# Patient Record
Sex: FEMALE | Race: WHITE | NOT HISPANIC OR LATINO | Employment: FULL TIME | URBAN - METROPOLITAN AREA
[De-identification: names, ages, dates, MRNs, and addresses within clinical notes are randomized per-mention and may not be internally consistent; named-entity substitution may affect disease eponyms.]

---

## 2017-01-09 RX ORDER — NORGESTIMATE AND ETHINYL ESTRADIOL 7DAYSX3 28
1 KIT ORAL DAILY
Qty: 28 TABLET | Refills: 6 | Status: SHIPPED | OUTPATIENT
Start: 2017-01-09 | End: 2017-01-11 | Stop reason: SDUPTHER

## 2017-01-11 RX ORDER — NORGESTIMATE AND ETHINYL ESTRADIOL 7DAYSX3 28
1 KIT ORAL DAILY
Qty: 28 TABLET | Refills: 6 | Status: SHIPPED | OUTPATIENT
Start: 2017-01-11 | End: 2017-01-13 | Stop reason: SDUPTHER

## 2017-01-13 RX ORDER — NORGESTIMATE AND ETHINYL ESTRADIOL 7DAYSX3 28
1 KIT ORAL DAILY
Qty: 28 TABLET | Refills: 6 | Status: SHIPPED | OUTPATIENT
Start: 2017-01-13 | End: 2017-04-25 | Stop reason: SDUPTHER

## 2017-01-23 DIAGNOSIS — IMO0002 DIABETES MELLITUS TYPE 2, UNCONTROLLED: ICD-10-CM

## 2017-01-23 RX ORDER — INSULIN DETEMIR 100 [IU]/ML
INJECTION, SOLUTION SUBCUTANEOUS
Refills: 0 | Status: CANCELLED | OUTPATIENT
Start: 2017-01-23

## 2017-01-23 NOTE — TELEPHONE ENCOUNTER
----- Message from Delma Corona sent at 1/23/2017 11:04 AM EST -----  Contact: patient  insulin detemir (LEVEMIR) 100 UNIT/ML injection         Inject 50 Units under the skin every night.    3 BOXES (15 PENS ) 90 DAY SUPPLY       [place] [phone]      SCRIPT SENT

## 2017-02-13 RX ORDER — LEVOTHYROXINE SODIUM 0.15 MG/1
TABLET ORAL
Qty: 30 TABLET | Refills: 1 | Status: SHIPPED | OUTPATIENT
Start: 2017-02-13 | End: 2017-04-25 | Stop reason: SDUPTHER

## 2017-03-27 RX ORDER — GEMFIBROZIL 600 MG/1
600 TABLET, FILM COATED ORAL 2 TIMES DAILY
Qty: 60 TABLET | Refills: 0 | Status: SHIPPED | OUTPATIENT
Start: 2017-03-27 | End: 2017-03-30 | Stop reason: SDUPTHER

## 2017-03-27 RX ORDER — DAPAGLIFLOZIN AND METFORMIN HYDROCHLORIDE 5; 1000 MG/1; MG/1
TABLET, FILM COATED, EXTENDED RELEASE ORAL
Qty: 60 TABLET | Refills: 0 | Status: SHIPPED | OUTPATIENT
Start: 2017-03-27 | End: 2017-06-13 | Stop reason: SDUPTHER

## 2017-03-30 RX ORDER — GEMFIBROZIL 600 MG/1
600 TABLET, FILM COATED ORAL 2 TIMES DAILY
Qty: 60 TABLET | Refills: 0 | Status: SHIPPED | OUTPATIENT
Start: 2017-03-30 | End: 2017-04-25 | Stop reason: SDUPTHER

## 2017-04-06 ENCOUNTER — TELEPHONE (OUTPATIENT)
Dept: FAMILY MEDICINE CLINIC | Facility: CLINIC | Age: 37
End: 2017-04-06

## 2017-04-25 ENCOUNTER — OFFICE VISIT (OUTPATIENT)
Dept: FAMILY MEDICINE CLINIC | Facility: CLINIC | Age: 37
End: 2017-04-25

## 2017-04-25 VITALS
HEART RATE: 116 BPM | OXYGEN SATURATION: 98 % | DIASTOLIC BLOOD PRESSURE: 80 MMHG | HEIGHT: 63 IN | SYSTOLIC BLOOD PRESSURE: 120 MMHG | WEIGHT: 199.9 LBS | BODY MASS INDEX: 35.42 KG/M2

## 2017-04-25 DIAGNOSIS — E78.1 HYPERTRIGLYCERIDEMIA: ICD-10-CM

## 2017-04-25 DIAGNOSIS — E11.9 DIABETES MELLITUS TYPE 2, INSULIN DEPENDENT (HCC): ICD-10-CM

## 2017-04-25 DIAGNOSIS — E03.9 ACQUIRED HYPOTHYROIDISM: ICD-10-CM

## 2017-04-25 DIAGNOSIS — Z00.00 ANNUAL PHYSICAL EXAM: Primary | ICD-10-CM

## 2017-04-25 DIAGNOSIS — K52.9 GASTROENTERITIS: ICD-10-CM

## 2017-04-25 DIAGNOSIS — Z72.0 TOBACCO ABUSE: ICD-10-CM

## 2017-04-25 DIAGNOSIS — E16.1 HYPERINSULINEMIA: ICD-10-CM

## 2017-04-25 DIAGNOSIS — Z79.4 DIABETES MELLITUS TYPE 2, INSULIN DEPENDENT (HCC): ICD-10-CM

## 2017-04-25 PROCEDURE — 99213 OFFICE O/P EST LOW 20 MIN: CPT | Performed by: NURSE PRACTITIONER

## 2017-04-25 PROCEDURE — 99395 PREV VISIT EST AGE 18-39: CPT | Performed by: NURSE PRACTITIONER

## 2017-04-25 RX ORDER — LEVOTHYROXINE SODIUM 0.15 MG/1
150 TABLET ORAL DAILY
Qty: 30 TABLET | Refills: 6 | Status: SHIPPED | OUTPATIENT
Start: 2017-04-25 | End: 2017-05-11 | Stop reason: SDUPTHER

## 2017-04-25 RX ORDER — NORGESTIMATE AND ETHINYL ESTRADIOL 7DAYSX3 28
1 KIT ORAL DAILY
Qty: 28 TABLET | Refills: 6 | Status: SHIPPED | OUTPATIENT
Start: 2017-04-25 | End: 2018-03-15 | Stop reason: CLARIF

## 2017-04-25 RX ORDER — GEMFIBROZIL 600 MG/1
600 TABLET, FILM COATED ORAL 2 TIMES DAILY
Qty: 60 TABLET | Refills: 5 | Status: SHIPPED | OUTPATIENT
Start: 2017-04-25 | End: 2018-11-02

## 2017-04-25 RX ORDER — ONDANSETRON 8 MG/1
8 TABLET, ORALLY DISINTEGRATING ORAL EVERY 8 HOURS PRN
Qty: 21 TABLET | Refills: 0 | Status: SHIPPED | OUTPATIENT
Start: 2017-04-25

## 2017-04-25 NOTE — PROGRESS NOTES
"Subjective   Cora Arshad is a 37 y.o. female.   Here for her annual physical and medication refill  Is a Type 2 diabetic and is followed by endocrine and her meds are refilled by them. This is a chronic problem.  Has hyperlipidemia and takes Gemfibrozil for her triglycerides which is a chronic problem.  Hypothyroidism is a chronic problem and she takes synthroid.    Chief Complaint   Patient presents with   • Med Refill   • Diarrhea     started this am around 4 am   • Vomiting     started this am around 4 am     Social History   Substance Use Topics   • Smoking status: Current Every Day Smoker     Packs/day: 0.50     Types: Cigarettes   • Smokeless tobacco: Never Used   • Alcohol use No       History of Present Illness         Review of Systems   Gastrointestinal: Positive for diarrhea, nausea and vomiting.   All other systems reviewed and are negative.      Objective   Vitals:    04/25/17 0939   BP: 120/80   Pulse: 116   SpO2: 98%   Weight: 199 lb 14.4 oz (90.7 kg)   Height: 63\" (160 cm)     Body mass index is 35.41 kg/(m^2).    Physical Exam   Constitutional: She appears well-developed and well-nourished. No distress.   HENT:   Head: Normocephalic and atraumatic.   Right Ear: External ear normal.   Left Ear: External ear normal.   Eyes: EOM are normal.   Neck: Neck supple. No thyromegaly present.   Cardiovascular: Normal rate, regular rhythm and normal heart sounds.    Pulmonary/Chest: Effort normal and breath sounds normal.   Musculoskeletal: Normal range of motion.   Neurological: She is alert.   Skin: Skin is warm.   Nursing note and vitals reviewed.      Assessment/Plan   Problem List Items Addressed This Visit     None      Visit Diagnoses     Annual physical exam    -  Primary    Hyperinsulinemia        Diabetes mellitus type 2, insulin dependent        Tobacco abuse        Hypertriglyceridemia        Relevant Medications    gemfibrozil (LOPID) 600 MG tablet    Acquired hypothyroidism        Relevant " Medications    levothyroxine (SYNTHROID, LEVOTHROID) 150 MCG tablet    Gastroenteritis        Relevant Medications    ondansetron ODT (ZOFRAN ODT) 8 MG disintegrating tablet         Did not draw any labs at today's visit because her endocrinologist is ordering them next week but we reviewed the ones that were drawn 6 months ago.

## 2017-04-28 DIAGNOSIS — IMO0002 UNCONTROLLED TYPE 2 DIABETES MELLITUS WITH COMPLICATION, WITH LONG-TERM CURRENT USE OF INSULIN: Primary | ICD-10-CM

## 2017-04-28 DIAGNOSIS — E03.9 ADULT HYPOTHYROIDISM: ICD-10-CM

## 2017-04-28 RX ORDER — LIRAGLUTIDE 6 MG/ML
INJECTION SUBCUTANEOUS
Qty: 9 PEN | Refills: 3 | Status: SHIPPED | OUTPATIENT
Start: 2017-04-28 | End: 2017-09-17 | Stop reason: SDUPTHER

## 2017-05-01 ENCOUNTER — RESULTS ENCOUNTER (OUTPATIENT)
Dept: ENDOCRINOLOGY | Age: 37
End: 2017-05-01

## 2017-05-01 DIAGNOSIS — E03.9 ADULT HYPOTHYROIDISM: ICD-10-CM

## 2017-05-01 DIAGNOSIS — IMO0002 UNCONTROLLED TYPE 2 DIABETES MELLITUS WITH COMPLICATION, WITH LONG-TERM CURRENT USE OF INSULIN: ICD-10-CM

## 2017-05-05 ENCOUNTER — OFFICE VISIT (OUTPATIENT)
Dept: ENDOCRINOLOGY | Age: 37
End: 2017-05-05

## 2017-05-05 VITALS
DIASTOLIC BLOOD PRESSURE: 78 MMHG | HEIGHT: 63 IN | HEART RATE: 98 BPM | WEIGHT: 202 LBS | BODY MASS INDEX: 35.79 KG/M2 | OXYGEN SATURATION: 97 % | SYSTOLIC BLOOD PRESSURE: 110 MMHG

## 2017-05-05 DIAGNOSIS — E03.9 ADULT HYPOTHYROIDISM: ICD-10-CM

## 2017-05-05 DIAGNOSIS — E78.49 OTHER HYPERLIPIDEMIA: ICD-10-CM

## 2017-05-05 DIAGNOSIS — Z79.4 ENCOUNTER FOR LONG-TERM (CURRENT) USE OF INSULIN (HCC): ICD-10-CM

## 2017-05-05 DIAGNOSIS — R63.5 ABNORMAL WEIGHT GAIN: ICD-10-CM

## 2017-05-05 DIAGNOSIS — IMO0002 UNCONTROLLED TYPE 2 DIABETES MELLITUS WITH COMPLICATION, WITH LONG-TERM CURRENT USE OF INSULIN: Primary | ICD-10-CM

## 2017-05-05 DIAGNOSIS — E16.1 HYPERINSULINISM: ICD-10-CM

## 2017-05-05 PROCEDURE — 99214 OFFICE O/P EST MOD 30 MIN: CPT | Performed by: INTERNAL MEDICINE

## 2017-05-06 LAB
ALBUMIN SERPL-MCNC: 4.5 G/DL (ref 3.5–5.2)
ALBUMIN/CREAT UR: 47.4 MG/G CREAT (ref 0–30)
ALBUMIN/GLOB SERPL: 1.4 G/DL
ALP SERPL-CCNC: 52 U/L (ref 39–117)
ALT SERPL-CCNC: 20 U/L (ref 1–33)
AST SERPL-CCNC: 14 U/L (ref 1–32)
BILIRUB SERPL-MCNC: 0.3 MG/DL (ref 0.1–1.2)
BUN SERPL-MCNC: 12 MG/DL (ref 6–20)
BUN/CREAT SERPL: 15.6 (ref 7–25)
CALCIUM SERPL-MCNC: 9.6 MG/DL (ref 8.6–10.5)
CHLORIDE SERPL-SCNC: 99 MMOL/L (ref 98–107)
CO2 SERPL-SCNC: 20.6 MMOL/L (ref 22–29)
CREAT SERPL-MCNC: 0.77 MG/DL (ref 0.57–1)
CREAT UR-MCNC: 167.9 MG/DL
GLOBULIN SER CALC-MCNC: 3.2 GM/DL
GLUCOSE SERPL-MCNC: 109 MG/DL (ref 65–99)
HBA1C MFR BLD: 6.81 % (ref 4.8–5.6)
MICROALBUMIN UR-MCNC: 79.6 UG/ML
POTASSIUM SERPL-SCNC: 4.4 MMOL/L (ref 3.5–5.2)
PROT SERPL-MCNC: 7.7 G/DL (ref 6–8.5)
SODIUM SERPL-SCNC: 138 MMOL/L (ref 136–145)
T4 FREE SERPL-MCNC: 1.11 NG/DL (ref 0.93–1.7)
TSH SERPL DL<=0.005 MIU/L-ACNC: 10.4 MIU/ML (ref 0.27–4.2)

## 2017-05-11 DIAGNOSIS — E03.9 ACQUIRED HYPOTHYROIDISM: ICD-10-CM

## 2017-05-11 RX ORDER — LEVOTHYROXINE SODIUM 175 UG/1
175 TABLET ORAL DAILY
Qty: 30 TABLET | Refills: 5 | Status: SHIPPED | OUTPATIENT
Start: 2017-05-11 | End: 2017-08-10

## 2017-06-13 RX ORDER — DAPAGLIFLOZIN AND METFORMIN HYDROCHLORIDE 5; 1000 MG/1; MG/1
TABLET, FILM COATED, EXTENDED RELEASE ORAL
Qty: 60 TABLET | Refills: 0 | Status: SHIPPED | OUTPATIENT
Start: 2017-06-13 | End: 2017-09-01 | Stop reason: SDUPTHER

## 2017-07-26 RX ORDER — INSULIN DETEMIR 100 [IU]/ML
INJECTION, SOLUTION SUBCUTANEOUS
Qty: 15 PEN | Refills: 1 | Status: SHIPPED | OUTPATIENT
Start: 2017-07-26 | End: 2017-09-08 | Stop reason: SDUPTHER

## 2017-08-10 ENCOUNTER — OFFICE VISIT (OUTPATIENT)
Dept: ENDOCRINOLOGY | Age: 37
End: 2017-08-10

## 2017-08-10 VITALS
OXYGEN SATURATION: 97 % | WEIGHT: 202.4 LBS | HEIGHT: 63 IN | HEART RATE: 105 BPM | DIASTOLIC BLOOD PRESSURE: 76 MMHG | BODY MASS INDEX: 35.86 KG/M2 | SYSTOLIC BLOOD PRESSURE: 120 MMHG

## 2017-08-10 DIAGNOSIS — E16.1 HYPERINSULINISM: ICD-10-CM

## 2017-08-10 DIAGNOSIS — E03.9 ACQUIRED HYPOTHYROIDISM: ICD-10-CM

## 2017-08-10 DIAGNOSIS — R63.5 ABNORMAL WEIGHT GAIN: ICD-10-CM

## 2017-08-10 DIAGNOSIS — IMO0002 UNCONTROLLED TYPE 2 DIABETES MELLITUS WITH COMPLICATION, WITH LONG-TERM CURRENT USE OF INSULIN: Primary | ICD-10-CM

## 2017-08-10 DIAGNOSIS — Z79.4 ENCOUNTER FOR LONG-TERM (CURRENT) USE OF INSULIN (HCC): ICD-10-CM

## 2017-08-10 PROCEDURE — 99214 OFFICE O/P EST MOD 30 MIN: CPT | Performed by: INTERNAL MEDICINE

## 2017-08-10 RX ORDER — LEVOTHYROXINE SODIUM 0.15 MG/1
TABLET ORAL
COMMUNITY
Start: 2017-07-17 | End: 2018-03-15 | Stop reason: SDUPTHER

## 2017-08-10 NOTE — PROGRESS NOTES
37 y.o.    Patient Care Team:  Veronica Shipman MD (Inactive) as PCP - General (Family Medicine)  Travis DURBIN MD as Consulting Physician (Endocrinology)  Liz Davidson MD as Consulting Physician (Hematology and Oncology)    Chief Complaint:      F/U TYPE 2 DIABETES, UNCONTROLLED.  HYPOTHYROIDISM.  NO RECENT LABS.  Subjective     HPI     Patient is a 57-year-old white female with a past medical history of uncontrolled type 2 diabetes mellitus and hypothyroidism came for followup.  Patient was diagnosed with diabetes in April 20/15 and has been on medication,  She's currently taking Victoza 1.8 mg daily, Levemir 40 units at night, XIGDUO one tablet daily.  Patient's blood sugars are ranging from 100. To 180.  She's taking Accu-Cheks very infrequently   Patient has not taken any NovoLog. In the recent. Several months.    Hypothyroidism.  Patient is currently taking levothyroxine 150 mcg daily.  She reports compliance with the medication.  She denies any side effects.  Patient reported that she is under a lot of stress preparing for wedding in September 2017 and is not able to focus on her diabetes management.  She realized that her blood sugars are slightly higher than before         Interval HistoryDecember 19, 2016         Summary  New-onset diabetes  Patient was apparently admitted to the hospital in April 2015 with diabetic ketoacidosis, pneumonia, pancreatitis and was diagnosed with severe hyperlipidemia, diabetes and was started on Levemir 20 units at bedtime and NovoLog 5 units before each meal and discharge home.. Patient only reports symptoms for a brief period prior to admission including nausea vomiting abdominal pain excessive thirst etc.  Since discharge patient is noted that her blood sugars are still elevated she increase the insulin NovoLog to 10 units before each meal and believes that currently her blood sugars are mostly less than 200  She had one episode of hypoglycemia at 89 in  the early afternoon when she was feeling slightly jittery and nervous  Hypothyroidism  Patient was diagnosed with hypothyroidism at the age of 12 and has been on medication ever since.  Hyperlipidemia  New diagnosis with a triglyceride level of greater than 1000 prior to admission to the hospital. She does recall being told that her triglycerides and cholesterol are always higher for the past 20 years but was never on treatment.  Abnormal weight gain  Patient reports that she andhas gained weight significantly over the past 5-10 years. Currently she weighs 196 pounds with a BMI of 34.7.  Patient appears to be comfortable with Accu-Cheks and insulin regimen but is seeking an alternative regimen if possible. She is unaware of her diabetes status whether she is type I or type II.  She has a strong family history of type 2 diabetes.  In the previous visit patient was advised to try Victoza. She apparently had good response but her insurance would not cover Victoza due to history pancreatitis  So she had to stop this medication and started gaining weight again.       Patient was not able to get SAXENDA approved due to previous history of pancreatitis.  She reported that she lost a few pounds since last visit  She also believes that the new medication ZIGDUO is helping somewhat and losing weight as well.  She continues Levemir 40 units at night  NovoLog 15-17 units usually at dinner  Continues levothyroxine 100 mcg since November 2015.      Patient has leukocytosis and is currently being evaluated by neurology as well as ENT  She has a history pancreatitis April 21, 2015 and has used Victoza 4-5months after that  She wants to restart that medication   and  The following portions of the patient's history were reviewed and updated as appropriate: allergies, current medications, past family history, past medical history, past social history, past surgical history and problem list.    Past Medical History:   Diagnosis Date    • Diabetes mellitus    • Fatty liver    • H/O Pancreatitis, acute    • Hyperlipidemia    • Hypothyroidism    • Left ovarian cyst      Family History   Problem Relation Age of Onset   • Diabetes Mother    • Hyperlipidemia Mother    • Hypertension Mother      Social History     Social History   • Marital status: Single     Spouse name: N/A   • Number of children: N/A   • Years of education: N/A     Occupational History   • Office of       Social History Main Topics   • Smoking status: Current Every Day Smoker     Packs/day: 0.50     Types: Cigarettes   • Smokeless tobacco: Never Used   • Alcohol use No   • Drug use: No   • Sexual activity: Defer     Other Topics Concern   • Not on file     Social History Narrative     Allergies   Allergen Reactions   • No Known Drug Allergy        Current Outpatient Prescriptions:   •  gemfibrozil (LOPID) 600 MG tablet, Take 1 tablet by mouth 2 (Two) Times a Day., Disp: 60 tablet, Rfl: 5  •  glucose blood test strip, 1 each by Other route 4 (four) times a day. Use as instructed, Disp: 400 each, Rfl: 1  •  insulin aspart (NOVOLOG FLEXPEN) 100 UNIT/ML solution pen-injector sc pen, Inject  under the skin., Disp: , Rfl:   •  Insulin Pen Needle 32G X 4 MM misc, 5 TIMES DAILY SC USE, Disp: 200 each, Rfl: 4  •  LEVEMIR FLEXTOUCH 100 UNIT/ML injection, INJECT 50 UNITS UNDER SKIN EVERY NIGHT, Disp: 15 pen, Rfl: 1  •  levothyroxine (SYNTHROID, LEVOTHROID) 175 MCG tablet, Take 1 tablet by mouth Daily., Disp: 30 tablet, Rfl: 5  •  norgestimate-ethinyl estradiol (ORTHO TRI-CYCLEN,TRINESSA) 0.18/0.215/0.25 MG-35 MCG per tablet, Take 1 tablet by mouth Daily., Disp: 28 tablet, Rfl: 6  •  ondansetron ODT (ZOFRAN ODT) 8 MG disintegrating tablet, Take 1 tablet by mouth Every 8 (Eight) Hours As Needed for Nausea or Vomiting., Disp: 21 tablet, Rfl: 0  •  ranitidine (ZANTAC) 150 MG tablet, , Disp: , Rfl:   •  VICTOZA 18 MG/3ML solution pen-injector, INJECT 1.8MG SUBCUTANEOUSLY DAILY,  "Disp: 9 pen, Rfl: 3  •  XIGDUO XR 5-1000 MG tablet sustained-release 24 hour, TAKE ONE TABLET BY MOUTH TWICE A DAY, Disp: 60 tablet, Rfl: 0        Review of Systems   Constitutional: Negative for chills, fatigue and fever.   Cardiovascular: Negative for chest pain and palpitations.   Gastrointestinal: Negative for abdominal pain, constipation, diarrhea, nausea and vomiting.   Endocrine: Negative for cold intolerance and heat intolerance.   All other systems reviewed and are negative.      Objective       Vitals:    08/10/17 1036   BP: 120/76   Pulse: 105   SpO2: 97%   Weight: 202 lb 6.4 oz (91.8 kg)   Height: 63\" (160 cm)     Body mass index is 35.85 kg/(m^2).      Physical Exam   Constitutional: She is oriented to person, place, and time. She appears well-developed and well-nourished.   HENT:   Head: Normocephalic and atraumatic.   Eyes: EOM are normal. Pupils are equal, round, and reactive to light.   Neck: Normal range of motion. Neck supple. No thyromegaly present.   Cardiovascular: Normal rate, regular rhythm, normal heart sounds and intact distal pulses.    Pulmonary/Chest: Effort normal and breath sounds normal.   Abdominal: Soft. Bowel sounds are normal. She exhibits distension. There is no tenderness.   Musculoskeletal: She exhibits no edema.   Neurological: She is alert and oriented to person, place, and time.   Skin: Skin is warm and dry.   Psychiatric: She has a normal mood and affect. Her behavior is normal.   Nursing note and vitals reviewed.    Results Review:     I reviewed the patient's new clinical results.    Medical records reviewed  Summary:      Office Visit on 05/05/2017   Component Date Value Ref Range Status   • Glucose 05/05/2017 109* 65 - 99 mg/dL Final   • BUN 05/05/2017 12  6 - 20 mg/dL Final   • Creatinine 05/05/2017 0.77  0.57 - 1.00 mg/dL Final   • eGFR Non African Am 05/05/2017 85  >60 mL/min/1.73 Final   • eGFR African Am 05/05/2017 103  >60 mL/min/1.73 Final   • BUN/Creatinine " Ratio 05/05/2017 15.6  7.0 - 25.0 Final   • Sodium 05/05/2017 138  136 - 145 mmol/L Final   • Potassium 05/05/2017 4.4  3.5 - 5.2 mmol/L Final   • Chloride 05/05/2017 99  98 - 107 mmol/L Final   • Total CO2 05/05/2017 20.6* 22.0 - 29.0 mmol/L Final   • Calcium 05/05/2017 9.6  8.6 - 10.5 mg/dL Final   • Total Protein 05/05/2017 7.7  6.0 - 8.5 g/dL Final   • Albumin 05/05/2017 4.50  3.50 - 5.20 g/dL Final   • Globulin 05/05/2017 3.2  gm/dL Final   • A/G Ratio 05/05/2017 1.4  g/dL Final   • Total Bilirubin 05/05/2017 0.3  0.1 - 1.2 mg/dL Final   • Alkaline Phosphatase 05/05/2017 52  39 - 117 U/L Final   • AST (SGOT) 05/05/2017 14  1 - 32 U/L Final   • ALT (SGPT) 05/05/2017 20  1 - 33 U/L Final   • Hemoglobin A1C 05/05/2017 6.81* 4.80 - 5.60 % Final    Comment: Hemoglobin A1C Ranges:  Increased Risk for Diabetes  5.7% to 6.4%  Diabetes                     >= 6.5%  Diabetic Goal                < 7.0%     • Free T4 05/05/2017 1.11  0.93 - 1.70 ng/dL Final   • TSH 05/05/2017 10.400* 0.270 - 4.200 mIU/mL Final   • Creatinine, Urine 05/05/2017 167.9  Not Estab. mg/dL Final   • Microalbumin, Urine 05/05/2017 79.6  Not Estab. ug/mL Final   • Microalbumin/Creatinine Ratio Urine 05/05/2017 47.4* 0.0 - 30.0 mg/g creat Final     Lab Results   Component Value Date    HGBA1C 6.81 (H) 05/05/2017    HGBA1C 6.88 (H) 12/09/2016    HGBA1C 6.80 (H) 08/12/2016     Lab Results   Component Value Date    MICROALBUR 79.6 05/05/2017    CREATININE 0.77 05/05/2017     Imaging Results (most recent)     None                Assessment and Plan:    Cora was seen today for diabetes and hypothyroidism.    Diagnoses and all orders for this visit:    Uncontrolled type 2 diabetes mellitus with complication, with long-term current use of insulin  -     Comprehensive Metabolic Panel  -     Hemoglobin A1c  -     T4, Free  -     TSH  -     Microalbumin / Creatinine Urine Ratio    Acquired hypothyroidism  -     Comprehensive Metabolic Panel  -     Hemoglobin  "A1c  -     T4, Free  -     TSH  -     Microalbumin / Creatinine Urine Ratio    Encounter for long-term (current) use of insulin    Abnormal weight gain    Hyperinsulinism      1 uncontrolled type 2 diabetes mellitus- last known hemoglobin A1c is 6.9% in Nov 2015  2 hypothyroidism  3 hyperlipidemia I advised the patient to start Crestor 20 mg daily  4 abnormal weight gain  5 insulin management      Glucometer download reviewed  Patient has only 2 values in the past 2 weeks 112 and 162  Patient has not been checking frequently  's TSH in the past was greater than 10% reports that she was noncompliant for nearly one month which could explain that  him  Patient is currently taking victoza 1.8 mg daily at night  She is also taking Levemir 40 units at night  She is taking XIGDUO only 1 tablet daily    I offered FreeStyle CGM for 2 weeks but patient declined  She is getting  in the next 3 weeks and cannot focus on anything else    Patient will get lab testing done today  She'll return to follow-up in 3-4 months     The total time spent for old record and lab review and face- to- face was more than 25 min of which greater than 15 min of time was spent on counseling the patient on recommended evaluation and treatment options, instructions for management/treatment and /or follow up  and importance of compliance with chosen management or treatment options   Travis Oropeza MD. FACE    08/10/17      EM[facility] Dragon / transcription disclaimer:     \"Dictated utilizing Dragon dictation\".       Him him andhim him him  "

## 2017-08-11 LAB
ALBUMIN SERPL-MCNC: 4.2 G/DL (ref 3.5–5.2)
ALBUMIN/CREAT UR: 56.1 MG/G CREAT (ref 0–30)
ALBUMIN/GLOB SERPL: 1.1 G/DL
ALP SERPL-CCNC: 55 U/L (ref 39–117)
ALT SERPL-CCNC: 19 U/L (ref 1–33)
AST SERPL-CCNC: 18 U/L (ref 1–32)
BILIRUB SERPL-MCNC: 0.3 MG/DL (ref 0.1–1.2)
BUN SERPL-MCNC: 12 MG/DL (ref 6–20)
BUN/CREAT SERPL: 17.4 (ref 7–25)
CALCIUM SERPL-MCNC: 9.7 MG/DL (ref 8.6–10.5)
CHLORIDE SERPL-SCNC: 99 MMOL/L (ref 98–107)
CO2 SERPL-SCNC: 21.5 MMOL/L (ref 22–29)
CREAT SERPL-MCNC: 0.69 MG/DL (ref 0.57–1)
CREAT UR-MCNC: 125.2 MG/DL
GLOBULIN SER CALC-MCNC: 3.8 GM/DL
GLUCOSE SERPL-MCNC: 84 MG/DL (ref 65–99)
HBA1C MFR BLD: 7.3 % (ref 4.8–5.6)
MICROALBUMIN UR-MCNC: 70.2 UG/ML
POTASSIUM SERPL-SCNC: 4.5 MMOL/L (ref 3.5–5.2)
PROT SERPL-MCNC: 8 G/DL (ref 6–8.5)
SODIUM SERPL-SCNC: 137 MMOL/L (ref 136–145)
T4 FREE SERPL-MCNC: 1.39 NG/DL (ref 0.93–1.7)
TSH SERPL DL<=0.005 MIU/L-ACNC: 2.87 MIU/ML (ref 0.27–4.2)

## 2017-09-01 RX ORDER — DAPAGLIFLOZIN AND METFORMIN HYDROCHLORIDE 5; 1000 MG/1; MG/1
TABLET, FILM COATED, EXTENDED RELEASE ORAL
Qty: 60 TABLET | Refills: 0 | Status: SHIPPED | OUTPATIENT
Start: 2017-09-01 | End: 2017-10-02 | Stop reason: SDUPTHER

## 2017-09-18 RX ORDER — LIRAGLUTIDE 6 MG/ML
INJECTION SUBCUTANEOUS
Qty: 9 PEN | Refills: 2 | Status: SHIPPED | OUTPATIENT
Start: 2017-09-18 | End: 2017-12-24 | Stop reason: SDUPTHER

## 2017-10-03 RX ORDER — DAPAGLIFLOZIN AND METFORMIN HYDROCHLORIDE 5; 1000 MG/1; MG/1
TABLET, FILM COATED, EXTENDED RELEASE ORAL
Qty: 60 TABLET | Refills: 0 | Status: SHIPPED | OUTPATIENT
Start: 2017-10-03 | End: 2018-01-17 | Stop reason: SDUPTHER

## 2017-12-24 DIAGNOSIS — E03.9 ACQUIRED HYPOTHYROIDISM: ICD-10-CM

## 2017-12-26 RX ORDER — LEVOTHYROXINE SODIUM 0.15 MG/1
TABLET ORAL
Qty: 30 TABLET | Refills: 3 | Status: SHIPPED | OUTPATIENT
Start: 2017-12-26 | End: 2018-05-10 | Stop reason: SDUPTHER

## 2017-12-26 RX ORDER — LIRAGLUTIDE 6 MG/ML
INJECTION SUBCUTANEOUS
Qty: 9 PEN | Refills: 1 | Status: SHIPPED | OUTPATIENT
Start: 2017-12-26 | End: 2018-03-01 | Stop reason: SDUPTHER

## 2018-01-09 ENCOUNTER — OFFICE VISIT (OUTPATIENT)
Dept: FAMILY MEDICINE CLINIC | Facility: CLINIC | Age: 38
End: 2018-01-09

## 2018-01-09 VITALS
TEMPERATURE: 97.9 F | DIASTOLIC BLOOD PRESSURE: 64 MMHG | SYSTOLIC BLOOD PRESSURE: 102 MMHG | BODY MASS INDEX: 36.68 KG/M2 | WEIGHT: 207 LBS | HEART RATE: 101 BPM | OXYGEN SATURATION: 98 % | HEIGHT: 63 IN

## 2018-01-09 DIAGNOSIS — J40 BRONCHITIS: Primary | ICD-10-CM

## 2018-01-09 DIAGNOSIS — J01.00 ACUTE NON-RECURRENT MAXILLARY SINUSITIS: ICD-10-CM

## 2018-01-09 PROCEDURE — 99213 OFFICE O/P EST LOW 20 MIN: CPT | Performed by: NURSE PRACTITIONER

## 2018-01-09 RX ORDER — AMOXICILLIN 500 MG/1
1000 CAPSULE ORAL 2 TIMES DAILY
Qty: 40 CAPSULE | Refills: 0 | Status: SHIPPED | OUTPATIENT
Start: 2018-01-09 | End: 2018-03-15

## 2018-01-09 NOTE — PROGRESS NOTES
"Cora Gutiérrez is a 37 y.o. female.Patient states that for 7 days she has had sinus congestion and a productive cough. She states that [facility] and was negative for the flu. She was prescribed an inhaler. She has been using Mucinex.     Assessment/Plan   Problem List Items Addressed This Visit     None      Visit Diagnoses     Bronchitis    -  Primary    Relevant Medications    amoxicillin (AMOXIL) 500 MG capsule    Acute non-recurrent maxillary sinusitis        Relevant Medications    amoxicillin (AMOXIL) 500 MG capsule             No Follow-up on file.  There are no Patient Instructions on file for this visit.    Chief Complaint   Patient presents with   • Cough     Social History   Substance Use Topics   • Smoking status: Current Every Day Smoker     Packs/day: 0.50     Types: Cigarettes   • Smokeless tobacco: Never Used   • Alcohol use No       History of Present Illness     The following portions of the patient's history were reviewed and updated as appropriate:PMHroutine: Social history , Allergies, Current Medications and Active Problem List    Review of Systems   HENT: Positive for sinus pain.    Respiratory: Positive for cough and shortness of breath.    Musculoskeletal: Negative for myalgias.       Objective   Vitals:    01/09/18 1014   BP: 102/64   Pulse: 101   Temp: 97.9 °F (36.6 °C)   SpO2: 98%   Weight: 93.9 kg (207 lb)   Height: 160 cm (63\")     Body mass index is 36.67 kg/(m^2).  Physical Exam   Constitutional: She appears well-developed and well-nourished. No distress.   HENT:   Head: Normocephalic.   Right Ear: External ear normal.   Left Ear: External ear normal.   Op red with drainage  + maxillary sinus pressure and pain   Eyes: EOM are normal.   Cardiovascular: Normal rate and regular rhythm.    Pulmonary/Chest: Effort normal.   Musculoskeletal: Normal range of motion.   Neurological: She is alert.   Nursing note and vitals reviewed.    Reviewed Data:  No visits " with results within 1 Month(s) from this visit.  Latest known visit with results is:    Office Visit on 08/10/2017   Component Date Value Ref Range Status   • Glucose 08/10/2017 84  65 - 99 mg/dL Final   • BUN 08/10/2017 12  6 - 20 mg/dL Final   • Creatinine 08/10/2017 0.69  0.57 - 1.00 mg/dL Final   • eGFR Non  Am 08/10/2017 96  >60 mL/min/1.73 Final   • eGFR African Am 08/10/2017 117  >60 mL/min/1.73 Final   • BUN/Creatinine Ratio 08/10/2017 17.4  7.0 - 25.0 Final   • Sodium 08/10/2017 137  136 - 145 mmol/L Final   • Potassium 08/10/2017 4.5  3.5 - 5.2 mmol/L Final   • Chloride 08/10/2017 99  98 - 107 mmol/L Final   • Total CO2 08/10/2017 21.5* 22.0 - 29.0 mmol/L Final   • Calcium 08/10/2017 9.7  8.6 - 10.5 mg/dL Final   • Total Protein 08/10/2017 8.0  6.0 - 8.5 g/dL Final   • Albumin 08/10/2017 4.20  3.50 - 5.20 g/dL Final   • Globulin 08/10/2017 3.8  gm/dL Final   • A/G Ratio 08/10/2017 1.1  g/dL Final   • Total Bilirubin 08/10/2017 0.3  0.1 - 1.2 mg/dL Final   • Alkaline Phosphatase 08/10/2017 55  39 - 117 U/L Final   • AST (SGOT) 08/10/2017 18  1 - 32 U/L Final   • ALT (SGPT) 08/10/2017 19  1 - 33 U/L Final   • Hemoglobin A1C 08/10/2017 7.30* 4.80 - 5.60 % Final    Comment: Hemoglobin A1C Ranges:  Increased Risk for Diabetes  5.7% to 6.4%  Diabetes                     >= 6.5%  Diabetic Goal                < 7.0%     • Free T4 08/10/2017 1.39  0.93 - 1.70 ng/dL Final   • TSH 08/10/2017 2.870  0.270 - 4.200 mIU/mL Final   • Creatinine, Urine 08/10/2017 125.2  Not Estab. mg/dL Final   • Microalbumin, Urine 08/10/2017 70.2  Not Estab. ug/mL Final   • Microalbumin/Creatinine Ratio Urine 08/10/2017 56.1* 0.0 - 30.0 mg/g creat Final

## 2018-01-15 RX ORDER — NORGESTIMATE AND ETHINYL ESTRADIOL 7DAYSX3 28
KIT ORAL
Qty: 28 TABLET | Refills: 5 | Status: SHIPPED | OUTPATIENT
Start: 2018-01-15 | End: 2018-07-25 | Stop reason: SDUPTHER

## 2018-01-17 RX ORDER — DAPAGLIFLOZIN AND METFORMIN HYDROCHLORIDE 5; 1000 MG/1; MG/1
TABLET, FILM COATED, EXTENDED RELEASE ORAL
Qty: 60 TABLET | Refills: 0 | Status: SHIPPED | OUTPATIENT
Start: 2018-01-17 | End: 2018-06-29 | Stop reason: SDUPTHER

## 2018-01-18 ENCOUNTER — RESULTS ENCOUNTER (OUTPATIENT)
Dept: ENDOCRINOLOGY | Age: 38
End: 2018-01-18

## 2018-01-18 DIAGNOSIS — IMO0002 UNCONTROLLED TYPE 2 DIABETES MELLITUS WITH COMPLICATION, WITH LONG-TERM CURRENT USE OF INSULIN: Primary | ICD-10-CM

## 2018-01-18 DIAGNOSIS — E03.9 ADULT HYPOTHYROIDISM: ICD-10-CM

## 2018-01-18 DIAGNOSIS — IMO0002 UNCONTROLLED TYPE 2 DIABETES MELLITUS WITH COMPLICATION, WITH LONG-TERM CURRENT USE OF INSULIN: ICD-10-CM

## 2018-03-01 RX ORDER — LIRAGLUTIDE 6 MG/ML
INJECTION SUBCUTANEOUS
Qty: 9 PEN | Refills: 0 | Status: SHIPPED | OUTPATIENT
Start: 2018-03-01 | End: 2018-04-07 | Stop reason: SDUPTHER

## 2018-03-10 LAB
ALBUMIN SERPL-MCNC: 4.2 G/DL (ref 3.5–5.2)
ALBUMIN/CREAT UR: 52.2 MG/G CREAT (ref 0–30)
ALBUMIN/GLOB SERPL: 1.3 G/DL
ALP SERPL-CCNC: 57 U/L (ref 39–117)
ALT SERPL-CCNC: 29 U/L (ref 1–33)
AST SERPL-CCNC: 14 U/L (ref 1–32)
BILIRUB SERPL-MCNC: 0.4 MG/DL (ref 0.1–1.2)
BUN SERPL-MCNC: 12 MG/DL (ref 6–20)
BUN/CREAT SERPL: 15.8 (ref 7–25)
CALCIUM SERPL-MCNC: 10.4 MG/DL (ref 8.6–10.5)
CHLORIDE SERPL-SCNC: 98 MMOL/L (ref 98–107)
CO2 SERPL-SCNC: 24.3 MMOL/L (ref 22–29)
CREAT SERPL-MCNC: 0.76 MG/DL (ref 0.57–1)
CREAT UR-MCNC: 91.3 MG/DL
GFR SERPLBLD CREATININE-BSD FMLA CKD-EPI: 104 ML/MIN/1.73
GFR SERPLBLD CREATININE-BSD FMLA CKD-EPI: 86 ML/MIN/1.73
GLOBULIN SER CALC-MCNC: 3.2 GM/DL
GLUCOSE SERPL-MCNC: 101 MG/DL (ref 65–99)
HBA1C MFR BLD: 6.4 % (ref 4.8–5.6)
MICROALBUMIN UR-MCNC: 47.7 UG/ML
POTASSIUM SERPL-SCNC: 4.5 MMOL/L (ref 3.5–5.2)
PROT SERPL-MCNC: 7.4 G/DL (ref 6–8.5)
SODIUM SERPL-SCNC: 139 MMOL/L (ref 136–145)
T4 FREE SERPL-MCNC: 1.11 NG/DL (ref 0.93–1.7)
TSH SERPL DL<=0.005 MIU/L-ACNC: 5.34 MIU/ML (ref 0.27–4.2)

## 2018-03-15 ENCOUNTER — OFFICE VISIT (OUTPATIENT)
Dept: ENDOCRINOLOGY | Age: 38
End: 2018-03-15

## 2018-03-15 VITALS
DIASTOLIC BLOOD PRESSURE: 64 MMHG | BODY MASS INDEX: 37.03 KG/M2 | HEART RATE: 107 BPM | SYSTOLIC BLOOD PRESSURE: 120 MMHG | HEIGHT: 63 IN | WEIGHT: 209 LBS

## 2018-03-15 DIAGNOSIS — R63.5 ABNORMAL WEIGHT GAIN: ICD-10-CM

## 2018-03-15 DIAGNOSIS — IMO0002 UNCONTROLLED TYPE 2 DIABETES MELLITUS WITH COMPLICATION, WITH LONG-TERM CURRENT USE OF INSULIN: Primary | ICD-10-CM

## 2018-03-15 DIAGNOSIS — E03.9 ADULT HYPOTHYROIDISM: ICD-10-CM

## 2018-03-15 DIAGNOSIS — Z79.4 ENCOUNTER FOR LONG-TERM (CURRENT) USE OF INSULIN (HCC): ICD-10-CM

## 2018-03-15 DIAGNOSIS — E16.1 HYPERINSULINISM: ICD-10-CM

## 2018-03-15 PROCEDURE — 99214 OFFICE O/P EST MOD 30 MIN: CPT | Performed by: INTERNAL MEDICINE

## 2018-03-15 RX ORDER — LIDOCAINE 50 MG/G
1 PATCH TOPICAL DAILY PRN
COMMUNITY
Start: 2018-01-09

## 2018-03-15 NOTE — PROGRESS NOTES
38 y.o.    Patient Care Team:  PORSHA Moon as PCP - General (Family Medicine)  Travis DURBIN MD as Consulting Physician (Endocrinology)  Liz Davidson MD as Consulting Physician (Hematology and Oncology)    Chief Complaint:      F/U TYPE 2 DIABETES, UNCONTROLLED.  HYPOTHYROIDISM.  HERE TO DISCUSS LAB RESULTS..  Subjective     HPI   Patient is a 38-year-old white female with a past history of uncontrolled type 2 diabetes mellitus and hypothyroidism came for follow-up  Patient was diagnosed with diabetes in April 2015 and has been on medication ever since  She is currently taking victoza 1.8 mg daily at night, Levemir 40 units daily at night,XIGDUO 1 tablet daily at night  She forgot to bring her glucometer today  She reports that most of the blood sugars are in the 100-120 range  She's not been checking frequently over the past few weeks  She has not been taking NovoLog other than when necessary and has Not taken any in the past one month    Hypothyroidism  Patient is currently taking levothyroxine 150 µg daily.  She reported that she ran out of medication February 28 and has not restarted it until March 2.  Abnormal weight gain  Patient is currently gaining weight  She weighs 209 pounds with a BMI of 37      Interval HistoryDecember 19, 2016         Summary  New-onset diabetes  Patient was apparently admitted to the hospital in April 2015 with diabetic ketoacidosis, pneumonia, pancreatitis and was diagnosed with severe hyperlipidemia, diabetes and was started on Levemir 20 units at bedtime and NovoLog 5 units before each meal and discharge home.. Patient only reports symptoms for a brief period prior to admission including nausea vomiting abdominal pain excessive thirst etc.  Since discharge patient is noted that her blood sugars are still elevated she increase the insulin NovoLog to 10 units before each meal and believes that currently her blood sugars are mostly less than 200  She had one  episode of hypoglycemia at 89 in the early afternoon when she was feeling slightly jittery and nervous  Hypothyroidism  Patient was diagnosed with hypothyroidism at the age of 12 and has been on medication ever since.  Hyperlipidemia  New diagnosis with a triglyceride level of greater than 1000 prior to admission to the hospital. She does recall being told that her triglycerides and cholesterol are always higher for the past 20 years but was never on treatment.  Abnormal weight gain  Patient reports that she andhas gained weight significantly over the past 5-10 years. Currently she weighs 196 pounds with a BMI of 34.7.  Patient appears to be comfortable with Accu-Cheks and insulin regimen but is seeking an alternative regimen if possible. She is unaware of her diabetes status whether she is type I or type II.  She has a strong family history of type 2 diabetes.  In the previous visit patient was advised to try Victoza. She apparently had good response but her insurance would not cover Victoza due to history pancreatitis  So she had to stop this medication and started gaining weight again.       Patient was not able to get SAXENDA approved due to previous history of pancreatitis.  She reported that she lost a few pounds since last visit  She also believes that the new medication ZIGDUO is helping somewhat and losing weight as well.  She continues Levemir 40 units at night  NovoLog 15-17 units usually at dinner  Continues levothyroxine 100 mcg since November 2015.      Patient has leukocytosis and is currently being evaluated by neurology as well as ENT  She has a history pancreatitis April 21, 2015 and has used Victoza 4-5months after that  She wants to restart that medication  The following portions of the patient's history were reviewed and updated as appropriate: allergies, current medications, past family history, past medical history, past social history, past surgical history and problem list.    Past Medical  History:   Diagnosis Date   • Diabetes mellitus    • Fatty liver    • H/O Pancreatitis, acute    • Hyperlipidemia    • Hypothyroidism    • Left ovarian cyst      Family History   Problem Relation Age of Onset   • Diabetes Mother    • Hyperlipidemia Mother    • Hypertension Mother      Social History     Social History   • Marital status:      Spouse name: N/A   • Number of children: N/A   • Years of education: N/A     Occupational History   • Office of       Social History Main Topics   • Smoking status: Current Every Day Smoker     Packs/day: 0.50     Types: Cigarettes   • Smokeless tobacco: Never Used   • Alcohol use No   • Drug use: No   • Sexual activity: Defer     Other Topics Concern   • Not on file     Social History Narrative   • No narrative on file     Allergies   Allergen Reactions   • No Known Drug Allergy        Current Outpatient Prescriptions:   •  amoxicillin (AMOXIL) 500 MG capsule, Take 2 capsules by mouth 2 (Two) Times a Day., Disp: 40 capsule, Rfl: 0  •  gemfibrozil (LOPID) 600 MG tablet, Take 1 tablet by mouth 2 (Two) Times a Day., Disp: 60 tablet, Rfl: 5  •  glucose blood test strip, 1 each by Other route 4 (four) times a day. Use as instructed, Disp: 400 each, Rfl: 1  •  insulin aspart (NOVOLOG FLEXPEN) 100 UNIT/ML solution pen-injector sc pen, Inject 15 Units under the skin., Disp: , Rfl:   •  insulin detemir (LEVEMIR FLEXTOUCH) 100 UNIT/ML injection, Inject 50 units under the skin at night, Disp: 15 pen, Rfl: 0  •  Insulin Pen Needle 32G X 4 MM misc, 5 TIMES DAILY SC USE, Disp: 200 each, Rfl: 4  •  levothyroxine (SYNTHROID, LEVOTHROID) 150 MCG tablet, , Disp: , Rfl:   •  levothyroxine (SYNTHROID, LEVOTHROID) 150 MCG tablet, TAKE ONE TABLET BY MOUTH DAILY, Disp: 30 tablet, Rfl: 3  •  norgestimate-ethinyl estradiol (ORTHO TRI-CYCLEN,TRINESSA) 0.18/0.215/0.25 MG-35 MCG per tablet, Take 1 tablet by mouth Daily., Disp: 28 tablet, Rfl: 6  •  ondansetron ODT (ZOFRAN ODT) 8  "MG disintegrating tablet, Take 1 tablet by mouth Every 8 (Eight) Hours As Needed for Nausea or Vomiting., Disp: 21 tablet, Rfl: 0  •  ranitidine (ZANTAC) 150 MG tablet, , Disp: , Rfl:   •  TRI-SPRINTEC 0.18/0.215/0.25 MG-35 MCG per tablet, TAKE ONE TABLET BY MOUTH DAILY, Disp: 28 tablet, Rfl: 5  •  VICTOZA 18 MG/3ML solution pen-injector injection, DIAL AND INJECT SUBCUTANEOUSLY 1.8MG DAILY, Disp: 9 pen, Rfl: 0  •  XIGDUO XR 5-1000 MG tablet sustained-release 24 hour, TAKE ONE TABLET BY MOUTH TWICE A DAY, Disp: 60 tablet, Rfl: 0        Review of Systems   Constitutional: Negative for chills, fatigue and fever.   Cardiovascular: Negative for chest pain and palpitations.   Gastrointestinal: Negative for abdominal pain, constipation, diarrhea, nausea and vomiting.   Endocrine: Negative for cold intolerance and heat intolerance.   All other systems reviewed and are negative.      Objective       Vitals:    03/15/18 1608   BP: 120/64   Pulse: 107   Weight: 94.8 kg (209 lb)   Height: 160 cm (63\")     Body mass index is 37.02 kg/m².      Physical Exam   Constitutional: She is oriented to person, place, and time. She appears well-developed and well-nourished.   HENT:   Head: Normocephalic and atraumatic.   Eyes: EOM are normal. Pupils are equal, round, and reactive to light.   Neck: Normal range of motion. Neck supple. No thyromegaly present.   Cardiovascular: Normal rate, regular rhythm, normal heart sounds and intact distal pulses.    Pulmonary/Chest: Effort normal and breath sounds normal.   Abdominal: Soft. Bowel sounds are normal. She exhibits distension. There is no tenderness.   Musculoskeletal: She exhibits no edema.   Neurological: She is alert and oriented to person, place, and time.   Skin: Skin is warm and dry.   Psychiatric: She has a normal mood and affect. Her behavior is normal.   Nursing note and vitals reviewed.    Results Review:     I reviewed the patient's new clinical results.    Medical records " reviewed  Summary:      Results Encounter on 01/18/2018   Component Date Value Ref Range Status   • Glucose 03/10/2018 101* 65 - 99 mg/dL Final   • BUN 03/10/2018 12  6 - 20 mg/dL Final   • Creatinine 03/10/2018 0.76  0.57 - 1.00 mg/dL Final   • eGFR Non African Am 03/10/2018 86  >60 mL/min/1.73 Final   • eGFR African Am 03/10/2018 104  >60 mL/min/1.73 Final   • BUN/Creatinine Ratio 03/10/2018 15.8  7.0 - 25.0 Final   • Sodium 03/10/2018 139  136 - 145 mmol/L Final   • Potassium 03/10/2018 4.5  3.5 - 5.2 mmol/L Final   • Chloride 03/10/2018 98  98 - 107 mmol/L Final   • Total CO2 03/10/2018 24.3  22.0 - 29.0 mmol/L Final   • Calcium 03/10/2018 10.4  8.6 - 10.5 mg/dL Final   • Total Protein 03/10/2018 7.4  6.0 - 8.5 g/dL Final   • Albumin 03/10/2018 4.20  3.50 - 5.20 g/dL Final   • Globulin 03/10/2018 3.2  gm/dL Final   • A/G Ratio 03/10/2018 1.3  g/dL Final   • Total Bilirubin 03/10/2018 0.4  0.1 - 1.2 mg/dL Final   • Alkaline Phosphatase 03/10/2018 57  39 - 117 U/L Final   • AST (SGOT) 03/10/2018 14  1 - 32 U/L Final   • ALT (SGPT) 03/10/2018 29  1 - 33 U/L Final   • Hemoglobin A1C 03/10/2018 6.40* 4.80 - 5.60 % Final    Comment: Hemoglobin A1C Ranges:  Increased Risk for Diabetes  5.7% to 6.4%  Diabetes                     >= 6.5%  Diabetic Goal                < 7.0%     • Free T4 03/10/2018 1.11  0.93 - 1.70 ng/dL Final   • TSH 03/10/2018 5.340* 0.270 - 4.200 mIU/mL Final   • Creatinine, Urine 03/10/2018 91.3  Not Estab. mg/dL Final   • Microalbumin, Urine 03/10/2018 47.7  Not Estab. ug/mL Final   • Microalbumin/Creatinine Ratio 03/10/2018 52.2* 0.0 - 30.0 mg/g creat Final     Lab Results   Component Value Date    HGBA1C 6.40 (H) 03/09/2018    HGBA1C 7.30 (H) 08/10/2017    HGBA1C 6.81 (H) 05/05/2017     Lab Results   Component Value Date    MICROALBUR 47.7 03/09/2018    CREATININE 0.76 03/09/2018     Imaging Results (most recent)     None                Assessment and Plan:    Cora was seen today for diabetes  "and hypothyroidism.    Diagnoses and all orders for this visit:    Uncontrolled type 2 diabetes mellitus with complication, with long-term current use of insulin  -     Ambulatory Referral for Diabetic Eye Exam-Ophthalmology    Hyperinsulinism  -     Ambulatory Referral for Diabetic Eye Exam-Ophthalmology    Encounter for long-term (current) use of insulin  -     Ambulatory Referral for Diabetic Eye Exam-Ophthalmology    Abnormal weight gain    Adult hypothyroidism           1 uncontrolled type 2 diabetes mellitus- last known hemoglobin A1c is 6.9% in Nov 2015  2 hypothyroidism  3 hyperlipidemia I advised the patient to start Crestor 20 mg daily  4 abnormal weight gain  5 insulin management     Glucometer download did not reveal any blood sugars  Patient reports that majority are in the 100-120 range  She has not been checking in the past few weeks  She has been reporting decreasing vision and needs to see her eye doctor    Patient is currently taking Levemir 40 units at night and victoza 1.8 mg at night and Xigduo 1 tablet daily at night  Patient reports that she is taking NovoLog only as needed  She ran out of levothyroxine on February 28 through March 2    The total time spent for old record and lab review and face- to- face was more than 25 min of which greater than 15 min of time ( greater than 50% of the total time )  was spent face to face with the patient counseling and coordination of care on recommended evaluation and treatment options, instructions for management/treatment and /or follow up  and importance of compliance with chosen management or treatment options       Travis Oropeza MD. FACE    03/15/18      EMR Dragon / transcription disclaimer:     \"Dictated utilizing Dragon dictation\".         "

## 2018-04-09 RX ORDER — LIRAGLUTIDE 6 MG/ML
INJECTION SUBCUTANEOUS
Qty: 9 PEN | Refills: 1 | Status: SHIPPED | OUTPATIENT
Start: 2018-04-09 | End: 2018-06-12 | Stop reason: SDUPTHER

## 2018-04-16 ENCOUNTER — OFFICE VISIT (OUTPATIENT)
Dept: FAMILY MEDICINE CLINIC | Facility: CLINIC | Age: 38
End: 2018-04-16

## 2018-04-16 VITALS
OXYGEN SATURATION: 98 % | DIASTOLIC BLOOD PRESSURE: 60 MMHG | HEIGHT: 63 IN | WEIGHT: 208 LBS | HEART RATE: 98 BPM | SYSTOLIC BLOOD PRESSURE: 122 MMHG | BODY MASS INDEX: 36.86 KG/M2

## 2018-04-16 DIAGNOSIS — R59.1 LYMPHADENOPATHY: ICD-10-CM

## 2018-04-16 DIAGNOSIS — J01.00 ACUTE NON-RECURRENT MAXILLARY SINUSITIS: Primary | ICD-10-CM

## 2018-04-16 PROCEDURE — 99213 OFFICE O/P EST LOW 20 MIN: CPT | Performed by: NURSE PRACTITIONER

## 2018-04-16 RX ORDER — AMOXICILLIN AND CLAVULANATE POTASSIUM 875; 125 MG/1; MG/1
1 TABLET, FILM COATED ORAL EVERY 12 HOURS SCHEDULED
Qty: 20 TABLET | Refills: 0 | Status: SHIPPED | OUTPATIENT
Start: 2018-04-16 | End: 2018-06-29

## 2018-04-16 NOTE — PROGRESS NOTES
"Cora Gutiérrez is a 38 y.o. female.Patient states that she has a lump on the left side her neck that presented 3 days ago. The area started out sore. The area has decreased in size. In the past she had an abscess that required removal on her back.    Also having some sinus issues, pain and pressure for over 2 weeks.Has not used any otc products.      Assessment/Plan   Problem List Items Addressed This Visit     None      Visit Diagnoses     Acute non-recurrent maxillary sinusitis    -  Primary    Relevant Medications    amoxicillin-clavulanate (AUGMENTIN) 875-125 MG per tablet    Lymphadenopathy        Relevant Orders    CBC & Differential             No Follow-up on file.  There are no Patient Instructions on file for this visit.    Chief Complaint   Patient presents with   • Mass     Social History   Substance Use Topics   • Smoking status: Current Every Day Smoker     Packs/day: 0.50     Types: Cigarettes   • Smokeless tobacco: Never Used   • Alcohol use No       History of Present Illness     The following portions of the patient's history were reviewed and updated as appropriate:PMHroutine: Social history , Allergies, Current Medications and Active Problem List    Review of Systems   Constitutional: Negative for fever.   HENT: Positive for rhinorrhea, sinus pain and sinus pressure. Negative for ear pain, postnasal drip and sore throat.    Hematological: Positive for adenopathy.       Objective   Vitals:    04/16/18 1342   BP: 122/60   Pulse: 98   SpO2: 98%   Weight: 94.3 kg (208 lb)   Height: 160 cm (63\")     Body mass index is 36.85 kg/m².  Physical Exam   Constitutional: She appears well-developed and well-nourished. No distress.   HENT:   Head: Normocephalic and atraumatic.   Right Ear: External ear normal.   Left Ear: External ear normal.   Op with drainage  + pain and pressure to maxillary and frontal sinuses with palpation   Eyes: EOM are normal.   Neck: Neck supple. No thyromegaly present. "   Cardiovascular: Normal rate, regular rhythm and normal heart sounds.    Pulmonary/Chest: Effort normal and breath sounds normal.   Musculoskeletal: Normal range of motion.   Neurological: She is alert.   Skin: Skin is warm.   Small palpable painful tender area to left madible   Nursing note and vitals reviewed.    Reviewed Data:  No visits with results within 1 Month(s) from this visit.   Latest known visit with results is:   Results Encounter on 01/18/2018   Component Date Value Ref Range Status   • Glucose 03/10/2018 101* 65 - 99 mg/dL Final   • BUN 03/10/2018 12  6 - 20 mg/dL Final   • Creatinine 03/10/2018 0.76  0.57 - 1.00 mg/dL Final   • eGFR Non African Am 03/10/2018 86  >60 mL/min/1.73 Final   • eGFR African Am 03/10/2018 104  >60 mL/min/1.73 Final   • BUN/Creatinine Ratio 03/10/2018 15.8  7.0 - 25.0 Final   • Sodium 03/10/2018 139  136 - 145 mmol/L Final   • Potassium 03/10/2018 4.5  3.5 - 5.2 mmol/L Final   • Chloride 03/10/2018 98  98 - 107 mmol/L Final   • Total CO2 03/10/2018 24.3  22.0 - 29.0 mmol/L Final   • Calcium 03/10/2018 10.4  8.6 - 10.5 mg/dL Final   • Total Protein 03/10/2018 7.4  6.0 - 8.5 g/dL Final   • Albumin 03/10/2018 4.20  3.50 - 5.20 g/dL Final   • Globulin 03/10/2018 3.2  gm/dL Final   • A/G Ratio 03/10/2018 1.3  g/dL Final   • Total Bilirubin 03/10/2018 0.4  0.1 - 1.2 mg/dL Final   • Alkaline Phosphatase 03/10/2018 57  39 - 117 U/L Final   • AST (SGOT) 03/10/2018 14  1 - 32 U/L Final   • ALT (SGPT) 03/10/2018 29  1 - 33 U/L Final   • Hemoglobin A1C 03/10/2018 6.40* 4.80 - 5.60 % Final    Comment: Hemoglobin A1C Ranges:  Increased Risk for Diabetes  5.7% to 6.4%  Diabetes                     >= 6.5%  Diabetic Goal                < 7.0%     • Free T4 03/10/2018 1.11  0.93 - 1.70 ng/dL Final   • TSH 03/10/2018 5.340* 0.270 - 4.200 mIU/mL Final   • Creatinine, Urine 03/10/2018 91.3  Not Estab. mg/dL Final   • Microalbumin, Urine 03/10/2018 47.7  Not Estab. ug/mL Final   •  Microalbumin/Creatinine Ratio 03/10/2018 52.2* 0.0 - 30.0 mg/g creat Final     ABX started and will call with CBC results

## 2018-04-17 LAB
BASOPHILS # BLD AUTO: 0 X10E3/UL (ref 0–0.2)
BASOPHILS NFR BLD AUTO: 0 %
EOSINOPHIL # BLD AUTO: 0.4 X10E3/UL (ref 0–0.4)
EOSINOPHIL NFR BLD AUTO: 4 %
ERYTHROCYTE [DISTWIDTH] IN BLOOD BY AUTOMATED COUNT: 14.3 % (ref 12.3–15.4)
HCT VFR BLD AUTO: 40.3 % (ref 34–46.6)
HGB BLD-MCNC: 13.6 G/DL (ref 11.1–15.9)
IMM GRANULOCYTES # BLD: 0 X10E3/UL (ref 0–0.1)
IMM GRANULOCYTES NFR BLD: 0 %
LYMPHOCYTES # BLD AUTO: 4.5 X10E3/UL (ref 0.7–3.1)
LYMPHOCYTES NFR BLD AUTO: 37 %
MCH RBC QN AUTO: 28.9 PG (ref 26.6–33)
MCHC RBC AUTO-ENTMCNC: 33.7 G/DL (ref 31.5–35.7)
MCV RBC AUTO: 86 FL (ref 79–97)
MONOCYTES # BLD AUTO: 0.6 X10E3/UL (ref 0.1–0.9)
MONOCYTES NFR BLD AUTO: 5 %
NEUTROPHILS # BLD AUTO: 6.7 X10E3/UL (ref 1.4–7)
NEUTROPHILS NFR BLD AUTO: 54 %
PLATELET # BLD AUTO: 302 X10E3/UL (ref 150–379)
RBC # BLD AUTO: 4.71 X10E6/UL (ref 3.77–5.28)
WBC # BLD AUTO: 12.3 X10E3/UL (ref 3.4–10.8)

## 2018-04-30 RX ORDER — DAPAGLIFLOZIN AND METFORMIN HYDROCHLORIDE 5; 1000 MG/1; MG/1
TABLET, FILM COATED, EXTENDED RELEASE ORAL
Qty: 60 TABLET | Refills: 0 | Status: SHIPPED | OUTPATIENT
Start: 2018-04-30 | End: 2018-06-01 | Stop reason: SDUPTHER

## 2018-05-10 DIAGNOSIS — E03.9 ACQUIRED HYPOTHYROIDISM: ICD-10-CM

## 2018-05-10 RX ORDER — LEVOTHYROXINE SODIUM 0.15 MG/1
TABLET ORAL
Qty: 30 TABLET | Refills: 2 | Status: SHIPPED | OUTPATIENT
Start: 2018-05-10 | End: 2018-06-29 | Stop reason: SDUPTHER

## 2018-05-14 RX ORDER — INSULIN DETEMIR 100 [IU]/ML
INJECTION, SOLUTION SUBCUTANEOUS
Qty: 15 PEN | Refills: 3 | Status: SHIPPED | OUTPATIENT
Start: 2018-05-14 | End: 2018-12-11 | Stop reason: SDUPTHER

## 2018-06-01 RX ORDER — DAPAGLIFLOZIN AND METFORMIN HYDROCHLORIDE 5; 1000 MG/1; MG/1
TABLET, FILM COATED, EXTENDED RELEASE ORAL
Qty: 60 TABLET | Refills: 0 | Status: SHIPPED | OUTPATIENT
Start: 2018-06-01 | End: 2018-06-29 | Stop reason: SDUPTHER

## 2018-06-12 RX ORDER — LIRAGLUTIDE 6 MG/ML
INJECTION SUBCUTANEOUS
Qty: 27 ML | Refills: 0 | Status: SHIPPED | OUTPATIENT
Start: 2018-06-12 | End: 2018-10-09 | Stop reason: SDUPTHER

## 2018-06-22 ENCOUNTER — LAB (OUTPATIENT)
Dept: ENDOCRINOLOGY | Age: 38
End: 2018-06-22

## 2018-06-22 DIAGNOSIS — IMO0002 UNCONTROLLED TYPE 2 DIABETES MELLITUS WITH COMPLICATION, WITH LONG-TERM CURRENT USE OF INSULIN: Primary | ICD-10-CM

## 2018-06-22 DIAGNOSIS — E03.9 ACQUIRED HYPOTHYROIDISM: ICD-10-CM

## 2018-06-22 DIAGNOSIS — IMO0002 UNCONTROLLED TYPE 2 DIABETES MELLITUS WITH COMPLICATION, WITH LONG-TERM CURRENT USE OF INSULIN: ICD-10-CM

## 2018-06-23 LAB
ALBUMIN SERPL-MCNC: 4.4 G/DL (ref 3.5–5.2)
ALBUMIN/CREAT UR: 45.5 MG/G CREAT (ref 0–30)
ALBUMIN/GLOB SERPL: 1.3 G/DL
ALP SERPL-CCNC: 50 U/L (ref 39–117)
ALT SERPL-CCNC: 23 U/L (ref 1–33)
AST SERPL-CCNC: 18 U/L (ref 1–32)
BILIRUB SERPL-MCNC: 0.2 MG/DL (ref 0.1–1.2)
BUN SERPL-MCNC: 13 MG/DL (ref 6–20)
BUN/CREAT SERPL: 16 (ref 7–25)
CALCIUM SERPL-MCNC: 9.4 MG/DL (ref 8.6–10.5)
CHLORIDE SERPL-SCNC: 97 MMOL/L (ref 98–107)
CO2 SERPL-SCNC: 19.4 MMOL/L (ref 22–29)
CREAT SERPL-MCNC: 0.81 MG/DL (ref 0.57–1)
CREAT UR-MCNC: 69.4 MG/DL
GFR SERPLBLD CREATININE-BSD FMLA CKD-EPI: 80 ML/MIN/1.73
GFR SERPLBLD CREATININE-BSD FMLA CKD-EPI: 96 ML/MIN/1.73
GLOBULIN SER CALC-MCNC: 3.4 GM/DL
GLUCOSE SERPL-MCNC: 100 MG/DL (ref 65–99)
HBA1C MFR BLD: 6.8 % (ref 4.8–5.6)
MICROALBUMIN UR-MCNC: 31.6 UG/ML
POTASSIUM SERPL-SCNC: 4.8 MMOL/L (ref 3.5–5.2)
PROT SERPL-MCNC: 7.8 G/DL (ref 6–8.5)
SODIUM SERPL-SCNC: 136 MMOL/L (ref 136–145)
T4 FREE SERPL-MCNC: 0.96 NG/DL (ref 0.93–1.7)
TSH SERPL DL<=0.005 MIU/L-ACNC: 11.67 MIU/ML (ref 0.27–4.2)

## 2018-06-29 ENCOUNTER — OFFICE VISIT (OUTPATIENT)
Dept: ENDOCRINOLOGY | Age: 38
End: 2018-06-29

## 2018-06-29 VITALS
BODY MASS INDEX: 37.28 KG/M2 | SYSTOLIC BLOOD PRESSURE: 108 MMHG | HEIGHT: 63 IN | DIASTOLIC BLOOD PRESSURE: 60 MMHG | HEART RATE: 105 BPM | WEIGHT: 210.4 LBS

## 2018-06-29 DIAGNOSIS — Z79.4 ENCOUNTER FOR LONG-TERM (CURRENT) USE OF INSULIN (HCC): ICD-10-CM

## 2018-06-29 DIAGNOSIS — E78.49 OTHER HYPERLIPIDEMIA: ICD-10-CM

## 2018-06-29 DIAGNOSIS — IMO0002 UNCONTROLLED TYPE 2 DIABETES MELLITUS WITH COMPLICATION, WITH LONG-TERM CURRENT USE OF INSULIN: Primary | ICD-10-CM

## 2018-06-29 DIAGNOSIS — IMO0002 UNCONTROLLED TYPE II DIABETES MELLITUS WITH NEPHROPATHY: ICD-10-CM

## 2018-06-29 DIAGNOSIS — E03.9 ACQUIRED HYPOTHYROIDISM: ICD-10-CM

## 2018-06-29 DIAGNOSIS — E16.1 HYPERINSULINISM: ICD-10-CM

## 2018-06-29 DIAGNOSIS — R63.5 ABNORMAL WEIGHT GAIN: ICD-10-CM

## 2018-06-29 DIAGNOSIS — E03.9 ADULT HYPOTHYROIDISM: ICD-10-CM

## 2018-06-29 PROCEDURE — 99214 OFFICE O/P EST MOD 30 MIN: CPT | Performed by: INTERNAL MEDICINE

## 2018-06-29 RX ORDER — LEVOTHYROXINE SODIUM 0.15 MG/1
150 TABLET ORAL DAILY
Qty: 30 TABLET | Refills: 5 | Status: SHIPPED | OUTPATIENT
Start: 2018-06-29 | End: 2018-12-17

## 2018-06-29 RX ORDER — ROSUVASTATIN CALCIUM 40 MG/1
40 TABLET, COATED ORAL DAILY
Qty: 30 TABLET | Refills: 11 | Status: SHIPPED | OUTPATIENT
Start: 2018-06-29 | End: 2019-07-20 | Stop reason: SDUPTHER

## 2018-07-01 PROBLEM — IMO0002 UNCONTROLLED TYPE II DIABETES MELLITUS WITH NEPHROPATHY: Status: ACTIVE | Noted: 2018-07-01

## 2018-07-24 ENCOUNTER — OFFICE VISIT (OUTPATIENT)
Dept: FAMILY MEDICINE CLINIC | Facility: CLINIC | Age: 38
End: 2018-07-24

## 2018-07-24 VITALS
SYSTOLIC BLOOD PRESSURE: 110 MMHG | DIASTOLIC BLOOD PRESSURE: 62 MMHG | HEART RATE: 89 BPM | OXYGEN SATURATION: 98 % | HEIGHT: 63 IN | BODY MASS INDEX: 36.86 KG/M2 | WEIGHT: 208 LBS

## 2018-07-24 DIAGNOSIS — M54.6 ACUTE BILATERAL THORACIC BACK PAIN: Primary | ICD-10-CM

## 2018-07-24 PROCEDURE — 99213 OFFICE O/P EST LOW 20 MIN: CPT | Performed by: NURSE PRACTITIONER

## 2018-07-24 RX ORDER — BACLOFEN 10 MG/1
10 TABLET ORAL 3 TIMES DAILY
Qty: 30 TABLET | Refills: 1 | Status: SHIPPED | OUTPATIENT
Start: 2018-07-24 | End: 2018-11-02

## 2018-07-24 RX ORDER — CYCLOBENZAPRINE HCL 5 MG
5 TABLET ORAL
COMMUNITY
Start: 2018-07-19 | End: 2018-07-29

## 2018-07-24 NOTE — PROGRESS NOTES
"Cora Gutiérrez is a 38 y.o. female.Patient presents with mid back pain, NKI, present for about 8 days. Pain is described as muscle spasms and is constant. She was seen at an Immediate care 5 days ago and was given muscle relaxer's, which helps some.    Is doing better but cant take the muscle relaxer during the day because it makes her sleepy.      Assessment/Plan   Problem List Items Addressed This Visit     None             No Follow-up on file.  There are no Patient Instructions on file for this visit.    Chief Complaint   Patient presents with   • Back Pain     Social History   Substance Use Topics   • Smoking status: Current Every Day Smoker     Packs/day: 0.50     Types: Cigarettes   • Smokeless tobacco: Never Used   • Alcohol use No       History of Present Illness     The following portions of the patient's history were reviewed and updated as appropriate:PMHroutine: Social history , Allergies, Current Medications and Active Problem List    Review of Systems   Musculoskeletal: Positive for back pain and myalgias. Negative for neck pain.       Objective   Vitals:    07/24/18 0859   BP: 110/62   Pulse: 89   SpO2: 98%   Weight: 94.3 kg (208 lb)   Height: 160 cm (63\")     Body mass index is 36.85 kg/m².  Physical Exam   Constitutional: She is oriented to person, place, and time. She appears well-developed and well-nourished. She appears distressed.   Musculoskeletal: Normal range of motion. She exhibits tenderness.   Tenderness to para spinous muscles   Neurological: She is alert and oriented to person, place, and time. She displays normal reflexes. No cranial nerve deficit or sensory deficit. She exhibits normal muscle tone. Coordination normal.   Nursing note and vitals reviewed.    Reviewed Data:  No visits with results within 1 Month(s) from this visit.   Latest known visit with results is:   Lab on 06/22/2018   Component Date Value Ref Range Status   • Glucose 06/22/2018 100* 65 - 99 mg/dL Final   • BUN " 06/22/2018 13  6 - 20 mg/dL Final   • Creatinine 06/22/2018 0.81  0.57 - 1.00 mg/dL Final   • eGFR Non  Am 06/22/2018 80  >60 mL/min/1.73 Final   • eGFR African Am 06/22/2018 96  >60 mL/min/1.73 Final   • BUN/Creatinine Ratio 06/22/2018 16.0  7.0 - 25.0 Final   • Sodium 06/22/2018 136  136 - 145 mmol/L Final   • Potassium 06/22/2018 4.8  3.5 - 5.2 mmol/L Final   • Chloride 06/22/2018 97* 98 - 107 mmol/L Final   • Total CO2 06/22/2018 19.4* 22.0 - 29.0 mmol/L Final   • Calcium 06/22/2018 9.4  8.6 - 10.5 mg/dL Final   • Total Protein 06/22/2018 7.8  6.0 - 8.5 g/dL Final   • Albumin 06/22/2018 4.40  3.50 - 5.20 g/dL Final   • Globulin 06/22/2018 3.4  gm/dL Final   • A/G Ratio 06/22/2018 1.3  g/dL Final   • Total Bilirubin 06/22/2018 0.2  0.1 - 1.2 mg/dL Final   • Alkaline Phosphatase 06/22/2018 50  39 - 117 U/L Final   • AST (SGOT) 06/22/2018 18  1 - 32 U/L Final   • ALT (SGPT) 06/22/2018 23  1 - 33 U/L Final   • Hemoglobin A1C 06/22/2018 6.80* 4.80 - 5.60 % Final    Comment: Hemoglobin A1C Ranges:  Increased Risk for Diabetes  5.7% to 6.4%  Diabetes                     >= 6.5%  Diabetic Goal                < 7.0%     • Free T4 06/22/2018 0.96  0.93 - 1.70 ng/dL Final   • TSH 06/22/2018 11.670* 0.270 - 4.200 mIU/mL Final   • Creatinine, Urine 06/22/2018 69.4  Not Estab. mg/dL Final   • Microalbumin, Urine 06/22/2018 31.6  Not Estab. ug/mL Final   • Microalbumin/Creatinine Ratio 06/22/2018 45.5* 0.0 - 30.0 mg/g creat Final

## 2018-07-25 RX ORDER — NORGESTIMATE AND ETHINYL ESTRADIOL 7DAYSX3 28
KIT ORAL
Qty: 84 TABLET | Refills: 1 | Status: SHIPPED | OUTPATIENT
Start: 2018-07-25 | End: 2019-01-21 | Stop reason: SDUPTHER

## 2018-09-28 ENCOUNTER — RESULTS ENCOUNTER (OUTPATIENT)
Dept: ENDOCRINOLOGY | Age: 38
End: 2018-09-28

## 2018-09-28 DIAGNOSIS — E03.9 ADULT HYPOTHYROIDISM: ICD-10-CM

## 2018-09-28 DIAGNOSIS — IMO0002 UNCONTROLLED TYPE 2 DIABETES MELLITUS WITH COMPLICATION, WITH LONG-TERM CURRENT USE OF INSULIN: Primary | ICD-10-CM

## 2018-09-28 DIAGNOSIS — IMO0002 UNCONTROLLED TYPE 2 DIABETES MELLITUS WITH COMPLICATION, WITH LONG-TERM CURRENT USE OF INSULIN: ICD-10-CM

## 2018-10-09 RX ORDER — LIRAGLUTIDE 6 MG/ML
INJECTION SUBCUTANEOUS
Qty: 9 PEN | Refills: 1 | Status: SHIPPED | OUTPATIENT
Start: 2018-10-09 | End: 2018-12-11 | Stop reason: SDUPTHER

## 2018-10-24 LAB
ALBUMIN SERPL-MCNC: 4.5 G/DL (ref 3.5–5.2)
ALBUMIN/CREAT UR: 179.5 MG/G CREAT (ref 0–30)
ALBUMIN/GLOB SERPL: 1.5 G/DL
ALP SERPL-CCNC: 57 U/L (ref 39–117)
ALT SERPL-CCNC: 18 U/L (ref 1–33)
AST SERPL-CCNC: 17 U/L (ref 1–32)
BILIRUB SERPL-MCNC: 0.2 MG/DL (ref 0.1–1.2)
BUN SERPL-MCNC: 12 MG/DL (ref 6–20)
BUN/CREAT SERPL: 13.2 (ref 7–25)
CALCIUM SERPL-MCNC: 9.9 MG/DL (ref 8.6–10.5)
CHLORIDE SERPL-SCNC: 101 MMOL/L (ref 98–107)
CO2 SERPL-SCNC: 20 MMOL/L (ref 22–29)
CREAT SERPL-MCNC: 0.91 MG/DL (ref 0.57–1)
CREAT UR-MCNC: 71.3 MG/DL
GLOBULIN SER CALC-MCNC: 3 GM/DL
GLUCOSE SERPL-MCNC: 144 MG/DL (ref 65–99)
HBA1C MFR BLD: 6.85 % (ref 4.8–5.6)
MICROALBUMIN UR-MCNC: 128 UG/ML
POTASSIUM SERPL-SCNC: 4.4 MMOL/L (ref 3.5–5.2)
PROT SERPL-MCNC: 7.5 G/DL (ref 6–8.5)
SODIUM SERPL-SCNC: 140 MMOL/L (ref 136–145)
T4 FREE SERPL-MCNC: 0.85 NG/DL (ref 0.93–1.7)
TSH SERPL DL<=0.005 MIU/L-ACNC: 23.75 MIU/ML (ref 0.27–4.2)

## 2018-11-02 ENCOUNTER — OFFICE VISIT (OUTPATIENT)
Dept: ENDOCRINOLOGY | Age: 38
End: 2018-11-02

## 2018-11-02 VITALS
HEART RATE: 110 BPM | BODY MASS INDEX: 36.75 KG/M2 | WEIGHT: 207.4 LBS | DIASTOLIC BLOOD PRESSURE: 76 MMHG | HEIGHT: 63 IN | SYSTOLIC BLOOD PRESSURE: 188 MMHG

## 2018-11-02 DIAGNOSIS — E78.5 HYPERLIPIDEMIA ASSOCIATED WITH TYPE 2 DIABETES MELLITUS (HCC): ICD-10-CM

## 2018-11-02 DIAGNOSIS — IMO0002 UNCONTROLLED TYPE II DIABETES MELLITUS WITH NEPHROPATHY: ICD-10-CM

## 2018-11-02 DIAGNOSIS — Z79.4 ENCOUNTER FOR LONG-TERM (CURRENT) USE OF INSULIN (HCC): ICD-10-CM

## 2018-11-02 DIAGNOSIS — E11.69 HYPERLIPIDEMIA ASSOCIATED WITH TYPE 2 DIABETES MELLITUS (HCC): ICD-10-CM

## 2018-11-02 DIAGNOSIS — R63.5 ABNORMAL WEIGHT GAIN: ICD-10-CM

## 2018-11-02 DIAGNOSIS — IMO0002 UNCONTROLLED TYPE 2 DIABETES MELLITUS WITH COMPLICATION, WITH LONG-TERM CURRENT USE OF INSULIN: Primary | ICD-10-CM

## 2018-11-02 DIAGNOSIS — E03.9 ACQUIRED HYPOTHYROIDISM: ICD-10-CM

## 2018-11-02 DIAGNOSIS — E16.1 HYPERINSULINISM: ICD-10-CM

## 2018-11-02 PROCEDURE — 99214 OFFICE O/P EST MOD 30 MIN: CPT | Performed by: INTERNAL MEDICINE

## 2018-11-02 NOTE — PROGRESS NOTES
38 y.o.    Patient Care Team:  Christina Mcrae APRN as PCP - General (Family Medicine)  Travis Oropeza MD as Consulting Physician (Endocrinology)  Liz Davidson MD as Consulting Physician (Hematology and Oncology)    Chief Complaint:      F/U TYPE 2 DIABETES, UNCONTROLLED.  HYPOTHYROIDISM.  HERE TO DISCUSS LAB RESULTS  Subjective     HPI   Patient is a 38-year-old white female with a past history of uncontrolled type 2 diabetes mellitus and hypothyroidism came for follow-up    Uncontrolled type 2 diabetes mellitus  Patient is currently taking victoza 1.8 mg daily along with Levemir 40 units at night and Xigduo 2 tablets daily at night and NovoLog 15 units before each meal  Patient is not extremely compliant with NovoLog  Patient's glucometer has very few readings in that in the  range  Hypoglycemia  Patient denied any recent hypoglycemia  Hypothyroidism  Patient is currently taking levothyroxine 150 µg daily.  She reports compliance with the medication  Denies any side effects  Patient denied any symptoms of diabetic peripheral neuropathy or nephropathy or retinopathy  Abnormal weight gain  Patient currently weighs 207 pounds with a BMI of 36.7  She appears to have lost nearly 3 pounds in the past 6 months  She has not been able to lose weight despite exercise and diet  She also reports extreme stress at work  Hyperlipidemia associated with diabetes  Patient is currently on Lopid  She reports compliance with the medication  Denies any side effects.        Interval History    Summary  New-onset diabetes  Patient was apparently admitted to the hospital in April 2015 with diabetic ketoacidosis, pneumonia, pancreatitis and was diagnosed with severe hyperlipidemia, diabetes and was started on Levemir 20 units at bedtime and NovoLog 5 units before each meal and discharge home.. Patient only reports symptoms for a brief period prior to admission including nausea vomiting abdominal pain excessive thirst  etc.  Since discharge patient is noted that her blood sugars are still elevated she increase the insulin NovoLog to 10 units before each meal and believes that currently her blood sugars are mostly less than 200  She had one episode of hypoglycemia at 89 in the early afternoon when she was feeling slightly jittery and nervous  Hypothyroidism  Patient was diagnosed with hypothyroidism at the age of 12 and has been on medication ever since.  Hyperlipidemia  New diagnosis with a triglyceride level of greater than 1000 prior to admission to the hospital. She does recall being told that her triglycerides and cholesterol are always higher for the past 20 years but was never on treatment.  Abnormal weight gain  Patient reports that she andhas gained weight significantly over the past 5-10 years. Currently she weighs 196 pounds with a BMI of 34.7.  Patient appears to be comfortable with Accu-Cheks and insulin regimen but is seeking an alternative regimen if possible. She is unaware of her diabetes status whether she is type I or type II.  She has a strong family history of type 2 diabetes.  In the previous visit patient was advised to try Victoza. She apparently had good response but her insurance would not cover Victoza due to history pancreatitis  So she had to stop this medication and started gaining weight again.       Patient was not able to get SAXENDA approved due to previous history of pancreatitis.  She reported that she lost a few pounds since last visit  She also believes that the new medication ZIGDUO is helping somewhat and losing weight as well.  She continues Levemir 40 units at night  NovoLog 15-17 units usually at dinner  Continues levothyroxine 100 mcg since November 2015.      Patient has leukocytosis and is currently being evaluated by neurology as well as ENT  She has a history pancreatitis April 21, 2015 and has used Victoza 4-5months after that  She wants to restart that medication        The  following portions of the patient's history were reviewed and updated as appropriate: allergies, current medications, past family history, past medical history, past social history, past surgical history and problem list.    Past Medical History:   Diagnosis Date   • Diabetes mellitus (CMS/HCC)    • Fatty liver    • H/O Pancreatitis, acute    • Hyperlipidemia    • Hypothyroidism    • Left ovarian cyst      Family History   Problem Relation Age of Onset   • Diabetes Mother    • Hyperlipidemia Mother    • Hypertension Mother      Social History     Social History   • Marital status:      Spouse name: N/A   • Number of children: N/A   • Years of education: N/A     Occupational History   • Office of       Social History Main Topics   • Smoking status: Current Every Day Smoker     Packs/day: 0.50     Types: Cigarettes   • Smokeless tobacco: Never Used   • Alcohol use No   • Drug use: No   • Sexual activity: Defer     Other Topics Concern   • Not on file     Social History Narrative   • No narrative on file     Allergies   Allergen Reactions   • No Known Drug Allergy        Current Outpatient Prescriptions:   •  baclofen (LIORESAL) 10 MG tablet, Take 1 tablet by mouth 3 (Three) Times a Day., Disp: 30 tablet, Rfl: 1  •  dapagliflozin-metformin HCl ER (XIGDUO XR) 5-1000 MG tablet, Take 1 tablet by mouth 2 (Two) Times a Day., Disp: 60 tablet, Rfl: 5  •  gemfibrozil (LOPID) 600 MG tablet, Take 1 tablet by mouth 2 (Two) Times a Day., Disp: 60 tablet, Rfl: 5  •  glucose blood test strip, 1 each by Other route 4 (four) times a day. Use as instructed, Disp: 400 each, Rfl: 1  •  insulin aspart (novoLOG FLEXPEN) 100 UNIT/ML solution pen-injector sc pen, Inject 15 Units under the skin Daily., Disp: 5 pen, Rfl: 1  •  Insulin Pen Needle 32G X 4 MM misc, 5 TIMES DAILY SC USE, Disp: 200 each, Rfl: 4  •  LEVEMIR FLEXTOUCH 100 UNIT/ML injection, INJECT 50 UNITS UNDER THE SKIN AT NIGHT, Disp: 15 pen, Rfl: 3  •   "levothyroxine (SYNTHROID, LEVOTHROID) 150 MCG tablet, Take 1 tablet by mouth Daily., Disp: 30 tablet, Rfl: 5  •  lidocaine (LIDODERM) 5 %, , Disp: , Rfl:   •  ondansetron ODT (ZOFRAN ODT) 8 MG disintegrating tablet, Take 1 tablet by mouth Every 8 (Eight) Hours As Needed for Nausea or Vomiting., Disp: 21 tablet, Rfl: 0  •  ranitidine (ZANTAC) 150 MG tablet, , Disp: , Rfl:   •  rosuvastatin (CRESTOR) 40 MG tablet, Take 1 tablet by mouth Daily., Disp: 30 tablet, Rfl: 11  •  TRI-SPRINTEC 0.18/0.215/0.25 MG-35 MCG per tablet, TAKE ONE TABLET BY MOUTH DAILY, Disp: 84 tablet, Rfl: 1  •  VICTOZA 18 MG/3ML solution pen-injector injection, DIAL AND INJECT SUBCUTANEOUSLY 1.8MG DAILY, Disp: 9 pen, Rfl: 1        Review of Systems   Constitutional: Negative for chills, fatigue and fever.   Cardiovascular: Negative for chest pain and palpitations.   Gastrointestinal: Negative for abdominal pain, constipation, diarrhea, nausea and vomiting.   Endocrine: Negative for cold intolerance and heat intolerance.   All other systems reviewed and are negative.      Objective       Vitals:    11/02/18 1321   BP: (!) 188/76   Pulse: 110   Weight: 94.1 kg (207 lb 6.4 oz)   Height: 160 cm (63\")     Body mass index is 36.74 kg/m².      Physical Exam   Constitutional: She is oriented to person, place, and time. She appears well-developed and well-nourished.   HENT:   Head: Normocephalic and atraumatic.   Eyes: EOM are normal. Pupils are equal, round, and reactive to light.   Neck: Normal range of motion. Neck supple. No thyromegaly present.   Cardiovascular: Normal rate, regular rhythm, normal heart sounds and intact distal pulses.   Pulmonary/Chest: Effort normal and breath sounds normal.   Abdominal: Soft. Bowel sounds are normal. She exhibits distension. There is no tenderness.   Musculoskeletal: She exhibits no edema.   Neurological: She is alert and oriented to person, place, and time.   Skin: Skin is warm and dry.   Psychiatric: She has a " normal mood and affect. Her behavior is normal.   Nursing note and vitals reviewed.    Results Review:     I reviewed the patient's new clinical results.    Medical records reviewed  Summary:      Results Encounter on 09/28/2018   Component Date Value Ref Range Status   • Glucose 10/23/2018 144* 65 - 99 mg/dL Final   • BUN 10/23/2018 12  6 - 20 mg/dL Final   • Creatinine 10/23/2018 0.91  0.57 - 1.00 mg/dL Final   • eGFR Non  Am 10/23/2018 70  >60 mL/min/1.73 Final   • eGFR African Am 10/23/2018 84  >60 mL/min/1.73 Final   • BUN/Creatinine Ratio 10/23/2018 13.2  7.0 - 25.0 Final   • Sodium 10/23/2018 140  136 - 145 mmol/L Final   • Potassium 10/23/2018 4.4  3.5 - 5.2 mmol/L Final   • Chloride 10/23/2018 101  98 - 107 mmol/L Final   • Total CO2 10/23/2018 20.0* 22.0 - 29.0 mmol/L Final   • Calcium 10/23/2018 9.9  8.6 - 10.5 mg/dL Final   • Total Protein 10/23/2018 7.5  6.0 - 8.5 g/dL Final   • Albumin 10/23/2018 4.50  3.50 - 5.20 g/dL Final   • Globulin 10/23/2018 3.0  gm/dL Final   • A/G Ratio 10/23/2018 1.5  g/dL Final   • Total Bilirubin 10/23/2018 0.2  0.1 - 1.2 mg/dL Final   • Alkaline Phosphatase 10/23/2018 57  39 - 117 U/L Final   • AST (SGOT) 10/23/2018 17  1 - 32 U/L Final   • ALT (SGPT) 10/23/2018 18  1 - 33 U/L Final   • Hemoglobin A1C 10/23/2018 6.85* 4.80 - 5.60 % Final    Comment: Hemoglobin A1C Ranges:  Increased Risk for Diabetes  5.7% to 6.4%  Diabetes                     >= 6.5%  Diabetic Goal                < 7.0%     • Free T4 10/23/2018 0.85* 0.93 - 1.70 ng/dL Final   • TSH 10/23/2018 23.750* 0.270 - 4.200 mIU/mL Final   • Creatinine, Urine 10/23/2018 71.3  Not Estab. mg/dL Final   • Microalbumin, Urine 10/23/2018 128.0  Not Estab. ug/mL Final   • Microalbumin/Creatinine Ratio 10/23/2018 179.5* 0.0 - 30.0 mg/g creat Final    Comment:                      Normal:                0.0 -  30.0                       Albuminuria:          31.0 - 300.0                       Clinical albuminuria:     "   >300.0       Lab Results   Component Value Date    HGBA1C 6.85 (H) 10/23/2018    HGBA1C 6.80 (H) 06/22/2018    HGBA1C 6.40 (H) 03/09/2018     Lab Results   Component Value Date    MICROALBUR 128.0 10/23/2018    CREATININE 0.91 10/23/2018     Imaging Results (most recent)     None                Assessment and Plan:    Cora was seen today for diabetes and hypothyroidism.    Diagnoses and all orders for this visit:    Uncontrolled type 2 diabetes mellitus with complication, with long-term current use of insulin (CMS/[facility])    Acquired hypothyroidism    Uncontrolled type II diabetes mellitus with nephropathy (CMS/[facility])    Abnormal weight gain    Encounter for long-term (current) use of insulin (CMS/[facility])    Hyperinsulinism    Hyperlipidemia associated with type 2 diabetes mellitus (CMS/[facility])        Patient's blood sugars are ranging from   She does very few Accu-Cheks  Her last hemoglobin A1c was 6.8%  Patient still has microalbuminuria    I advised the patient to get the hemoglobin A1c below 6.5%  I also advised her to start losing weight  Prescriptions refilled  Patient's TSH is 23.7  Extremely out of control  I advised her to start brand-name Synthroid  Patient is advised to take the Synthroid 150 µg daily in the morning on an empty stomach    Patient will return to follow-up in 3 months    The total time spent  was more than 25 min of which greater than 15 min of time (greater than 50% of the total time)  was spent face to face with the patient counseling and coordination of care on recommended evaluation and treatment options, instructions for management/treatment and /or follow up and importance of compliance with chosen management or treatment options  Travis Oropeza MD. FACE    11/02/18      EMR Dragon / transcription disclaimer:     \"Dictated utilizing Dragon dictation\".         "

## 2018-11-05 ENCOUNTER — TELEPHONE (OUTPATIENT)
Dept: ENDOCRINOLOGY | Age: 38
End: 2018-11-05

## 2018-11-05 DIAGNOSIS — E03.9 ACQUIRED HYPOTHYROIDISM: ICD-10-CM

## 2018-11-05 RX ORDER — LEVOTHYROXINE SODIUM 150 MCG
150 TABLET ORAL DAILY
Qty: 30 TABLET | Refills: 5 | Status: SHIPPED | OUTPATIENT
Start: 2018-11-05 | End: 2019-05-02 | Stop reason: SDUPTHER

## 2018-11-05 NOTE — TELEPHONE ENCOUNTER
----- Message from Manisha Ramirez MA sent at 11/5/2018 10:23 AM EST -----  Pt was told to call back once she signed up for synthroid direct. She wanted to let you know that she went with the 30 day supply    SCRIPT SENT

## 2018-11-18 PROBLEM — E78.5 HYPERLIPIDEMIA ASSOCIATED WITH TYPE 2 DIABETES MELLITUS (HCC): Status: ACTIVE | Noted: 2018-11-18

## 2018-11-18 PROBLEM — E11.69 HYPERLIPIDEMIA ASSOCIATED WITH TYPE 2 DIABETES MELLITUS: Status: ACTIVE | Noted: 2018-11-18

## 2018-12-11 RX ORDER — INSULIN DETEMIR 100 [IU]/ML
INJECTION, SOLUTION SUBCUTANEOUS
Qty: 15 ML | Refills: 2 | Status: SHIPPED | OUTPATIENT
Start: 2018-12-11 | End: 2020-01-06

## 2018-12-11 RX ORDER — LIRAGLUTIDE 6 MG/ML
INJECTION SUBCUTANEOUS
Qty: 9 PEN | Refills: 1 | Status: SHIPPED | OUTPATIENT
Start: 2018-12-11 | End: 2019-06-17 | Stop reason: SDUPTHER

## 2018-12-14 ENCOUNTER — LAB (OUTPATIENT)
Dept: ENDOCRINOLOGY | Age: 38
End: 2018-12-14

## 2018-12-14 DIAGNOSIS — E03.9 ACQUIRED HYPOTHYROIDISM: ICD-10-CM

## 2018-12-14 DIAGNOSIS — E78.49 OTHER HYPERLIPIDEMIA: ICD-10-CM

## 2018-12-14 DIAGNOSIS — IMO0002 UNCONTROLLED TYPE II DIABETES MELLITUS WITH NEPHROPATHY: ICD-10-CM

## 2018-12-14 DIAGNOSIS — E78.49 OTHER HYPERLIPIDEMIA: Primary | ICD-10-CM

## 2018-12-15 LAB
ALBUMIN SERPL-MCNC: 4.2 G/DL (ref 3.5–5.2)
ALBUMIN/GLOB SERPL: 1.4 G/DL
ALP SERPL-CCNC: 55 U/L (ref 39–117)
ALT SERPL-CCNC: 15 U/L (ref 1–33)
AST SERPL-CCNC: 13 U/L (ref 1–32)
BILIRUB SERPL-MCNC: 0.3 MG/DL (ref 0.1–1.2)
BUN SERPL-MCNC: 20 MG/DL (ref 6–20)
BUN/CREAT SERPL: 15.2 (ref 7–25)
CALCIUM SERPL-MCNC: 9.7 MG/DL (ref 8.6–10.5)
CHLORIDE SERPL-SCNC: 100 MMOL/L (ref 98–107)
CO2 SERPL-SCNC: 22.4 MMOL/L (ref 22–29)
CREAT SERPL-MCNC: 1.32 MG/DL (ref 0.57–1)
GLOBULIN SER CALC-MCNC: 3 GM/DL
GLUCOSE SERPL-MCNC: 135 MG/DL (ref 65–99)
HBA1C MFR BLD: 6.7 % (ref 4.8–5.6)
POTASSIUM SERPL-SCNC: 4 MMOL/L (ref 3.5–5.2)
PROT SERPL-MCNC: 7.2 G/DL (ref 6–8.5)
SODIUM SERPL-SCNC: 139 MMOL/L (ref 136–145)
T4 FREE SERPL-MCNC: 1.63 NG/DL (ref 0.93–1.7)
TSH SERPL DL<=0.005 MIU/L-ACNC: 2.19 MIU/ML (ref 0.27–4.2)

## 2018-12-17 ENCOUNTER — OFFICE VISIT (OUTPATIENT)
Dept: ENDOCRINOLOGY | Age: 38
End: 2018-12-17

## 2018-12-17 VITALS
HEIGHT: 63 IN | DIASTOLIC BLOOD PRESSURE: 78 MMHG | SYSTOLIC BLOOD PRESSURE: 118 MMHG | WEIGHT: 205.8 LBS | BODY MASS INDEX: 36.46 KG/M2 | HEART RATE: 91 BPM

## 2018-12-17 DIAGNOSIS — E11.69 HYPERLIPIDEMIA ASSOCIATED WITH TYPE 2 DIABETES MELLITUS (HCC): ICD-10-CM

## 2018-12-17 DIAGNOSIS — Z79.4 ENCOUNTER FOR LONG-TERM (CURRENT) USE OF INSULIN (HCC): ICD-10-CM

## 2018-12-17 DIAGNOSIS — IMO0002 UNCONTROLLED TYPE II DIABETES MELLITUS WITH NEPHROPATHY: ICD-10-CM

## 2018-12-17 DIAGNOSIS — R63.5 ABNORMAL WEIGHT GAIN: ICD-10-CM

## 2018-12-17 DIAGNOSIS — IMO0002 UNCONTROLLED TYPE 2 DIABETES MELLITUS WITH COMPLICATION, WITH LONG-TERM CURRENT USE OF INSULIN: Primary | ICD-10-CM

## 2018-12-17 DIAGNOSIS — E78.5 HYPERLIPIDEMIA ASSOCIATED WITH TYPE 2 DIABETES MELLITUS (HCC): ICD-10-CM

## 2018-12-17 DIAGNOSIS — E03.9 ACQUIRED HYPOTHYROIDISM: ICD-10-CM

## 2018-12-17 DIAGNOSIS — E16.1 HYPERINSULINISM: ICD-10-CM

## 2018-12-17 PROCEDURE — 99214 OFFICE O/P EST MOD 30 MIN: CPT | Performed by: INTERNAL MEDICINE

## 2018-12-17 RX ORDER — HEPATITIS A VACCINE 1440 [IU]/ML
INJECTION, SUSPENSION INTRAMUSCULAR
COMMUNITY
Start: 2018-11-06 | End: 2019-05-02

## 2018-12-17 NOTE — PROGRESS NOTES
38 y.o.    Patient Care Team:  Christina Mcrae APRN as PCP - General (Family Medicine)  Travis Oropeza MD as Consulting Physician (Endocrinology)  Liz Davidson MD as Consulting Physician (Hematology and Oncology)    Chief Complaint:      F/U TYPE 2 DIABETES, UNCONTROLLED.  HYPOTHYROIDISM.  HERE TO DISCUSS LAB RESULTS     Subjective     HPI   Patient is a 38-year-old white female with a history of uncontrolled type 2 diabetes mellitus and hypothyroidism came for follow-up    Uncontrolled type 2 diabetes mellitus  Patient is currently taking victoza 1.8 mg daily along with Levemir 40 units at night and NovoLog 10-15 units before each meal and Xigduo 2 tablets daily at night  Patient reports compliance with her medication regimen  She reports that her blood sugars are mostly in the  range  Hypoglycemia  Patient has occasional episodes of hypoglycemia  Hypothyroidism  Patient is currently taking levothyroxine 150 µg daily.  Since the dose increase last visit she feels much better and her energy levels have improved significantly  She reports compliance with the medication  Denies any side effects  Patient denied any symptoms or knowledge of diabetic peripheral neuropathy or retinopathy or nephropathy  Abnormal weight gain  Patient currently weighs 205 pounds with a BMI of 36.5  She is trying to lose weight  She is trying to control her diet and exercise almost daily  She also reports stress at home  Hyperlipidemia associated with diabetes  In patient is currently on Lopid  She reports compliance with the medication  Denies any side effects      Interval History    Summary  New-onset diabetes  Patient was apparently admitted to the hospital in April 2015 with diabetic ketoacidosis, pneumonia, pancreatitis and was diagnosed with severe hyperlipidemia, diabetes and was started on Levemir 20 units at bedtime and NovoLog 5 units before each meal and discharge home.. Patient only reports symptoms for a  brief period prior to admission including nausea vomiting abdominal pain excessive thirst etc.  Since discharge patient is noted that her blood sugars are still elevated she increase the insulin NovoLog to 10 units before each meal and believes that currently her blood sugars are mostly less than 200  She had one episode of hypoglycemia at 89 in the early afternoon when she was feeling slightly jittery and nervous  Hypothyroidism  Patient was diagnosed with hypothyroidism at the age of 12 and has been on medication ever since.  Hyperlipidemia  New diagnosis with a triglyceride level of greater than 1000 prior to admission to the hospital. She does recall being told that her triglycerides and cholesterol are always higher for the past 20 years but was never on treatment.  Abnormal weight gain  Patient reports that she andhas gained weight significantly over the past 5-10 years. Currently she weighs 196 pounds with a BMI of 34.7.  Patient appears to be comfortable with Accu-Cheks and insulin regimen but is seeking an alternative regimen if possible. She is unaware of her diabetes status whether she is type I or type II.  She has a strong family history of type 2 diabetes.  In the previous visit patient was advised to try Victoza. She apparently had good response but her insurance would not cover Victoza due to history pancreatitis  So she had to stop this medication and started gaining weight again.       Patient was not able to get SAXENDA approved due to previous history of pancreatitis.  She reported that she lost a few pounds since last visit  She also believes that the new medication ZIGDUO is helping somewhat and losing weight as well.  She continues Levemir 40 units at night  NovoLog 15-17 units usually at dinner  Continues levothyroxine 100 mcg since November 2015.      Patient has leukocytosis and is currently being evaluated by neurology as well as ENT  She has a history pancreatitis April 21, 2015 and has  used Victoza 4-5months after that  She wants to restart that medication           The following portions of the patient's history were reviewed and updated as appropriate: allergies, current medications, past family history, past medical history, past social history, past surgical history and problem list.    Past Medical History:   Diagnosis Date   • Diabetes mellitus (CMS/HCC)    • Fatty liver    • H/O Pancreatitis, acute    • Hyperlipidemia    • Hypothyroidism    • Left ovarian cyst      Family History   Problem Relation Age of Onset   • Diabetes Mother    • Hyperlipidemia Mother    • Hypertension Mother      Social History     Socioeconomic History   • Marital status:      Spouse name: Not on file   • Number of children: Not on file   • Years of education: Not on file   • Highest education level: Not on file   Social Needs   • Financial resource strain: Not on file   • Food insecurity - worry: Not on file   • Food insecurity - inability: Not on file   • Transportation needs - medical: Not on file   • Transportation needs - non-medical: Not on file   Occupational History   • Occupation: Office of    Tobacco Use   • Smoking status: Current Every Day Smoker     Packs/day: 0.50     Types: Cigarettes   • Smokeless tobacco: Never Used   Substance and Sexual Activity   • Alcohol use: No   • Drug use: No   • Sexual activity: Defer   Other Topics Concern   • Not on file   Social History Narrative   • Not on file     Allergies   Allergen Reactions   • No Known Drug Allergy        Current Outpatient Medications:   •  dapagliflozin-metformin HCl ER (XIGDUO XR) 5-1000 MG tablet, Take 1 tablet by mouth 2 (Two) Times a Day., Disp: 60 tablet, Rfl: 5  •  glucose blood test strip, 1 each by Other route 4 (four) times a day. Use as instructed, Disp: 400 each, Rfl: 1  •  insulin aspart (novoLOG FLEXPEN) 100 UNIT/ML solution pen-injector sc pen, Inject 15 Units under the skin Daily., Disp: 5 pen, Rfl: 1  •   "Insulin Pen Needle (BD PEN NEEDLE YAZMIN U/F) 32G X 4 MM misc, USE FIVE TIMES DAILY WITH INSULIN, Disp: 200 each, Rfl: 3  •  LEVEMIR FLEXTOUCH 100 UNIT/ML injection, INJECT 50 UNITS UNDER THE SKIN AT NIGHT, Disp: 15 mL, Rfl: 2  •  levothyroxine (SYNTHROID, LEVOTHROID) 150 MCG tablet, Take 1 tablet by mouth Daily., Disp: 30 tablet, Rfl: 5  •  lidocaine (LIDODERM) 5 %, , Disp: , Rfl:   •  ondansetron ODT (ZOFRAN ODT) 8 MG disintegrating tablet, Take 1 tablet by mouth Every 8 (Eight) Hours As Needed for Nausea or Vomiting., Disp: 21 tablet, Rfl: 0  •  ranitidine (ZANTAC) 150 MG tablet, , Disp: , Rfl:   •  rosuvastatin (CRESTOR) 40 MG tablet, Take 1 tablet by mouth Daily., Disp: 30 tablet, Rfl: 11  •  SYNTHROID 150 MCG tablet, Take 1 tablet by mouth Daily., Disp: 30 tablet, Rfl: 5  •  TRI-SPRINTEC 0.18/0.215/0.25 MG-35 MCG per tablet, TAKE ONE TABLET BY MOUTH DAILY, Disp: 84 tablet, Rfl: 1  •  VICTOZA 18 MG/3ML solution pen-injector injection, DIAL AND INJECT SUBCUTANEOUSLY 1.8MG DAILY, Disp: 9 pen, Rfl: 1        Review of Systems   Constitutional: Negative for chills, fatigue and fever.   Cardiovascular: Negative for chest pain and palpitations.   Gastrointestinal: Negative for abdominal pain, constipation, diarrhea, nausea and vomiting.   Endocrine: Negative for cold intolerance and heat intolerance.   All other systems reviewed and are negative.      Objective       Vitals:    12/17/18 0935   BP: 118/78   Pulse: 91   Weight: 93.4 kg (205 lb 12.8 oz)   Height: 160 cm (63\")     Body mass index is 36.46 kg/m².      Physical Exam   Constitutional: She is oriented to person, place, and time. She appears well-developed and well-nourished.   HENT:   Head: Normocephalic and atraumatic.   Eyes: EOM are normal. Pupils are equal, round, and reactive to light.   Neck: Normal range of motion. Neck supple. No thyromegaly present.   Cardiovascular: Normal rate, regular rhythm, normal heart sounds and intact distal pulses. "   Pulmonary/Chest: Effort normal and breath sounds normal.   Abdominal: Soft. Bowel sounds are normal. She exhibits distension. There is no tenderness.   Musculoskeletal: She exhibits no edema.   Neurological: She is alert and oriented to person, place, and time.   Skin: Skin is warm and dry.   Psychiatric: She has a normal mood and affect. Her behavior is normal.   Nursing note and vitals reviewed.    Results Review:     I reviewed the patient's new clinical results.    Medical records reviewed  Summary:      Lab on 12/14/2018   Component Date Value Ref Range Status   • Hemoglobin A1C 12/14/2018 6.70* 4.80 - 5.60 % Final    Comment: Hemoglobin A1C Ranges:  Increased Risk for Diabetes  5.7% to 6.4%  Diabetes                     >= 6.5%  Diabetic Goal                < 7.0%     • Free T4 12/14/2018 1.63  0.93 - 1.70 ng/dL Final   • TSH 12/14/2018 2.190  0.270 - 4.200 mIU/mL Final   • Glucose 12/14/2018 135* 65 - 99 mg/dL Final   • BUN 12/14/2018 20  6 - 20 mg/dL Final   • Creatinine 12/14/2018 1.32* 0.57 - 1.00 mg/dL Final   • eGFR Non African Am 12/14/2018 45* >60 mL/min/1.73 Final   • eGFR African Am 12/14/2018 55* >60 mL/min/1.73 Final   • BUN/Creatinine Ratio 12/14/2018 15.2  7.0 - 25.0 Final   • Sodium 12/14/2018 139  136 - 145 mmol/L Final   • Potassium 12/14/2018 4.0  3.5 - 5.2 mmol/L Final   • Chloride 12/14/2018 100  98 - 107 mmol/L Final   • Total CO2 12/14/2018 22.4  22.0 - 29.0 mmol/L Final   • Calcium 12/14/2018 9.7  8.6 - 10.5 mg/dL Final   • Total Protein 12/14/2018 7.2  6.0 - 8.5 g/dL Final   • Albumin 12/14/2018 4.20  3.50 - 5.20 g/dL Final   • Globulin 12/14/2018 3.0  gm/dL Final   • A/G Ratio 12/14/2018 1.4  g/dL Final   • Total Bilirubin 12/14/2018 0.3  0.1 - 1.2 mg/dL Final   • Alkaline Phosphatase 12/14/2018 55  39 - 117 U/L Final   • AST (SGOT) 12/14/2018 13  1 - 32 U/L Final   • ALT (SGPT) 12/14/2018 15  1 - 33 U/L Final     Lab Results   Component Value Date    HGBA1C 6.70 (H) 12/14/2018     "HGBA1C 6.85 (H) 10/23/2018    HGBA1C 6.80 (H) 06/22/2018     Lab Results   Component Value Date    MICROALBUR 128.0 10/23/2018    CREATININE 1.32 (H) 12/14/2018     Imaging Results (most recent)     None          Assessment and Plan:    Cora was seen today for diabetes and hypothyroidism.    Diagnoses and all orders for this visit:    Uncontrolled type 2 diabetes mellitus with complication, with long-term current use of insulin (CMS/[facility])    Uncontrolled type II diabetes mellitus with nephropathy (CMS/[facility])    Acquired hypothyroidism    Encounter for long-term (current) use of insulin (CMS/[facility])    Abnormal weight gain    Hyperinsulinism    Hyperlipidemia associated with type 2 diabetes mellitus (CMS/[facility])    Other orders  -     glucose blood (ACCU-CHEK SMARTVIEW) test strip; Check 4 times daily Use as instructed        Patient's blood sugars have improved significantly  Majority are in the  range  Her recent hemoglobin A1c 6.7% steadily improving  Patient still has microalbuminuria    TSH is improved significantly now it is 2.3 and a free T4 is 1.6  Patient is currently on Synthroid 150 µg brand-name from [facility] pharmacy     I advised the patient to focus on weight loss at least 2 pounds a month for the next 6 months  Prescriptions refilled  Patient will return to follow-up in 3 months.    The total time spent  was more than 25 min of which greater than 15 min of time (greater than 50% of the total time)  was spent face to face with the patient counseling and coordination of care on recommended evaluation and treatment options, instructions for management/treatment and /or follow up and importance of compliance with chosen management or treatment options    Travis Oropeza MD. FACE    12/17/18      EMR Dragon / transcription disclaimer:     \"Dictated utilizing Dragon dictation\".         "

## 2019-01-21 RX ORDER — NORGESTIMATE AND ETHINYL ESTRADIOL 7DAYSX3 28
KIT ORAL
Qty: 84 TABLET | Refills: 1 | Status: SHIPPED | OUTPATIENT
Start: 2019-01-21 | End: 2019-07-14 | Stop reason: SDUPTHER

## 2019-02-25 RX ORDER — DAPAGLIFLOZIN AND METFORMIN HYDROCHLORIDE 5; 1000 MG/1; MG/1
TABLET, FILM COATED, EXTENDED RELEASE ORAL
Qty: 60 TABLET | Refills: 0 | Status: SHIPPED | OUTPATIENT
Start: 2019-02-25 | End: 2019-03-27 | Stop reason: SDUPTHER

## 2019-03-15 ENCOUNTER — RESULTS ENCOUNTER (OUTPATIENT)
Dept: ENDOCRINOLOGY | Age: 39
End: 2019-03-15

## 2019-03-15 DIAGNOSIS — IMO0002 UNCONTROLLED TYPE 2 DIABETES MELLITUS WITH COMPLICATION, WITH LONG-TERM CURRENT USE OF INSULIN: ICD-10-CM

## 2019-03-15 DIAGNOSIS — IMO0002 UNCONTROLLED TYPE 2 DIABETES MELLITUS WITH COMPLICATION, WITH LONG-TERM CURRENT USE OF INSULIN: Primary | ICD-10-CM

## 2019-03-15 DIAGNOSIS — E03.9 ACQUIRED HYPOTHYROIDISM: ICD-10-CM

## 2019-03-27 RX ORDER — DAPAGLIFLOZIN AND METFORMIN HYDROCHLORIDE 5; 1000 MG/1; MG/1
TABLET, FILM COATED, EXTENDED RELEASE ORAL
Qty: 60 TABLET | Refills: 0 | Status: SHIPPED | OUTPATIENT
Start: 2019-03-27 | End: 2019-04-24 | Stop reason: SDUPTHER

## 2019-04-24 RX ORDER — DAPAGLIFLOZIN AND METFORMIN HYDROCHLORIDE 5; 1000 MG/1; MG/1
TABLET, FILM COATED, EXTENDED RELEASE ORAL
Qty: 60 TABLET | Refills: 0 | Status: SHIPPED | OUTPATIENT
Start: 2019-04-24 | End: 2019-05-21 | Stop reason: SDUPTHER

## 2019-04-25 LAB
ALBUMIN SERPL-MCNC: 4.5 G/DL (ref 3.5–5.2)
ALBUMIN/GLOB SERPL: 1.3 G/DL
ALP SERPL-CCNC: 50 U/L (ref 39–117)
ALT SERPL-CCNC: 66 U/L (ref 1–33)
AST SERPL-CCNC: 50 U/L (ref 1–32)
BILIRUB SERPL-MCNC: 0.3 MG/DL (ref 0.2–1.2)
BUN SERPL-MCNC: 13 MG/DL (ref 6–20)
BUN/CREAT SERPL: 19.1 (ref 7–25)
CALCIUM SERPL-MCNC: 10 MG/DL (ref 8.6–10.5)
CHLORIDE SERPL-SCNC: 97 MMOL/L (ref 98–107)
CO2 SERPL-SCNC: 21.2 MMOL/L (ref 22–29)
CREAT SERPL-MCNC: 0.68 MG/DL (ref 0.57–1)
GLOBULIN SER CALC-MCNC: 3.4 GM/DL
GLUCOSE SERPL-MCNC: 101 MG/DL (ref 65–99)
HBA1C MFR BLD: 7.1 % (ref 4.8–5.6)
POTASSIUM SERPL-SCNC: 4.3 MMOL/L (ref 3.5–5.2)
PROT SERPL-MCNC: 7.9 G/DL (ref 6–8.5)
SODIUM SERPL-SCNC: 136 MMOL/L (ref 136–145)
T4 FREE SERPL-MCNC: 1.07 NG/DL (ref 0.93–1.7)
TSH SERPL DL<=0.005 MIU/L-ACNC: 15.6 MIU/ML (ref 0.27–4.2)
UNABLE TO VOID: NORMAL

## 2019-05-02 ENCOUNTER — OFFICE VISIT (OUTPATIENT)
Dept: ENDOCRINOLOGY | Age: 39
End: 2019-05-02

## 2019-05-02 VITALS
BODY MASS INDEX: 37.74 KG/M2 | DIASTOLIC BLOOD PRESSURE: 62 MMHG | HEART RATE: 100 BPM | HEIGHT: 63 IN | WEIGHT: 213 LBS | SYSTOLIC BLOOD PRESSURE: 108 MMHG

## 2019-05-02 DIAGNOSIS — R63.5 ABNORMAL WEIGHT GAIN: ICD-10-CM

## 2019-05-02 DIAGNOSIS — E16.1 HYPERINSULINISM: ICD-10-CM

## 2019-05-02 DIAGNOSIS — IMO0002 UNCONTROLLED TYPE II DIABETES MELLITUS WITH NEPHROPATHY: ICD-10-CM

## 2019-05-02 DIAGNOSIS — E78.5 HYPERLIPIDEMIA ASSOCIATED WITH TYPE 2 DIABETES MELLITUS (HCC): ICD-10-CM

## 2019-05-02 DIAGNOSIS — Z79.4 ENCOUNTER FOR LONG-TERM (CURRENT) USE OF INSULIN (HCC): ICD-10-CM

## 2019-05-02 DIAGNOSIS — IMO0002 UNCONTROLLED TYPE 2 DIABETES MELLITUS WITH COMPLICATION, WITH LONG-TERM CURRENT USE OF INSULIN: Primary | ICD-10-CM

## 2019-05-02 DIAGNOSIS — E11.69 HYPERLIPIDEMIA ASSOCIATED WITH TYPE 2 DIABETES MELLITUS (HCC): ICD-10-CM

## 2019-05-02 DIAGNOSIS — E03.9 ACQUIRED HYPOTHYROIDISM: ICD-10-CM

## 2019-05-02 PROCEDURE — 99214 OFFICE O/P EST MOD 30 MIN: CPT | Performed by: INTERNAL MEDICINE

## 2019-05-02 RX ORDER — LEVOTHYROXINE SODIUM 150 MCG
150 TABLET ORAL DAILY
Qty: 30 TABLET | Refills: 5 | Status: SHIPPED | OUTPATIENT
Start: 2019-05-02 | End: 2019-10-07 | Stop reason: SDUPTHER

## 2019-05-02 RX ORDER — RANITIDINE 150 MG/1
150 TABLET ORAL NIGHTLY
Qty: 30 TABLET | Refills: 5 | Status: SHIPPED | OUTPATIENT
Start: 2019-05-02 | End: 2019-11-04 | Stop reason: SDUPTHER

## 2019-05-02 NOTE — PROGRESS NOTES
39 y.o.    Patient Care Team:  Christina Mcrae APRN as PCP - General (Family Medicine)  Travis Oropeza MD as Consulting Physician (Endocrinology)  Liz Davidson MD as Consulting Physician (Hematology and Oncology)    Chief Complaint:      F/U TYPE 2 DIABETES, UNCONTROLLED.  HYPOTHYROIDISM.  HERE TO DISCUSS LAB RESULTS  Subjective     HPI   Patient is a 39-year-old white female with a past history of uncontrolled type 2 diabetes mellitus and hypothyroidism came for follow-up    Test  Patient is currently taking Levemir 40 units at night along with Victoza 1.2 mg daily and NovoLog as needed  She also takes zigduo 2 tablets daily at night  She has not been checking her Accu-Cheks frequently  Hypoglycemia  Patient denies any recent episodes of hypoglycemia  Hypothyroidism  Patient is currently taking levothyroxine 150 mcg daily.  She reports compliance with medication and feels better with the dose  She denies any side effects  She denies any symptoms suggestive of hyper or hypothyroidism  Patient denies any knowledge of diabetic peripheral neuropathy or retinopathy or nephropathy  Abnormal weight gain  Patient currently weighs 213 pounds with a BMI of 37  Since last visit  Reports that she controls her diet and is exercising almost daily  Reports significant stress at work and home  Hyperlipidemia associated with diabetes  Patient is currently taking Lopid regularly  She denies any side effects        Interval History    Summary  New-onset diabetes  Patient was apparently admitted to the hospital in April 2015 with diabetic ketoacidosis, pneumonia, pancreatitis and was diagnosed with severe hyperlipidemia, diabetes and was started on Levemir 20 units at bedtime and NovoLog 5 units before each meal and discharge home.. Patient only reports symptoms for a brief period prior to admission including nausea vomiting abdominal pain excessive thirst etc.  Since discharge patient is noted that her blood sugars  are still elevated she increase the insulin NovoLog to 10 units before each meal and believes that currently her blood sugars are mostly less than 200  She had one episode of hypoglycemia at 89 in the early afternoon when she was feeling slightly jittery and nervous  Hypothyroidism  Patient was diagnosed with hypothyroidism at the age of 12 and has been on medication ever since.  Hyperlipidemia  New diagnosis with a triglyceride level of greater than 1000 prior to admission to the hospital. She does recall being told that her triglycerides and cholesterol are always higher for the past 20 years but was never on treatment.  Abnormal weight gain  Patient reports that she andhas gained weight significantly over the past 5-10 years. Currently she weighs 196 pounds with a BMI of 34.7.  Patient appears to be comfortable with Accu-Cheks and insulin regimen but is seeking an alternative regimen if possible. She is unaware of her diabetes status whether she is type I or type II.  She has a strong family history of type 2 diabetes.  In the previous visit patient was advised to try Victoza. She apparently had good response but her insurance would not cover Victoza due to history pancreatitis  So she had to stop this medication and started gaining weight again.       Patient was not able to get SAXENDA approved due to previous history of pancreatitis.  She reported that she lost a few pounds since last visit  She also believes that the new medication ZIGDUO is helping somewhat and losing weight as well.  She continues Levemir 40 units at night  NovoLog 15-17 units usually at dinner  Continues levothyroxine 100 mcg since November 2015.      Patient has leukocytosis and is currently being evaluated by neurology as well as ENT  She has a history pancreatitis April 21, 2015 and has used Victoza 4-5months after that  She wants to restart that medication           The following portions of the patient's history were reviewed and  updated as appropriate: allergies, current medications, past family history, past medical history, past social history, past surgical history and problem list.    Past Medical History:   Diagnosis Date   • Diabetes mellitus (CMS/HCC)    • Fatty liver    • H/O Pancreatitis, acute    • Hyperlipidemia    • Hypothyroidism    • Left ovarian cyst      Family History   Problem Relation Age of Onset   • Diabetes Mother    • Hyperlipidemia Mother    • Hypertension Mother      Social History     Socioeconomic History   • Marital status:      Spouse name: Not on file   • Number of children: Not on file   • Years of education: Not on file   • Highest education level: Not on file   Occupational History   • Occupation: Office of    Tobacco Use   • Smoking status: Current Every Day Smoker     Packs/day: 0.50     Types: Cigarettes   • Smokeless tobacco: Never Used   Substance and Sexual Activity   • Alcohol use: No   • Drug use: No   • Sexual activity: Defer     Allergies   Allergen Reactions   • No Known Drug Allergy        Current Outpatient Medications:   •  dapagliflozin-metformin HCl ER (XIGDUO XR) 5-1000 MG tablet, Take 1 tablet by mouth 2 (Two) Times a Day., Disp: 60 tablet, Rfl: 5  •  glucose blood (ACCU-CHEK SMARTVIEW) test strip, Check 4 times daily Use as instructed, Disp: 100 each, Rfl: 12  •  HAVRIX 1440 EL U/ML vaccine, , Disp: , Rfl:   •  insulin aspart (novoLOG FLEXPEN) 100 UNIT/ML solution pen-injector sc pen, Inject 15 Units under the skin Daily., Disp: 5 pen, Rfl: 1  •  Insulin Pen Needle (BD PEN NEEDLE YAZMIN U/F) 32G X 4 MM misc, USE FIVE TIMES DAILY WITH INSULIN, Disp: 200 each, Rfl: 3  •  LEVEMIR FLEXTOUCH 100 UNIT/ML injection, INJECT 50 UNITS UNDER THE SKIN AT NIGHT, Disp: 15 mL, Rfl: 2  •  lidocaine (LIDODERM) 5 %, , Disp: , Rfl:   •  ondansetron ODT (ZOFRAN ODT) 8 MG disintegrating tablet, Take 1 tablet by mouth Every 8 (Eight) Hours As Needed for Nausea or Vomiting., Disp: 21  "tablet, Rfl: 0  •  ranitidine (ZANTAC) 150 MG tablet, , Disp: , Rfl:   •  rosuvastatin (CRESTOR) 40 MG tablet, Take 1 tablet by mouth Daily., Disp: 30 tablet, Rfl: 11  •  SYNTHROID 150 MCG tablet, Take 1 tablet by mouth Daily., Disp: 30 tablet, Rfl: 5  •  TRI-SPRINTEC 0.18/0.215/0.25 MG-35 MCG per tablet, TAKE ONE TABLET BY MOUTH DAILY, Disp: 84 tablet, Rfl: 1  •  VICTOZA 18 MG/3ML solution pen-injector injection, DIAL AND INJECT SUBCUTANEOUSLY 1.8MG DAILY, Disp: 9 pen, Rfl: 1  •  XIGDUO XR 5-1000 MG tablet, TAKE ONE TABLET BY MOUTH TWICE A DAY, Disp: 60 tablet, Rfl: 0        Review of Systems   Constitutional: Negative for chills, fatigue and fever.   Cardiovascular: Negative for chest pain and palpitations.   Gastrointestinal: Negative for abdominal pain, constipation, diarrhea, nausea and vomiting.   Endocrine: Negative for cold intolerance and heat intolerance.   All other systems reviewed and are negative.      Objective       Vitals:    05/02/19 1301   BP: 108/62   Pulse: 100   Weight: 96.6 kg (213 lb)   Height: 160 cm (63\")     Body mass index is 37.73 kg/m².      Physical Exam   Constitutional: She is oriented to person, place, and time. She appears well-developed and well-nourished.   HENT:   Head: Normocephalic and atraumatic.   Eyes: EOM are normal. Pupils are equal, round, and reactive to light.   Neck: Normal range of motion. Neck supple. No thyromegaly present.   Cardiovascular: Normal rate, regular rhythm, normal heart sounds and intact distal pulses.   Pulmonary/Chest: Effort normal and breath sounds normal.   Abdominal: Soft. Bowel sounds are normal. She exhibits distension. There is no tenderness.   Musculoskeletal: She exhibits no edema.   Neurological: She is alert and oriented to person, place, and time.   Skin: Skin is warm and dry.   Psychiatric: She has a normal mood and affect. Her behavior is normal.   Nursing note and vitals reviewed.    Results Review:     I reviewed the patient's new " clinical results.    Medical records reviewed  Summary:      Results Encounter on 03/15/2019   Component Date Value Ref Range Status   • Glucose 04/25/2019 101* 65 - 99 mg/dL Final   • BUN 04/25/2019 13  6 - 20 mg/dL Final   • Creatinine 04/25/2019 0.68  0.57 - 1.00 mg/dL Final   • eGFR Non African Am 04/25/2019 97  >60 mL/min/1.73 Final   • eGFR African Am 04/25/2019 117  >60 mL/min/1.73 Final   • BUN/Creatinine Ratio 04/25/2019 19.1  7.0 - 25.0 Final   • Sodium 04/25/2019 136  136 - 145 mmol/L Final   • Potassium 04/25/2019 4.3  3.5 - 5.2 mmol/L Final   • Chloride 04/25/2019 97* 98 - 107 mmol/L Final   • Total CO2 04/25/2019 21.2* 22.0 - 29.0 mmol/L Final   • Calcium 04/25/2019 10.0  8.6 - 10.5 mg/dL Final   • Total Protein 04/25/2019 7.9  6.0 - 8.5 g/dL Final   • Albumin 04/25/2019 4.50  3.50 - 5.20 g/dL Final   • Globulin 04/25/2019 3.4  gm/dL Final   • A/G Ratio 04/25/2019 1.3  g/dL Final   • Total Bilirubin 04/25/2019 0.3  0.2 - 1.2 mg/dL Final   • Alkaline Phosphatase 04/25/2019 50  39 - 117 U/L Final   • AST (SGOT) 04/25/2019 50* 1 - 32 U/L Final   • ALT (SGPT) 04/25/2019 66* 1 - 33 U/L Final   • Hemoglobin A1C 04/25/2019 7.10* 4.80 - 5.60 % Final    Comment: Hemoglobin A1C Ranges:  Increased Risk for Diabetes  5.7% to 6.4%  Diabetes                     >= 6.5%  Diabetic Goal                < 7.0%     • Free T4 04/25/2019 1.07  0.93 - 1.70 ng/dL Final   • TSH 04/25/2019 15.600* 0.270 - 4.200 mIU/mL Final   • Unable to Void 04/25/2019 Comment   Final    Comment: The patient was not able to render a urine sample and has been  instructed to return for a urine collection at their earliest  convenience.  The urine testing that you have requested has  been deleted from this report.  When the patient returns and  provides a urine specimen, the urine testing will be performed  and separately reported.       Lab Results   Component Value Date    HGBA1C 7.10 (H) 04/25/2019    HGBA1C 6.70 (H) 12/14/2018    HGBA1C 6.85  (H) 10/23/2018     Lab Results   Component Value Date    MICROALBUR 128.0 10/23/2018    CREATININE 0.68 04/25/2019     Imaging Results (most recent)     None                Assessment and Plan:    Cora was seen today for diabetes and hypothyroidism.    Diagnoses and all orders for this visit:    Uncontrolled type 2 diabetes mellitus with complication, with long-term current use of insulin (CMS/[facility])    Uncontrolled type II diabetes mellitus with nephropathy (CMS/[facility])    Acquired hypothyroidism    Encounter for long-term (current) use of insulin (CMS/[facility])    Abnormal weight gain    Hyperinsulinism    Hyperlipidemia associated with type 2 diabetes mellitus (CMS/[facility])    Other orders  -     SYNTHROID 150 MCG tablet; Take 1 tablet by mouth Daily.  -     insulin aspart (novoLOG FLEXPEN) 100 UNIT/ML solution pen-injector sc pen; Inject 15 Units under the skin into the appropriate area as directed Daily.  -     raNITIdine (ZANTAC) 150 MG tablet; Take 1 tablet by mouth Every Night.        Patient reports that she is currently taking Levemir 40 units at night and Victoza 1.2 mg daily  She is taking NovoLog as needed only if she is eating a larger meal which may be once or twice a week  She is also taking Xigduo twice daily    Glucose log reviewed  Patient is only checking blood sugars in the morning  Majority are in the  range  Her hemoglobin A1c is going up currently 7.1%    TSH is also elevated at 14  Patient reports occasional noncompliance only  She is getting brand-name Synthroid 150 mcg    Patient obviously is not doing well  I advised her strict compliance with the Synthroid  She cannot miss even a single day administration in the morning  I also recommended freestyle CGM professional placement today  Patient return to follow-up in 3 weeks for review    The total time spent  was more than 25 min of which greater than 15 min of time (greater than 50% of the total time)  was spent face to face with the patient  "counseling and coordination of care on recommended evaluation and treatment options, instructions for management/treatment and /or follow up and importance of compliance with chosen management or treatment options  Travis Oropeza MD. FACE    05/02/19      EMR Dragon / transcription disclaimer:     \"Dictated utilizing Dragon dictation\".         "

## 2019-05-21 RX ORDER — DAPAGLIFLOZIN AND METFORMIN HYDROCHLORIDE 5; 1000 MG/1; MG/1
TABLET, FILM COATED, EXTENDED RELEASE ORAL
Qty: 60 TABLET | Refills: 0 | Status: SHIPPED | OUTPATIENT
Start: 2019-05-21 | End: 2019-06-29 | Stop reason: SDUPTHER

## 2019-06-17 RX ORDER — LIRAGLUTIDE 6 MG/ML
INJECTION SUBCUTANEOUS
Qty: 9 ML | Refills: 1 | Status: SHIPPED | OUTPATIENT
Start: 2019-06-17 | End: 2019-09-05 | Stop reason: SDUPTHER

## 2019-07-01 RX ORDER — DAPAGLIFLOZIN AND METFORMIN HYDROCHLORIDE 5; 1000 MG/1; MG/1
TABLET, FILM COATED, EXTENDED RELEASE ORAL
Qty: 60 TABLET | Refills: 0 | Status: SHIPPED | OUTPATIENT
Start: 2019-07-01 | End: 2019-07-27 | Stop reason: SDUPTHER

## 2019-07-05 RX ORDER — INSULIN DETEMIR 100 [IU]/ML
INJECTION, SOLUTION SUBCUTANEOUS
Qty: 45 ML | Refills: 2 | Status: SHIPPED | OUTPATIENT
Start: 2019-07-05 | End: 2020-06-29

## 2019-07-15 RX ORDER — NORGESTIMATE AND ETHINYL ESTRADIOL 7DAYSX3 28
KIT ORAL
Qty: 28 TABLET | Refills: 0 | Status: SHIPPED | OUTPATIENT
Start: 2019-07-15 | End: 2019-08-14 | Stop reason: SDUPTHER

## 2019-07-22 RX ORDER — ROSUVASTATIN CALCIUM 40 MG/1
TABLET, COATED ORAL
Qty: 90 TABLET | Refills: 10 | Status: SHIPPED | OUTPATIENT
Start: 2019-07-22 | End: 2019-08-14 | Stop reason: SDUPTHER

## 2019-07-26 ENCOUNTER — OFFICE VISIT (OUTPATIENT)
Dept: ENDOCRINOLOGY | Age: 39
End: 2019-07-26

## 2019-07-26 VITALS
SYSTOLIC BLOOD PRESSURE: 106 MMHG | WEIGHT: 209 LBS | HEIGHT: 62 IN | BODY MASS INDEX: 38.46 KG/M2 | DIASTOLIC BLOOD PRESSURE: 64 MMHG | HEART RATE: 91 BPM | OXYGEN SATURATION: 98 %

## 2019-07-26 DIAGNOSIS — F32.A DEPRESSION, UNSPECIFIED DEPRESSION TYPE: ICD-10-CM

## 2019-07-26 DIAGNOSIS — R63.5 ABNORMAL WEIGHT GAIN: ICD-10-CM

## 2019-07-26 DIAGNOSIS — IMO0002 UNCONTROLLED TYPE II DIABETES MELLITUS WITH NEPHROPATHY: ICD-10-CM

## 2019-07-26 DIAGNOSIS — E78.5 HYPERLIPIDEMIA ASSOCIATED WITH TYPE 2 DIABETES MELLITUS (HCC): ICD-10-CM

## 2019-07-26 DIAGNOSIS — IMO0002 UNCONTROLLED TYPE 2 DIABETES MELLITUS WITH COMPLICATION, WITH LONG-TERM CURRENT USE OF INSULIN: Primary | ICD-10-CM

## 2019-07-26 DIAGNOSIS — Z79.4 ENCOUNTER FOR LONG-TERM (CURRENT) USE OF INSULIN (HCC): ICD-10-CM

## 2019-07-26 DIAGNOSIS — E16.1 HYPERINSULINISM: ICD-10-CM

## 2019-07-26 DIAGNOSIS — E11.69 HYPERLIPIDEMIA ASSOCIATED WITH TYPE 2 DIABETES MELLITUS (HCC): ICD-10-CM

## 2019-07-26 DIAGNOSIS — E03.9 ACQUIRED HYPOTHYROIDISM: ICD-10-CM

## 2019-07-26 PROCEDURE — 99214 OFFICE O/P EST MOD 30 MIN: CPT | Performed by: INTERNAL MEDICINE

## 2019-07-26 RX ORDER — ESCITALOPRAM OXALATE 10 MG/1
10 TABLET ORAL DAILY
Qty: 30 TABLET | Refills: 1 | Status: SHIPPED | OUTPATIENT
Start: 2019-07-26 | End: 2019-08-14 | Stop reason: SDUPTHER

## 2019-07-27 LAB
ALBUMIN SERPL-MCNC: 4.3 G/DL (ref 3.5–5.2)
ALBUMIN/CREAT UR: 164.9 MG/G CREAT (ref 0–30)
ALBUMIN/GLOB SERPL: 1.3 G/DL
ALP SERPL-CCNC: 56 U/L (ref 39–117)
ALT SERPL-CCNC: 57 U/L (ref 1–33)
AST SERPL-CCNC: 58 U/L (ref 1–32)
BILIRUB SERPL-MCNC: 0.4 MG/DL (ref 0.2–1.2)
BUN SERPL-MCNC: 11 MG/DL (ref 6–20)
BUN/CREAT SERPL: 17.2 (ref 7–25)
CALCIUM SERPL-MCNC: 9.3 MG/DL (ref 8.6–10.5)
CHLORIDE SERPL-SCNC: 104 MMOL/L (ref 98–107)
CO2 SERPL-SCNC: 23 MMOL/L (ref 22–29)
CREAT SERPL-MCNC: 0.64 MG/DL (ref 0.57–1)
CREAT UR-MCNC: 61.5 MG/DL
GLOBULIN SER CALC-MCNC: 3.4 GM/DL
GLUCOSE SERPL-MCNC: 116 MG/DL (ref 65–99)
HBA1C MFR BLD: 7.2 % (ref 4.8–5.6)
MICROALBUMIN UR-MCNC: 101.4 UG/ML
POTASSIUM SERPL-SCNC: 5.1 MMOL/L (ref 3.5–5.2)
PROT SERPL-MCNC: 7.7 G/DL (ref 6–8.5)
SODIUM SERPL-SCNC: 140 MMOL/L (ref 136–145)
T4 FREE SERPL-MCNC: 1.55 NG/DL (ref 0.93–1.7)
TSH SERPL DL<=0.005 MIU/L-ACNC: 5.01 MIU/ML (ref 0.27–4.2)

## 2019-07-29 RX ORDER — DAPAGLIFLOZIN AND METFORMIN HYDROCHLORIDE 5; 1000 MG/1; MG/1
TABLET, FILM COATED, EXTENDED RELEASE ORAL
Qty: 60 TABLET | Refills: 0 | Status: SHIPPED | OUTPATIENT
Start: 2019-07-29 | End: 2019-08-28 | Stop reason: SDUPTHER

## 2019-08-11 PROBLEM — F32.A DEPRESSION: Status: ACTIVE | Noted: 2019-08-11

## 2019-08-14 ENCOUNTER — OFFICE VISIT (OUTPATIENT)
Dept: FAMILY MEDICINE CLINIC | Facility: CLINIC | Age: 39
End: 2019-08-14

## 2019-08-14 VITALS
RESPIRATION RATE: 16 BRPM | BODY MASS INDEX: 39.38 KG/M2 | SYSTOLIC BLOOD PRESSURE: 110 MMHG | HEIGHT: 62 IN | DIASTOLIC BLOOD PRESSURE: 66 MMHG | WEIGHT: 214 LBS | OXYGEN SATURATION: 98 % | HEART RATE: 82 BPM

## 2019-08-14 DIAGNOSIS — R30.0 DYSURIA: ICD-10-CM

## 2019-08-14 DIAGNOSIS — Z00.00 PHYSICAL EXAM: Primary | ICD-10-CM

## 2019-08-14 DIAGNOSIS — F32.A DEPRESSION, UNSPECIFIED DEPRESSION TYPE: ICD-10-CM

## 2019-08-14 DIAGNOSIS — E78.41 ELEVATED LIPOPROTEIN(A): ICD-10-CM

## 2019-08-14 PROCEDURE — 99395 PREV VISIT EST AGE 18-39: CPT | Performed by: NURSE PRACTITIONER

## 2019-08-14 RX ORDER — ROSUVASTATIN CALCIUM 40 MG/1
40 TABLET, COATED ORAL DAILY
Qty: 90 TABLET | Refills: 3 | Status: SHIPPED | OUTPATIENT
Start: 2019-08-14 | End: 2020-09-09

## 2019-08-14 RX ORDER — ESCITALOPRAM OXALATE 10 MG/1
10 TABLET ORAL DAILY
Qty: 90 TABLET | Refills: 2 | Status: SHIPPED | OUTPATIENT
Start: 2019-08-14 | End: 2020-07-13

## 2019-08-14 RX ORDER — NORGESTIMATE AND ETHINYL ESTRADIOL 7DAYSX3 28
1 KIT ORAL DAILY
Qty: 28 TABLET | Refills: 11 | Status: SHIPPED | OUTPATIENT
Start: 2019-08-14 | End: 2020-07-20

## 2019-08-14 NOTE — PROGRESS NOTES
"Preventive Exam    History of Present Illness: Cora is here for check up and review of routine health maintenance. She states she is doing well and has no concerns  PMHX:Type 2 diabetes, depression, these are chronic issues she is followed by endocrine and is stable on her meds  PSHX D&C  PFHX: mother has HTN hyperlipidemia and diabetes  Good sleep hygiene  Regular exercise  Well balanced diet  Works at [employer]    REVIEW OF SYSTEMS  Constitutional: Negative.    HENT: Negative.    Eyes: Negative.    Respiratory: Negative.    Cardiovascular: Negative.    Gastrointestinal: Negative.    Endocrine: Negative.    Genitourinary: Negative.    Musculoskeletal: Negative.  Skin: Negative.    Allergic/Immunologic: Negative.    Neurological: Negative.    Hematological: Negative.    Psychiatric/Behavioral: Negative.    All other systems reviewed and are negative.          PHYSICAL EXAM    Vitals:    08/14/19 1502   BP: 110/66   Pulse: 82   Resp: 16   SpO2: 98%   Weight: 97.1 kg (214 lb)   Height: 157.5 cm (62\")     GENERAL: alert and oriented, afebrile and vital signs stable  HEENT: oral mucosa moist, PEERLA, EOM, conjunctiva normal  No cervical adenopathy  LUNGS: clear to ascultation bilaterally, no rales, ronchi or wheezing  HEART: RRR S1 S2 without murmers, thrills, rubs or gallops  CHEST WALL: within normal limits, no tenderness  ABDOMEN: WNL. Normal BS.  EXTREMITIES: No clubbing, cyanosis or edema noted. Normal Pulses.  SKIN: warm, dry, no rashes noted  NEURO: CN II- XII grossly intact    ASSESSMENT AND PLAN  Problem List Items Addressed This Visit     None        Routine health maintenance reviewed and discussed with Cora.    .No orders of the defined types were placed in this encounter.    No Follow-up on file.   Pt regularly sees endocrine so only needs to follow with us yearly  "

## 2019-08-16 LAB
BACTERIA UR CULT: ABNORMAL
BACTERIA UR CULT: ABNORMAL
OTHER ANTIBIOTIC SUSC ISLT: ABNORMAL

## 2019-08-19 RX ORDER — CEFDINIR 300 MG/1
300 CAPSULE ORAL 2 TIMES DAILY
Qty: 14 CAPSULE | Refills: 0 | Status: SHIPPED | OUTPATIENT
Start: 2019-08-19 | End: 2020-01-06

## 2019-08-28 RX ORDER — DAPAGLIFLOZIN AND METFORMIN HYDROCHLORIDE 5; 1000 MG/1; MG/1
TABLET, FILM COATED, EXTENDED RELEASE ORAL
Qty: 60 TABLET | Refills: 0 | Status: SHIPPED | OUTPATIENT
Start: 2019-08-28 | End: 2019-10-01 | Stop reason: SDUPTHER

## 2019-10-01 RX ORDER — DAPAGLIFLOZIN AND METFORMIN HYDROCHLORIDE 5; 1000 MG/1; MG/1
TABLET, FILM COATED, EXTENDED RELEASE ORAL
Qty: 60 TABLET | Refills: 0 | Status: SHIPPED | OUTPATIENT
Start: 2019-10-01 | End: 2019-11-04 | Stop reason: SDUPTHER

## 2019-10-07 ENCOUNTER — TELEPHONE (OUTPATIENT)
Dept: ENDOCRINOLOGY | Age: 39
End: 2019-10-07

## 2019-10-07 RX ORDER — LEVOTHYROXINE SODIUM 150 MCG
150 TABLET ORAL DAILY
Qty: 30 TABLET | Refills: 5 | Status: SHIPPED | OUTPATIENT
Start: 2019-10-07 | End: 2020-02-28

## 2019-11-04 RX ORDER — DAPAGLIFLOZIN AND METFORMIN HYDROCHLORIDE 5; 1000 MG/1; MG/1
TABLET, FILM COATED, EXTENDED RELEASE ORAL
Qty: 60 TABLET | Refills: 0 | Status: SHIPPED | OUTPATIENT
Start: 2019-11-04 | End: 2019-12-07 | Stop reason: SDUPTHER

## 2019-11-04 RX ORDER — RANITIDINE 150 MG/1
TABLET ORAL
Qty: 30 TABLET | Refills: 4 | Status: SHIPPED | OUTPATIENT
Start: 2019-11-04 | End: 2020-11-06

## 2019-12-02 ENCOUNTER — RESULTS ENCOUNTER (OUTPATIENT)
Dept: ENDOCRINOLOGY | Age: 39
End: 2019-12-02

## 2019-12-02 DIAGNOSIS — E03.9 ACQUIRED HYPOTHYROIDISM: ICD-10-CM

## 2019-12-02 DIAGNOSIS — IMO0002 UNCONTROLLED TYPE II DIABETES MELLITUS WITH NEPHROPATHY: ICD-10-CM

## 2019-12-02 DIAGNOSIS — E03.9 ACQUIRED HYPOTHYROIDISM: Primary | ICD-10-CM

## 2019-12-09 RX ORDER — DAPAGLIFLOZIN AND METFORMIN HYDROCHLORIDE 5; 1000 MG/1; MG/1
TABLET, FILM COATED, EXTENDED RELEASE ORAL
Qty: 60 TABLET | Refills: 0 | Status: SHIPPED | OUTPATIENT
Start: 2019-12-09 | End: 2020-01-21

## 2020-01-06 ENCOUNTER — OFFICE VISIT (OUTPATIENT)
Dept: FAMILY MEDICINE CLINIC | Facility: CLINIC | Age: 40
End: 2020-01-06

## 2020-01-06 VITALS
TEMPERATURE: 97.8 F | OXYGEN SATURATION: 98 % | SYSTOLIC BLOOD PRESSURE: 124 MMHG | BODY MASS INDEX: 39.93 KG/M2 | DIASTOLIC BLOOD PRESSURE: 80 MMHG | HEART RATE: 77 BPM | WEIGHT: 217 LBS | HEIGHT: 62 IN

## 2020-01-06 DIAGNOSIS — J01.00 ACUTE NON-RECURRENT MAXILLARY SINUSITIS: Primary | ICD-10-CM

## 2020-01-06 PROCEDURE — 99213 OFFICE O/P EST LOW 20 MIN: CPT | Performed by: NURSE PRACTITIONER

## 2020-01-06 RX ORDER — AMOXICILLIN 875 MG/1
875 TABLET, COATED ORAL 2 TIMES DAILY
Qty: 14 TABLET | Refills: 0 | Status: SHIPPED | OUTPATIENT
Start: 2020-01-06 | End: 2020-01-13

## 2020-01-06 NOTE — PATIENT INSTRUCTIONS
Sinusitis, Adult  Sinusitis is inflammation of your sinuses. Sinuses are hollow spaces in the bones around your face. Your sinuses are located:  · Around your eyes.  · In the middle of your forehead.  · Behind your nose.  · In your cheekbones.  Mucus normally drains out of your sinuses. When your nasal tissues become inflamed or swollen, mucus can become trapped or blocked. This allows bacteria, viruses, and fungi to grow, which leads to infection. Most infections of the sinuses are caused by a virus.  Sinusitis can develop quickly. It can last for up to 4 weeks (acute) or for more than 12 weeks (chronic). Sinusitis often develops after a cold.  What are the causes?  This condition is caused by anything that creates swelling in the sinuses or stops mucus from draining. This includes:  · Allergies.  · Asthma.  · Infection from bacteria or viruses.  · Deformities or blockages in your nose or sinuses.  · Abnormal growths in the nose (nasal polyps).  · Pollutants, such as chemicals or irritants in the air.  · Infection from fungi (rare).  What increases the risk?  You are more likely to develop this condition if you:  · Have a weak body defense system (immune system).  · Do a lot of swimming or diving.  · Overuse nasal sprays.  · Smoke.  What are the signs or symptoms?  The main symptoms of this condition are pain and a feeling of pressure around the affected sinuses. Other symptoms include:  · Stuffy nose or congestion.  · Thick drainage from your nose.  · Swelling and warmth over the affected sinuses.  · Headache.  · Upper toothache.  · A cough that may get worse at night.  · Extra mucus that collects in the throat or the back of the nose (postnasal drip).  · Decreased sense of smell and taste.  · Fatigue.  · A fever.  · Sore throat.  · Bad breath.  How is this diagnosed?  This condition is diagnosed based on:  · Your symptoms.  · Your medical history.  · A physical exam.  · Tests to find out if your condition is  acute or chronic. This may include:  ? Checking your nose for nasal polyps.  ? Viewing your sinuses using a device that has a light (endoscope).  ? Testing for allergies or bacteria.  ? Imaging tests, such as an MRI or CT scan.  In rare cases, a bone biopsy may be done to rule out more serious types of fungal sinus disease.  How is this treated?  Treatment for sinusitis depends on the cause and whether your condition is chronic or acute.  · If caused by a virus, your symptoms should go away on their own within 10 days. You may be given medicines to relieve symptoms. They include:  ? Medicines that shrink swollen nasal passages (topical intranasal decongestants).  ? Medicines that treat allergies (antihistamines).  ? A spray that eases inflammation of the nostrils (topical intranasal corticosteroids).  ? Rinses that help get rid of thick mucus in your nose (nasal saline washes).  · If caused by bacteria, your health care provider may recommend waiting to see if your symptoms improve. Most bacterial infections will get better without antibiotic medicine. You may be given antibiotics if you have:  ? A severe infection.  ? A weak immune system.  · If caused by narrow nasal passages or nasal polyps, you may need to have surgery.  Follow these instructions at home:  Medicines  · Take, use, or apply over-the-counter and prescription medicines only as told by your health care provider. These may include nasal sprays.  · If you were prescribed an antibiotic medicine, take it as told by your health care provider. Do not stop taking the antibiotic even if you start to feel better.  Hydrate and humidify    · Drink enough fluid to keep your urine pale yellow. Staying hydrated will help to thin your mucus.  · Use a cool mist humidifier to keep the humidity level in your home above 50%.  · Inhale steam for 10-15 minutes, 3-4 times a day, or as told by your health care provider. You can do this in the bathroom while a hot shower is  running.  · Limit your exposure to cool or dry air.  Rest  · Rest as much as possible.  · Sleep with your head raised (elevated).  · Make sure you get enough sleep each night.  General instructions    · Apply a warm, moist washcloth to your face 3-4 times a day or as told by your health care provider. This will help with discomfort.  · Wash your hands often with soap and water to reduce your exposure to germs. If soap and water are not available, use hand .  · Do not smoke. Avoid being around people who are smoking (secondhand smoke).  · Keep all follow-up visits as told by your health care provider. This is important.  Contact a health care provider if:  · You have a fever.  · Your symptoms get worse.  · Your symptoms do not improve within 10 days.  Get help right away if:  · You have a severe headache.  · You have persistent vomiting.  · You have severe pain or swelling around your face or eyes.  · You have vision problems.  · You develop confusion.  · Your neck is stiff.  · You have trouble breathing.  Summary  · Sinusitis is soreness and inflammation of your sinuses. Sinuses are hollow spaces in the bones around your face.  · This condition is caused by nasal tissues that become inflamed or swollen. The swelling traps or blocks the flow of mucus. This allows bacteria, viruses, and fungi to grow, which leads to infection.  · If you were prescribed an antibiotic medicine, take it as told by your health care provider. Do not stop taking the antibiotic even if you start to feel better.  · Keep all follow-up visits as told by your health care provider. This is important.  This information is not intended to replace advice given to you by your health care provider. Make sure you discuss any questions you have with your health care provider.  Document Released: 12/18/2006 Document Revised: 05/20/2019 Document Reviewed: 05/20/2019  [employer] Interactive Patient Education © 2019 [employer]

## 2020-01-06 NOTE — PROGRESS NOTES
Subjective sinus pain, drainage and productive cough  Cora Gutiérrez is a 39 y.o. female.     History of Present Illness   Has had a productive cough and sputum  Is yellow and thick. Went to the [facility] clinic a couple of days ago and her flu and strep tests were negative.  Has been using otc cough and cold with no improvement.    The following portions of the patient's history were reviewed and updated as appropriate: allergies, current medications, past family history, past medical history, past social history, past surgical history and problem list.    Review of Systems   Constitutional: Positive for fatigue.   HENT: Positive for congestion, sinus pressure and sore throat.    Respiratory: Positive for cough.        Objective   Physical Exam   Constitutional: She appears well-developed. She appears distressed.   Ill appearing but non toxic   HENT:   Head: Normocephalic and atraumatic.   Right Ear: External ear normal.   Lt tm with fluid  Op red with copious drainage  + maxillary sinus pressure and pain   Eyes: Pupils are equal, round, and reactive to light. EOM are normal.   Neck: Neck supple.   Cardiovascular: Normal rate and regular rhythm.   Pulmonary/Chest: Effort normal and breath sounds normal.   Musculoskeletal: Normal range of motion.   Neurological: She is alert.   Skin: Skin is warm.   Nursing note and vitals reviewed.        Assessment/Plan   Cora was seen today for cough.    Diagnoses and all orders for this visit:    Acute non-recurrent maxillary sinusitis    Other orders  -     amoxicillin (AMOXIL) 875 MG tablet; Take 1 tablet by mouth 2 (Two) Times a Day for 7 days.

## 2020-01-08 ENCOUNTER — TELEPHONE (OUTPATIENT)
Dept: FAMILY MEDICINE CLINIC | Facility: CLINIC | Age: 40
End: 2020-01-08

## 2020-01-08 NOTE — TELEPHONE ENCOUNTER
Let her know if she wants to stay off for the rest of the week she can come get a note. I will not change her prescription tell her to give it some more time

## 2020-01-08 NOTE — TELEPHONE ENCOUNTER
Patient called stated that the amoxicillin (AMOXIL) 875 MG tablet is not working for her she is not feeling any better.    Patient would like something stronger called into her [facility] on [address]    Patient would like to know if she can get a doctors note putting her off the rest of the week    Patient call back [phone]

## 2020-01-21 RX ORDER — DAPAGLIFLOZIN AND METFORMIN HYDROCHLORIDE 5; 1000 MG/1; MG/1
TABLET, FILM COATED, EXTENDED RELEASE ORAL
Qty: 60 TABLET | Refills: 0 | Status: SHIPPED | OUTPATIENT
Start: 2020-01-21 | End: 2020-02-25

## 2020-02-25 RX ORDER — DAPAGLIFLOZIN AND METFORMIN HYDROCHLORIDE 5; 1000 MG/1; MG/1
TABLET, FILM COATED, EXTENDED RELEASE ORAL
Qty: 60 TABLET | Refills: 0 | Status: SHIPPED | OUTPATIENT
Start: 2020-02-25 | End: 2020-03-27

## 2020-02-28 RX ORDER — LEVOTHYROXINE SODIUM 150 MCG
TABLET ORAL
Qty: 30 TABLET | Refills: 5 | Status: SHIPPED | OUTPATIENT
Start: 2020-02-28 | End: 2020-08-21

## 2020-03-23 ENCOUNTER — TELEPHONE (OUTPATIENT)
Dept: ENDOCRINOLOGY | Age: 40
End: 2020-03-23

## 2020-03-27 RX ORDER — DAPAGLIFLOZIN AND METFORMIN HYDROCHLORIDE 5; 1000 MG/1; MG/1
TABLET, FILM COATED, EXTENDED RELEASE ORAL
Qty: 60 TABLET | Refills: 0 | Status: SHIPPED | OUTPATIENT
Start: 2020-03-27 | End: 2020-04-24

## 2020-04-11 RX ORDER — RANITIDINE 150 MG/1
TABLET ORAL
Qty: 30 TABLET | Refills: 3 | Status: CANCELLED | OUTPATIENT
Start: 2020-04-11

## 2020-04-13 RX ORDER — FAMOTIDINE 10 MG
TABLET ORAL
OUTPATIENT
Start: 2020-04-13

## 2020-04-24 RX ORDER — DAPAGLIFLOZIN AND METFORMIN HYDROCHLORIDE 5; 1000 MG/1; MG/1
TABLET, FILM COATED, EXTENDED RELEASE ORAL
Qty: 60 TABLET | Refills: 0 | Status: SHIPPED | OUTPATIENT
Start: 2020-04-24 | End: 2020-05-26

## 2020-05-26 RX ORDER — DAPAGLIFLOZIN AND METFORMIN HYDROCHLORIDE 5; 1000 MG/1; MG/1
TABLET, FILM COATED, EXTENDED RELEASE ORAL
Qty: 60 TABLET | Refills: 0 | Status: SHIPPED | OUTPATIENT
Start: 2020-05-26 | End: 2020-06-22

## 2020-06-09 RX ORDER — LIRAGLUTIDE 6 MG/ML
INJECTION SUBCUTANEOUS
Qty: 27 PEN | Refills: 0 | Status: SHIPPED | OUTPATIENT
Start: 2020-06-09 | End: 2020-09-09

## 2020-06-22 RX ORDER — DAPAGLIFLOZIN AND METFORMIN HYDROCHLORIDE 5; 1000 MG/1; MG/1
TABLET, FILM COATED, EXTENDED RELEASE ORAL
Qty: 60 TABLET | Refills: 0 | Status: SHIPPED | OUTPATIENT
Start: 2020-06-22 | End: 2020-07-20

## 2020-06-29 RX ORDER — INSULIN DETEMIR 100 [IU]/ML
INJECTION, SOLUTION SUBCUTANEOUS
Qty: 45 PEN | Refills: 1 | Status: SHIPPED | OUTPATIENT
Start: 2020-06-29 | End: 2021-05-25 | Stop reason: SDUPTHER

## 2020-07-13 RX ORDER — ESCITALOPRAM OXALATE 10 MG/1
TABLET ORAL
Qty: 30 TABLET | Refills: 0 | Status: SHIPPED | OUTPATIENT
Start: 2020-07-13 | End: 2020-08-18

## 2020-07-20 RX ORDER — NORGESTIMATE AND ETHINYL ESTRADIOL 7DAYSX3 28
KIT ORAL
Qty: 84 TABLET | Refills: 2 | Status: SHIPPED | OUTPATIENT
Start: 2020-07-20 | End: 2021-04-27

## 2020-07-20 RX ORDER — DAPAGLIFLOZIN AND METFORMIN HYDROCHLORIDE 5; 1000 MG/1; MG/1
TABLET, FILM COATED, EXTENDED RELEASE ORAL
Qty: 60 TABLET | Refills: 0 | Status: SHIPPED | OUTPATIENT
Start: 2020-07-20 | End: 2020-08-18

## 2020-08-11 ENCOUNTER — TELEPHONE (OUTPATIENT)
Dept: FAMILY MEDICINE CLINIC | Facility: CLINIC | Age: 40
End: 2020-08-11

## 2020-08-11 NOTE — TELEPHONE ENCOUNTER
PATIENT CALLED IN AND STATED SHE WAS AROUND A FAMILY MEMBER WHO TESTED POSITIVE FOR COVID-19 . PATIENT WOULD LIKE TO GET TESTED . PLEASE CALL HER AT [phone]

## 2020-08-18 RX ORDER — DAPAGLIFLOZIN AND METFORMIN HYDROCHLORIDE 5; 1000 MG/1; MG/1
TABLET, FILM COATED, EXTENDED RELEASE ORAL
Qty: 60 TABLET | Refills: 0 | Status: SHIPPED | OUTPATIENT
Start: 2020-08-18 | End: 2020-09-16

## 2020-08-18 RX ORDER — ESCITALOPRAM OXALATE 10 MG/1
TABLET ORAL
Qty: 30 TABLET | Refills: 0 | Status: SHIPPED | OUTPATIENT
Start: 2020-08-18 | End: 2020-09-16

## 2020-08-21 RX ORDER — LEVOTHYROXINE SODIUM 150 MCG
TABLET ORAL
Qty: 30 TABLET | Refills: 5 | Status: SHIPPED | OUTPATIENT
Start: 2020-08-21 | End: 2021-02-23 | Stop reason: SDUPTHER

## 2020-09-09 RX ORDER — LIRAGLUTIDE 6 MG/ML
INJECTION SUBCUTANEOUS
Qty: 5 PEN | Refills: 0 | Status: SHIPPED | OUTPATIENT
Start: 2020-09-09 | End: 2020-12-14

## 2020-09-09 RX ORDER — ROSUVASTATIN CALCIUM 40 MG/1
TABLET, COATED ORAL
Qty: 90 TABLET | Refills: 9 | Status: SHIPPED | OUTPATIENT
Start: 2020-09-09 | End: 2021-09-10

## 2020-09-16 RX ORDER — ESCITALOPRAM OXALATE 10 MG/1
TABLET ORAL
Qty: 30 TABLET | Refills: 0 | Status: SHIPPED | OUTPATIENT
Start: 2020-09-16 | End: 2020-11-06 | Stop reason: SDUPTHER

## 2020-09-16 RX ORDER — DAPAGLIFLOZIN AND METFORMIN HYDROCHLORIDE 5; 1000 MG/1; MG/1
TABLET, FILM COATED, EXTENDED RELEASE ORAL
Qty: 60 TABLET | Refills: 0 | Status: SHIPPED | OUTPATIENT
Start: 2020-09-16 | End: 2020-11-05 | Stop reason: SDUPTHER

## 2020-11-05 RX ORDER — DAPAGLIFLOZIN AND METFORMIN HYDROCHLORIDE 5; 1000 MG/1; MG/1
1 TABLET, FILM COATED, EXTENDED RELEASE ORAL 2 TIMES DAILY
Qty: 60 TABLET | Refills: 0 | Status: SHIPPED | OUTPATIENT
Start: 2020-11-05 | End: 2020-11-06 | Stop reason: SDUPTHER

## 2020-11-05 NOTE — TELEPHONE ENCOUNTER
Okay to do the refill of the Xigduo extended release.  The Lexapro should come from the primary care.

## 2020-11-05 NOTE — TELEPHONE ENCOUNTER
Patient is completely out of meds and have been for a week. Patient needs this refilled today please.    escitalopram (LEXAPRO) 10 MG tablet [[id number]]    Xigduo XR 5-1000 MG tablet [[id number]]        Please send this to   [facility] [address] [place] [address] - [address] [place] [address] AT [facility] - [phone]  - [phone] FX   Phone:  [phone]   Fax:  [phone]   Address:  [address] [place] [address]

## 2020-11-06 DIAGNOSIS — F32.A DEPRESSION, UNSPECIFIED DEPRESSION TYPE: Primary | ICD-10-CM

## 2020-11-06 RX ORDER — DAPAGLIFLOZIN AND METFORMIN HYDROCHLORIDE 5; 1000 MG/1; MG/1
1 TABLET, FILM COATED, EXTENDED RELEASE ORAL 2 TIMES DAILY
Qty: 60 TABLET | Refills: 0 | Status: SHIPPED | OUTPATIENT
Start: 2020-11-06 | End: 2020-11-16 | Stop reason: SDUPTHER

## 2020-11-06 RX ORDER — ESCITALOPRAM OXALATE 10 MG/1
10 TABLET ORAL DAILY
Qty: 30 TABLET | Refills: 0 | OUTPATIENT
Start: 2020-11-06

## 2020-11-06 RX ORDER — ESCITALOPRAM OXALATE 10 MG/1
10 TABLET ORAL DAILY
Qty: 30 TABLET | Refills: 2 | Status: SHIPPED | OUTPATIENT
Start: 2020-11-06 | End: 2020-12-08 | Stop reason: SDUPTHER

## 2020-11-06 RX ORDER — DAPAGLIFLOZIN AND METFORMIN HYDROCHLORIDE 5; 1000 MG/1; MG/1
1 TABLET, FILM COATED, EXTENDED RELEASE ORAL 2 TIMES DAILY
Qty: 60 TABLET | Refills: 0 | OUTPATIENT
Start: 2020-11-06

## 2020-11-16 RX ORDER — DAPAGLIFLOZIN AND METFORMIN HYDROCHLORIDE 5; 1000 MG/1; MG/1
1 TABLET, FILM COATED, EXTENDED RELEASE ORAL 2 TIMES DAILY
Qty: 60 TABLET | Refills: 0 | Status: SHIPPED | OUTPATIENT
Start: 2020-11-16 | End: 2020-11-16 | Stop reason: SDUPTHER

## 2020-11-16 RX ORDER — DAPAGLIFLOZIN AND METFORMIN HYDROCHLORIDE 5; 1000 MG/1; MG/1
1 TABLET, FILM COATED, EXTENDED RELEASE ORAL 2 TIMES DAILY
Qty: 60 TABLET | Refills: 2 | Status: SHIPPED | OUTPATIENT
Start: 2020-11-16 | End: 2021-06-14

## 2020-11-20 ENCOUNTER — TELEMEDICINE (OUTPATIENT)
Dept: ENDOCRINOLOGY | Age: 40
End: 2020-11-20

## 2020-11-20 DIAGNOSIS — E78.2 HYPERLIPEMIA, MIXED: ICD-10-CM

## 2020-11-20 DIAGNOSIS — E03.9 ACQUIRED HYPOTHYROIDISM: ICD-10-CM

## 2020-11-20 DIAGNOSIS — IMO0002 DM (DIABETES MELLITUS), TYPE 2, UNCONTROLLED W/NEUROLOGIC COMPLICATION: Primary | ICD-10-CM

## 2020-11-20 DIAGNOSIS — E66.9 OBESITY (BMI 35.0-39.9 WITHOUT COMORBIDITY): ICD-10-CM

## 2020-11-20 PROCEDURE — 99214 OFFICE O/P EST MOD 30 MIN: CPT | Performed by: INTERNAL MEDICINE

## 2020-11-20 NOTE — PROGRESS NOTES
Chief Complaint   Patient presents with   • Diabetes   • Hypothyroidism       Cora Gutiérrez 40 y.o. presents with Type 2 dm as a F/u patient.     Type 2 dm - Diagnosed about 5 - 10 years ago.   Today in clinic pt reports being on Levemir 40 units daily, Victoza 1.2 mg subcutaneous daily, she could not tolerate the higher dosage of the medication, xigduo 1 tab twice daily.   FBG -has not been frequently checking  Checks BG -rarely checks   Sensor -no  Dm retinopathy -no history,Last eye exam -due for the eye exam  Dm nephropathy -no  Dm neuropathy -occasional,Dm neuropathy meds -not on any meds  CAD -no  CVA -no  Episodes of hypoglycemia -none in the last few months  Pt is physically active. weight has been stable.   Pt tries to follow DM diet for most part.   On Ace inb.    Hypothyroidism  On levothyroxine 150 mcg oral daily, patient is concerned that she is not able to lose any weight.    Reviewed primary care physician's/consulting physician documentation and lab results     You have chosen to receive care through a telehealth visit.  Do you consent to use a video/audio connection for your medical care today? Yes    I have reviewed the patient's allergies, medicines, past medical hx, family hx and social hx in detail.    Past Medical History:   Diagnosis Date   • Diabetes mellitus (CMS/HCC)    • Fatty liver    • H/O Pancreatitis, acute    • Hyperlipidemia    • Hypothyroidism    • Left ovarian cyst    • Type 2 diabetes mellitus (CMS/HCC)        Family History   Problem Relation Age of Onset   • Diabetes Mother    • Hyperlipidemia Mother    • Hypertension Mother        Social History     Socioeconomic History   • Marital status:      Spouse name: Not on file   • Number of children: Not on file   • Years of education: Not on file   • Highest education level: Not on file   Occupational History   • Occupation: Office of    Tobacco Use   • Smoking status: Smoker, Current Status Unknown      Packs/day: 0.50     Types: Electronic Cigarette   • Smokeless tobacco: Never Used   • Tobacco comment: quit cigarrettes 4/01/19   Substance and Sexual Activity   • Alcohol use: Yes     Comment: 1 every 3 months    • Drug use: No   • Sexual activity: Yes     Partners: Male     Birth control/protection: OCP       No Known Allergies      Current Outpatient Medications:   •  dapagliflozin-metformin HCl ER (Xigduo XR) 5-1000 MG tablet, Take 1 tablet by mouth 2 (Two) Times a Day., Disp: 60 tablet, Rfl: 2  •  escitalopram (LEXAPRO) 10 MG tablet, Take 1 tablet by mouth Daily., Disp: 30 tablet, Rfl: 2  •  glucose blood (ACCU-CHEK SMARTVIEW) test strip, Check 4 times daily Use as instructed, Disp: 100 each, Rfl: 12  •  insulin aspart (novoLOG FLEXPEN) 100 UNIT/ML solution pen-injector sc pen, Inject 15 Units under the skin into the appropriate area as directed Daily., Disp: 5 pen, Rfl: 1  •  Insulin Pen Needle (BD PEN NEEDLE YAZMIN U/F) 32G X 4 MM misc, USE FIVE TIMES DAILY WITH INSULIN, Disp: 200 each, Rfl: 3  •  LEVEMIR FLEXTOUCH 100 UNIT/ML injection, INJECT 50 UNITS UNDER THE SKIN EACH NIGHT, Disp: 45 pen, Rfl: 1  •  lidocaine (LIDODERM) 5 %, , Disp: , Rfl:   •  ondansetron ODT (ZOFRAN ODT) 8 MG disintegrating tablet, Take 1 tablet by mouth Every 8 (Eight) Hours As Needed for Nausea or Vomiting., Disp: 21 tablet, Rfl: 0  •  rosuvastatin (CRESTOR) 40 MG tablet, TAKE ONE TABLET BY MOUTH DAILY, Disp: 90 tablet, Rfl: 9  •  SYNTHROID 150 MCG tablet, TAKE 1 TABLET DAILY, Disp: 30 tablet, Rfl: 5  •  TRI-SPRINTEC 0.18/0.215/0.25 MG-35 MCG per tablet, TAKE ONE TABLET BY MOUTH DAILY, Disp: 84 tablet, Rfl: 2  •  VICTOZA 18 MG/3ML solution pen-injector injection, DIAL AND INJECT 1.8 MG UNDER THE SKIN INTO THE APPROPRIATE AREA AS DIRECTED DAILY, Disp: 5 pen, Rfl: 0    Review of Systems   Constitutional: Positive for fatigue. Negative for appetite change and fever.   Eyes: Negative for visual disturbance.   Respiratory: Negative for  shortness of breath.    Cardiovascular: Negative for palpitations and leg swelling.   Gastrointestinal: Negative for abdominal pain and vomiting.   Endocrine: Negative for polydipsia and polyuria.   Musculoskeletal: Negative for joint swelling and neck pain.   Skin: Negative for rash.   Neurological: Negative for weakness and numbness.   Psychiatric/Behavioral: Negative for behavioral problems.       I have reviewed and confirmed the accuracy of the ROS as documented by the MA/LPN/RN Fazal Jaramillo MD        Objective:     There were no vitals taken for this visit.    Physical Exam  General appearance - no distress  Eyes-PERRLA, anicteric sclera  Ear nose and throat-external ears and nose normal.  Noted midline trachea.  Respiratory-normal chest on inspection.  No respiratory distress noted.  Musculoskeletal-no edema.  Hammer toes noted.  Skin-no rashes.  Neuro-alert and oriented x3                 Results Review:    I reviewed the patient's new clinical results.    Office Visit on 08/14/2019   Component Date Value Ref Range Status   • Urine Culture 08/14/2019 Final report*  Final   • Result 1 08/14/2019 Comment*  Final    Comment: Coagulase negative Staphylococcus species, not Staphylococcus  saprophyticus.  25,000-50,000 colony forming units per mL  Based on susceptibility to oxacillin this isolate would be  susceptible to:  *Penicillinase-stable penicillins, such as:    Cloxacillin, Dicloxacillin, Nafcillin  *Beta-lactam combination agents, such as:    Amoxicillin-clavulanic acid, Ampicillin-sulbactam,    Piperacillin-tazobactam  *Oral cephems, such as:    Cefaclor, Cefdinir, Cefpodoxime, Cefprozil, Cefuroxime,    Cephalexin, Loracarbef  *Parenteral cephems, such as:    Cefazolin, Cefepime, Cefotaxime, Cefotetan, Ceftaroline,    Ceftizoxime, Ceftriaxone, Cefuroxime  *Carbapenems, such as:    Doripenem, Ertapenem, Imipenem, Meropenem  Most isolates of Staphylococcus sp. produce a beta-lactamase enzyme  rendering  them resistant to penicillin. Please contact the laboratory  if penicillin is being considered for therapy.     • Susceptibility Testing 08/14/2019 Comment   Final    Comment:       ** S = Susceptible; I = Intermediate; R = Resistant **                     P = Positive; N = Negative              MICS are expressed in micrograms per mL     Antibiotic                 RSLT#1    RSLT#2    RSLT#3    RSLT#4  Ciprofloxacin                  S  Gentamicin                     S  Levofloxacin                   S  Nitrofurantoin                 S  Oxacillin                      S  Rifampin                       S  Tetracycline                   S  Trimethoprim/Sulfa             S  Vancomycin                     S         Diagnoses and all orders for this visit:    1. DM (diabetes mellitus), type 2, uncontrolled w/neurologic complication (CMS/HCC) (Primary)  -     TSH; Future  -     T4, Free; Future  -     Basic Metabolic Panel; Future  -     Hemoglobin A1c; Future  -     Vitamin B12 & Folate; Future  -     Vitamin D 25 Hydroxy; Future  -     Lipid Panel; Future  -     Hemoglobin A1c; Future  -     Creatinine, Serum; Future  -     eGFR-Glomerular Filtration; Future  -     Lipid Panel; Future  -     Microalbumin / Creatinine Urine Ratio - Urine, Clean Catch; Future  -     TSH; Future  -     Vitamin B12 & Folate; Future  -     Vitamin D 25 Hydroxy; Future  -     T4, Free; Future    2. Obesity (BMI 35.0-39.9 without comorbidity)  -     TSH; Future  -     T4, Free; Future  -     Basic Metabolic Panel; Future  -     Hemoglobin A1c; Future  -     Vitamin B12 & Folate; Future  -     Vitamin D 25 Hydroxy; Future  -     Lipid Panel; Future  -     Hemoglobin A1c; Future  -     Creatinine, Serum; Future  -     eGFR-Glomerular Filtration; Future  -     Lipid Panel; Future  -     Microalbumin / Creatinine Urine Ratio - Urine, Clean Catch; Future  -     TSH; Future  -     Vitamin B12 & Folate; Future  -     Vitamin D 25 Hydroxy; Future  -      T4, Free; Future    3. Hyperlipemia, mixed  -     TSH; Future  -     T4, Free; Future  -     Basic Metabolic Panel; Future  -     Hemoglobin A1c; Future  -     Vitamin B12 & Folate; Future  -     Vitamin D 25 Hydroxy; Future  -     Lipid Panel; Future  -     Hemoglobin A1c; Future  -     Creatinine, Serum; Future  -     eGFR-Glomerular Filtration; Future  -     Lipid Panel; Future  -     Microalbumin / Creatinine Urine Ratio - Urine, Clean Catch; Future  -     TSH; Future  -     Vitamin B12 & Folate; Future  -     Vitamin D 25 Hydroxy; Future  -     T4, Free; Future    4. Acquired hypothyroidism  -     TSH; Future  -     T4, Free; Future  -     Basic Metabolic Panel; Future  -     Hemoglobin A1c; Future  -     Vitamin B12 & Folate; Future  -     Vitamin D 25 Hydroxy; Future  -     Lipid Panel; Future  -     Hemoglobin A1c; Future  -     Creatinine, Serum; Future  -     eGFR-Glomerular Filtration; Future  -     Lipid Panel; Future  -     Microalbumin / Creatinine Urine Ratio - Urine, Clean Catch; Future  -     TSH; Future  -     Vitamin B12 & Folate; Future  -     Vitamin D 25 Hydroxy; Future  -     T4, Free; Future      Type 2 diabetes mellitus-uncontrolled  Check HbA1c  Adjust the medications based on the blood work-up  Might consider discontinuing Victoza and adding the Trulicity or Ozempic.  Continue the oral hypoglycemic agent.    Emphasized to the patient to check her blood sugars at least once or twice a day.    Hyperlipidemia  Check lipid panel next para hypothyroidism  Continue levothyroxine, check thyroid panel.    Obesity-worse  Explained to the patient that the GLP-1 analogs could help her to lose weight and also discussed about the behavioral modifications.  Thank you for asking me to see your patient, Cora Gutiérrez in consultation.    All the above problems are new for me      13   ( greater than 50% of the time) out of  25 minutes face-to-face spent counseling the patient on medication changes,  "treatment plan and the pathophysiology of GLP 1 analogues    Fazal Jaramillo MD  11/20/20    EMR Dragon / transcription disclaimer:     \"Dictated utilizing Dragon dictation\".   "

## 2020-11-30 ENCOUNTER — RESULTS ENCOUNTER (OUTPATIENT)
Dept: ENDOCRINOLOGY | Age: 40
End: 2020-11-30

## 2020-11-30 DIAGNOSIS — IMO0002 DM (DIABETES MELLITUS), TYPE 2, UNCONTROLLED W/NEUROLOGIC COMPLICATION: ICD-10-CM

## 2020-11-30 DIAGNOSIS — E03.9 ACQUIRED HYPOTHYROIDISM: ICD-10-CM

## 2020-11-30 DIAGNOSIS — E66.9 OBESITY (BMI 35.0-39.9 WITHOUT COMORBIDITY): ICD-10-CM

## 2020-11-30 DIAGNOSIS — E78.2 HYPERLIPEMIA, MIXED: ICD-10-CM

## 2020-12-08 ENCOUNTER — TELEMEDICINE (OUTPATIENT)
Dept: FAMILY MEDICINE CLINIC | Facility: CLINIC | Age: 40
End: 2020-12-08

## 2020-12-08 DIAGNOSIS — F32.A DEPRESSION, UNSPECIFIED DEPRESSION TYPE: ICD-10-CM

## 2020-12-08 PROCEDURE — 99213 OFFICE O/P EST LOW 20 MIN: CPT | Performed by: NURSE PRACTITIONER

## 2020-12-08 RX ORDER — ESCITALOPRAM OXALATE 10 MG/1
10 TABLET ORAL DAILY
Qty: 90 TABLET | Refills: 1 | Status: SHIPPED | OUTPATIENT
Start: 2020-12-08 | End: 2021-08-05

## 2020-12-08 NOTE — PROGRESS NOTES
Subjective medication refill  Cora TITI Gutiérrez is a 40 y.o. female.     History of Present Illness   Video visit  Continues to have some depression but she was placed on Lexapro and reports she is doing much better.  She would like for us to refill her medicine she has no HI or SI.    The following portions of the patient's history were reviewed and updated as appropriate: allergies, current medications, past family history, past medical history, past social history, past surgical history and problem list.    Review of Systems   Constitutional: Negative for activity change, appetite change, chills and fever.   HENT: Negative for congestion.    Eyes: Negative for pain.   Respiratory: Negative for cough and shortness of breath.    Cardiovascular: Negative for chest pain and leg swelling.   Gastrointestinal: Negative for nausea and vomiting.   Genitourinary: Negative for difficulty urinating.   Skin: Negative for rash.   Neurological: Negative for dizziness, facial asymmetry and headache.   Psychiatric/Behavioral: Positive for depressed mood. Negative for self-injury, sleep disturbance and suicidal ideas. The patient is not nervous/anxious.        Objective   Physical Exam  Vitals signs and nursing note reviewed.   Constitutional:       Appearance: She is well-developed.   HENT:      Head: Normocephalic and atraumatic.   Eyes:      Pupils: Pupils are equal, round, and reactive to light.   Pulmonary:      Effort: Pulmonary effort is normal.   Musculoskeletal: Normal range of motion.   Skin:     General: Skin is warm and dry.   Neurological:      Mental Status: She is alert and oriented to person, place, and time.   Psychiatric:         Thought Content: Thought content does not include homicidal or suicidal ideation. Thought content does not include homicidal or suicidal plan.           Assessment/Plan   Diagnoses and all orders for this visit:    1. Depression, unspecified depression type  -     escitalopram (LEXAPRO)  10 MG tablet; Take 1 tablet by mouth Daily.  Dispense: 90 tablet; Refill: 1    Continue your Lexapro follow-up in 3 months.  The use of a video visit has been reviewed with the patient and verbal informed consent has been obtained.  Spent approximately 15 minutes with the patient discussing her current complaint and she reports that the medication is helping and she would like to stay on it.  We will follow up in 3 months

## 2020-12-14 RX ORDER — LIRAGLUTIDE 6 MG/ML
INJECTION SUBCUTANEOUS
Qty: 15 PEN | Refills: 1 | Status: SHIPPED | OUTPATIENT
Start: 2020-12-14 | End: 2021-04-12

## 2021-02-04 RX ORDER — PEN NEEDLE, DIABETIC 32GX 5/32"
NEEDLE, DISPOSABLE MISCELLANEOUS
Qty: 500 EACH | Refills: 0 | Status: SHIPPED | OUTPATIENT
Start: 2021-02-04

## 2021-02-23 RX ORDER — LEVOTHYROXINE SODIUM 150 MCG
150 TABLET ORAL DAILY
Qty: 30 TABLET | Refills: 5 | Status: SHIPPED | OUTPATIENT
Start: 2021-02-23 | End: 2021-08-09

## 2021-02-23 NOTE — TELEPHONE ENCOUNTER
Fax request refill request [facility] synthroid 150mcg tablet take one tablet by mouth daily QTY 30

## 2021-04-02 ENCOUNTER — BULK ORDERING (OUTPATIENT)
Dept: CASE MANAGEMENT | Facility: OTHER | Age: 41
End: 2021-04-02

## 2021-04-02 DIAGNOSIS — Z23 IMMUNIZATION DUE: ICD-10-CM

## 2021-04-12 RX ORDER — LIRAGLUTIDE 6 MG/ML
INJECTION SUBCUTANEOUS
Qty: 3 ML | Refills: 6 | Status: SHIPPED | OUTPATIENT
Start: 2021-04-12 | End: 2021-07-29

## 2021-04-27 RX ORDER — NORGESTIMATE AND ETHINYL ESTRADIOL 7DAYSX3 28
KIT ORAL
Qty: 84 TABLET | Refills: 1 | Status: SHIPPED | OUTPATIENT
Start: 2021-04-27 | End: 2021-10-22

## 2021-05-25 RX ORDER — INSULIN DETEMIR 100 [IU]/ML
INJECTION, SOLUTION SUBCUTANEOUS
Qty: 45 ML | Refills: 0 | Status: SHIPPED | OUTPATIENT
Start: 2021-05-25 | End: 2021-09-03

## 2021-06-14 RX ORDER — DAPAGLIFLOZIN AND METFORMIN HYDROCHLORIDE 5; 1000 MG/1; MG/1
TABLET, FILM COATED, EXTENDED RELEASE ORAL
Qty: 60 TABLET | Refills: 0 | Status: SHIPPED | OUTPATIENT
Start: 2021-06-14 | End: 2021-07-12

## 2021-07-12 RX ORDER — DAPAGLIFLOZIN AND METFORMIN HYDROCHLORIDE 5; 1000 MG/1; MG/1
TABLET, FILM COATED, EXTENDED RELEASE ORAL
Qty: 60 TABLET | Refills: 3 | Status: SHIPPED | OUTPATIENT
Start: 2021-07-12 | End: 2021-11-30

## 2021-07-29 ENCOUNTER — TELEPHONE (OUTPATIENT)
Dept: PHARMACY | Facility: HOSPITAL | Age: 41
End: 2021-07-29

## 2021-07-29 ENCOUNTER — TELEMEDICINE (OUTPATIENT)
Dept: ENDOCRINOLOGY | Age: 41
End: 2021-07-29

## 2021-07-29 DIAGNOSIS — Z79.4 LONG-TERM INSULIN USE (HCC): Primary | ICD-10-CM

## 2021-07-29 DIAGNOSIS — Z79.4 TYPE 2 DIABETES MELLITUS WITH HYPERGLYCEMIA, WITH LONG-TERM CURRENT USE OF INSULIN (HCC): ICD-10-CM

## 2021-07-29 DIAGNOSIS — E03.9 ACQUIRED HYPOTHYROIDISM: ICD-10-CM

## 2021-07-29 DIAGNOSIS — E66.9 OBESITY (BMI 35.0-39.9 WITHOUT COMORBIDITY): ICD-10-CM

## 2021-07-29 DIAGNOSIS — E11.65 TYPE 2 DIABETES MELLITUS WITH HYPERGLYCEMIA, WITH LONG-TERM CURRENT USE OF INSULIN (HCC): ICD-10-CM

## 2021-07-29 PROCEDURE — 99214 OFFICE O/P EST MOD 30 MIN: CPT | Performed by: INTERNAL MEDICINE

## 2021-07-29 RX ORDER — PROCHLORPERAZINE 25 MG/1
SUPPOSITORY RECTAL
Qty: 3 EACH | Refills: 3 | Status: SHIPPED | OUTPATIENT
Start: 2021-07-29 | End: 2022-01-06

## 2021-07-29 RX ORDER — DULAGLUTIDE 1.5 MG/.5ML
1.5 INJECTION, SOLUTION SUBCUTANEOUS WEEKLY
Qty: 2 ML | Refills: 11 | Status: SHIPPED | OUTPATIENT
Start: 2021-07-29 | End: 2021-11-15

## 2021-07-29 RX ORDER — DULAGLUTIDE 1.5 MG/.5ML
1.5 INJECTION, SOLUTION SUBCUTANEOUS WEEKLY
Qty: 2 ML | Refills: 11 | Status: SHIPPED | OUTPATIENT
Start: 2021-07-29 | End: 2021-07-29 | Stop reason: SDUPTHER

## 2021-07-29 RX ORDER — PROCHLORPERAZINE 25 MG/1
1 SUPPOSITORY RECTAL
Qty: 1 EACH | Refills: 4 | Status: SHIPPED | OUTPATIENT
Start: 2021-07-29 | End: 2022-08-15

## 2021-07-29 NOTE — PROGRESS NOTES
Chief Complaint  Type 2 dm     Subjective          History of Present Illness    Cora Gutiérrez 41 y.o. presents with Type 2 dm as a F/u patient.    Type 2 dm - Diagnosed about  5 - 10years ago.   Today in clinic pt reports being on levemir 40 units bed time, xigduo 2 tabs per day, victoza 1.2 mg subq daily. ( cannot tolerate 1.8 mg sub daily), not doing the novolog recently.   Avg bg - running high.   Checks BG - doesn't check   Sensor - x  Dm retinopathy -x ,Last eye exam -   Dm nephropathy - x  Dm neuropathy - no hx,Dm neuropathy meds - n.a  CAD -x  CVA -x  Episodes of hypoglycemia - x  Pt is physically active. weight has been stable.   She does admit that she has not been following diabetic diet for the last few months      Hypothyroidism - on levothyroxine 150 mcg oral daily.     Reviewed primary care physician's/consulting physician documentation and lab results     You have chosen to receive care through a telehealth visit.  Do you consent to use a video/audio connection for your medical care today? Yes              I have reviewed the patient's allergies, medicines, past medical hx, family hx and social hx in detail.    Objective   Vital Signs:   There were no vitals taken for this visit. - tele health  Physical Exam   General appearance - no distress  Eyes- anicteric sclera  Ear nose and throat-external ears and nose normal.    Respiratory-normal chest on inspection.  No respiratory distress noted.  Skin-no rashes.  Neuro-alert and oriented x3            Result Review :   The following data was reviewed by: Fazal Jaramillo MD on 07/29/2021:  Results Encounter on 02/20/2021   Component Date Value Ref Range Status   • Hemoglobin A1C 07/22/2021 9.2* 4.8 - 5.6 % Final    Comment:          Prediabetes: 5.7 - 6.4           Diabetes: >6.4           Glycemic control for adults with diabetes: <7.0     • Creatinine 07/22/2021 0.81  0.57 - 1.00 mg/dL Final   • eGFR Non  Am 07/22/2021 91  >59 mL/min/1.73 Final    • eGFR  Am 07/22/2021 105  >59 mL/min/1.73 Final    Comment: **[facility] currently reports eGFR in compliance with the current**    recommendations of the National Kidney Foundation. [facility] will    update reporting as new guidelines are published from the NKF-ASN    Task force.     • Total Cholesterol 07/22/2021 164  100 - 199 mg/dL Final   • Triglycerides 07/22/2021 371* 0 - 149 mg/dL Final   • HDL Cholesterol 07/22/2021 34* >39 mg/dL Final   • VLDL Cholesterol Dionte 07/22/2021 59* 5 - 40 mg/dL Final   • LDL Chol Calc (NIH) 07/22/2021 71  0 - 99 mg/dL Final   • Creatinine, Urine 07/22/2021 107.3  Not Estab. mg/dL Final   • Microalbumin, Urine 07/22/2021 280.3  Not Estab. ug/mL Final   • Microalbumin/Creatinine Ratio 07/22/2021 261* 0 - 29 mg/g creat Final    Comment:                        Normal:                0 -  29                         Moderately increased: 30 - 300                         Severely increased:       >300     • TSH 07/22/2021 4.290  0.450 - 4.500 uIU/mL Final   • Vitamin B-12 07/22/2021 354  232 - 1,245 pg/mL Final   • Folate 07/22/2021 15.1  >3.0 ng/mL Final    Comment: A serum folate concentration of less than 3.1 ng/mL is  considered to represent clinical deficiency.     • 25 Hydroxy, Vitamin D 07/22/2021 16.1* 30.0 - 100.0 ng/mL Final    Comment: Vitamin D deficiency has been defined by the [place] of  Medicine and an Endocrine Society practice guideline as a  level of serum 25-OH vitamin D less than 20 ng/mL (1,2).  The Endocrine Society went on to further define vitamin D  insufficiency as a level between 21 and 29 ng/mL (2).  1. IOM ([place] of [employer]). 2010. Dietary reference     intakes for calcium and D. [place]: The     National Academies Press.  2. Shey MF, Reynaldo QUINTANILLA, Ciera ORTA, et al.     Evaluation, treatment, and prevention of vitamin D     deficiency: an Endocrine Society clinical practice     guideline. JCEM. 2011 Jul; 96(7):1911-30.     • Free  T4 07/22/2021 1.29  0.82 - 1.77 ng/dL Final   • Interpretation 07/22/2021 Note   Final    Supplemental report is available.     Data reviewed: pcp documentation       Results Review:    I reviewed the patient's new clinical results.     Assessment and Plan    Problem List Items Addressed This Visit        Other    Acquired hypothyroidism    Relevant Orders    Basic Metabolic Panel    Hemoglobin A1c    Lipid Panel    Microalbumin / Creatinine Urine Ratio - Urine, Clean Catch    TSH    T4, Free      Other Visit Diagnoses     Long-term insulin use (CMS/[facility])    -  Primary    Relevant Orders    Basic Metabolic Panel    Hemoglobin A1c    Lipid Panel    Microalbumin / Creatinine Urine Ratio - Urine, Clean Catch    TSH    T4, Free    Type 2 diabetes mellitus with hyperglycemia, with long-term current use of insulin (CMS/[facility])        Relevant Medications    Dulaglutide (Trulicity) 1.5 MG/0.5ML solution pen-injector    Other Relevant Orders    Basic Metabolic Panel    Hemoglobin A1c    Lipid Panel    Microalbumin / Creatinine Urine Ratio - Urine, Clean Catch    TSH    T4, Free    Obesity (BMI 35.0-39.9 without comorbidity)        Relevant Orders    Basic Metabolic Panel    Hemoglobin A1c    Lipid Panel    Microalbumin / Creatinine Urine Ratio - Urine, Clean Catch    TSH    T4, Free        Type 2 diabetes mellitus-severely uncontrolled, complicated with hyperglycemia  Increase the dosage of Levemir to 45 units at bedtime  Stop Victoza  Start Trulicity 1.5 mg subcutaneous once a week.  Continues xigduo - 2 tabs a day.    Patient could benefit with the CGM interpretation and monitoring of the blood sugars, sent in the prescription for the patient.    Hyperlipidemia  Continue Crestor 40 mg oral daily    Hypothyroidism  Continue Synthroid 150 mcg oral daily.    I spent 32 minutes caring for Cora on this date of service. This time includes time spent by me in the following activities:reviewing tests, counseling and educating the  "patient/family/caregiver, ordering medications, tests, or procedures, documenting information in the medical record, independently interpreting results and communicating that information with the patient/family/caregiver and care coordination        Interpreted the blood work-up/imaging results performed by the primary care/consulting physician -    Refills sent to pharmacy    Follow Up     Patient was given instructions and counseling regarding her condition or for health maintenance advice. Please see specific information pulled into the AVS if appropriate.       Thank you for asking me to see your patient, Cora Gutiérrez in consultation.         Fazal Jaramillo MD  07/29/21      EMR Dragon / transcription disclaimer:     \"Dictated utilizing Dragon dictation\".         "

## 2021-07-29 NOTE — TELEPHONE ENCOUNTER
Contacted Cora about new medications Dexcom and Trulicity - Trulicity is approved by insurance and a $25 copay. Dexcom is no copay. Patient is aware. She would like to utilize  mailorder for Trulicity. I have set up delivery and payment with patient. She had no further questions about side effects or medications for the pharmacist Laura.

## 2021-08-05 DIAGNOSIS — F32.A DEPRESSION, UNSPECIFIED DEPRESSION TYPE: ICD-10-CM

## 2021-08-05 RX ORDER — ESCITALOPRAM OXALATE 10 MG/1
TABLET ORAL
Qty: 90 TABLET | Refills: 1 | Status: SHIPPED | OUTPATIENT
Start: 2021-08-05 | End: 2022-02-28

## 2021-08-09 RX ORDER — LEVOTHYROXINE SODIUM 150 MCG
TABLET ORAL
Qty: 30 TABLET | Refills: 5 | Status: SHIPPED | OUTPATIENT
Start: 2021-08-09 | End: 2022-02-07

## 2021-09-03 RX ORDER — INSULIN DETEMIR 100 [IU]/ML
INJECTION, SOLUTION SUBCUTANEOUS
Qty: 45 ML | Refills: 0 | Status: SHIPPED | OUTPATIENT
Start: 2021-09-03 | End: 2021-11-15

## 2021-09-10 RX ORDER — ROSUVASTATIN CALCIUM 40 MG/1
40 TABLET, COATED ORAL DAILY
Qty: 90 TABLET | Refills: 9 | Status: SHIPPED | OUTPATIENT
Start: 2021-09-10 | End: 2022-09-27 | Stop reason: SDUPTHER

## 2021-10-15 ENCOUNTER — TELEPHONE (OUTPATIENT)
Dept: ENDOCRINOLOGY | Age: 41
End: 2021-10-15

## 2021-10-15 NOTE — TELEPHONE ENCOUNTER
Patient has stopped trulicity - doing well on victoza      Her dexcom is not adhering well - lots of bruising and bleeding - has switched sides and it still happens. She does use the tegaderm bandages as well.     Any suggestions?

## 2021-10-16 NOTE — TELEPHONE ENCOUNTER
Okay about Victoza.  We might have to have the Dexcom agents contact the patient to see if they can help her in this scenario.  Unfortunately if she is having bruising/bleeding and it is causing a lot of discomfort if she could stop using the Dexcom for now until the company gives her any other advices.

## 2021-10-22 RX ORDER — NORGESTIMATE AND ETHINYL ESTRADIOL 7DAYSX3 28
1 KIT ORAL DAILY
Qty: 84 TABLET | Refills: 0 | Status: SHIPPED | OUTPATIENT
Start: 2021-10-22 | End: 2022-01-19

## 2021-11-02 ENCOUNTER — OFFICE VISIT (OUTPATIENT)
Dept: FAMILY MEDICINE CLINIC | Facility: CLINIC | Age: 41
End: 2021-11-02

## 2021-11-02 VITALS
WEIGHT: 211 LBS | DIASTOLIC BLOOD PRESSURE: 92 MMHG | HEART RATE: 116 BPM | BODY MASS INDEX: 38.83 KG/M2 | OXYGEN SATURATION: 100 % | SYSTOLIC BLOOD PRESSURE: 144 MMHG | HEIGHT: 62 IN | RESPIRATION RATE: 16 BRPM

## 2021-11-02 DIAGNOSIS — R10.84 GENERALIZED ABDOMINAL PAIN: Primary | ICD-10-CM

## 2021-11-02 PROCEDURE — 99214 OFFICE O/P EST MOD 30 MIN: CPT | Performed by: NURSE PRACTITIONER

## 2021-11-02 NOTE — PROGRESS NOTES
Subjective   Cora Gutiérrez is a 41 y.o. female.   Abdominal Pain (more left side), Vomiting (since 1am), and Gas    History of Present Illness   Started yesterday at work and she had abdominal pain and vomiting. She continues to vomit. Her emesis is now a neon green and yellow.Has taken gas X,zofran and tums with no improvement.  She went to the ER last night but left because she sat in the waiting room for 3 hours    The following portions of the patient's history were reviewed and updated as appropriate: allergies, current medications, past family history, past medical history, past social history, past surgical history and problem list.    Review of Systems   Constitutional: Positive for activity change, appetite change and diaphoresis.   Respiratory: Positive for shortness of breath.    Gastrointestinal: Positive for abdominal distention, abdominal pain, nausea and vomiting.   Neurological: Negative for dizziness.       Objective   Physical Exam  Vitals reviewed.   HENT:      Head: Normocephalic and atraumatic.   Cardiovascular:      Rate and Rhythm: Tachycardia present.   Pulmonary:      Effort: Pulmonary effort is normal.   Abdominal:      Comments: No bowel sounds auscutated.  + pain with slight palpation to left middle and upper quadrant and across top of abdomen to the right upper quadrant   Skin:     General: Skin is warm.           Assessment/Plan   Problem List Items Addressed This Visit     None      Visit Diagnoses     Generalized abdominal pain    -  Primary        Stent to [facility] where she had gone last night       Return if symptoms worsen or fail to improve.

## 2021-11-15 ENCOUNTER — OFFICE VISIT (OUTPATIENT)
Dept: ENDOCRINOLOGY | Age: 41
End: 2021-11-15

## 2021-11-15 VITALS
HEART RATE: 81 BPM | DIASTOLIC BLOOD PRESSURE: 70 MMHG | HEIGHT: 63 IN | WEIGHT: 210.2 LBS | SYSTOLIC BLOOD PRESSURE: 110 MMHG | OXYGEN SATURATION: 99 % | BODY MASS INDEX: 37.25 KG/M2

## 2021-11-15 DIAGNOSIS — Z79.4 LONG-TERM INSULIN USE (HCC): Primary | ICD-10-CM

## 2021-11-15 DIAGNOSIS — E11.65 TYPE 2 DIABETES MELLITUS WITH HYPERGLYCEMIA, WITH LONG-TERM CURRENT USE OF INSULIN (HCC): ICD-10-CM

## 2021-11-15 DIAGNOSIS — E66.9 OBESITY (BMI 35.0-39.9 WITHOUT COMORBIDITY): ICD-10-CM

## 2021-11-15 DIAGNOSIS — E03.9 ACQUIRED HYPOTHYROIDISM: ICD-10-CM

## 2021-11-15 DIAGNOSIS — Z79.4 TYPE 2 DIABETES MELLITUS WITH HYPERGLYCEMIA, WITH LONG-TERM CURRENT USE OF INSULIN (HCC): ICD-10-CM

## 2021-11-15 DIAGNOSIS — E78.2 HYPERLIPEMIA, MIXED: ICD-10-CM

## 2021-11-15 PROCEDURE — 95251 CONT GLUC MNTR ANALYSIS I&R: CPT | Performed by: INTERNAL MEDICINE

## 2021-11-15 PROCEDURE — 99215 OFFICE O/P EST HI 40 MIN: CPT | Performed by: INTERNAL MEDICINE

## 2021-11-15 RX ORDER — INSULIN GLARGINE 300 U/ML
60 INJECTION, SOLUTION SUBCUTANEOUS NIGHTLY
Qty: 6 ML | Refills: 11 | Status: SHIPPED | OUTPATIENT
Start: 2021-11-15 | End: 2021-12-08 | Stop reason: SDUPTHER

## 2021-11-15 RX ORDER — CHLORAL HYDRATE 500 MG
4 CAPSULE ORAL DAILY
COMMUNITY
Start: 2021-11-07 | End: 2022-02-02

## 2021-11-15 RX ORDER — INSULIN ASPART 100 [IU]/ML
20 INJECTION, SOLUTION INTRAVENOUS; SUBCUTANEOUS
Qty: 18 ML | Refills: 11 | Status: SHIPPED | OUTPATIENT
Start: 2021-11-15 | End: 2021-11-18 | Stop reason: SDUPTHER

## 2021-11-15 RX ORDER — OXYCODONE HYDROCHLORIDE 5 MG/1
5 TABLET ORAL
COMMUNITY
Start: 2021-11-06 | End: 2022-01-19

## 2021-11-15 RX ORDER — GEMFIBROZIL 600 MG/1
600 TABLET, FILM COATED ORAL
Qty: 60 TABLET | Refills: 11 | Status: SHIPPED | OUTPATIENT
Start: 2021-11-15 | End: 2021-11-18 | Stop reason: SDUPTHER

## 2021-11-15 RX ORDER — PANTOPRAZOLE SODIUM 40 MG/1
40 TABLET, DELAYED RELEASE ORAL DAILY
COMMUNITY
Start: 2021-11-06 | End: 2022-02-02

## 2021-11-15 NOTE — PROGRESS NOTES
"Chief Complaint  Chief Complaint   Patient presents with   • Diabetes   • Hypothyroidism   FOLLOW UP/ TYPE 2 DM    Subjective          History of Present Illness    Cora Gutiérrez 41 y.o. presents with Type 2 dm, hypertriglyceridemia, recently hospitalized with pancreatitis is here for the follow-up    Type 2 dm - Diagnosed about 5 - 10 years ago.   Today in clinic pt reports being on Levemir 50 units at bedtime, NovoLog 15 units with each meals, Xigduo XR 1 tablet twice daily.  Trulicity was discontinued after the hospitalization.  Average blood sugars-195 mg/dL range  Checks BG -4-5 times  Sensor -Dexcom  Dm retinopathy -no known history,Last eye exam -in the last 1 year  Dm nephropathy -no  Dm neuropathy -no,Dm neuropathy meds -not on meds  CAD -no  CVA -no  Episodes of hypoglycemia -none  Pt is physically active. weight has been stable.   Pt tries to follow DM diet for most part.   On Ace inb.    Hypertriglyceridemia  On fish oil 4 tablets a day, has an upcoming appointment with the gastroenterologist.  She had to undergo plasmapheresis to help with the significantly elevated triglyceride levels.      Reviewed primary care physician's/consulting physician documentation and lab results         I have reviewed the patient's allergies, medicines, past medical hx, family hx and social hx in detail.    Objective   Vital Signs:   /70   Pulse 81   Ht 160 cm (63\")   Wt 95.3 kg (210 lb 3.2 oz)   SpO2 99%   BMI 37.24 kg/m²   Physical Exam   General appearance - no distress  Eyes- anicteric sclera  Ear nose and throat-external ears and nose normal.    Respiratory-normal chest on inspection.  No respiratory distress noted.  Skin-no rashes.  Neuro-alert and oriented x3          Result Review :   The following data was reviewed by: Fazal Jaramillo MD on 11/15/2021:  Results Encounter on 02/20/2021   Component Date Value Ref Range Status   • Hemoglobin A1C 07/22/2021 9.2* 4.8 - 5.6 % Final    Comment:          " Prediabetes: 5.7 - 6.4           Diabetes: >6.4           Glycemic control for adults with diabetes: <7.0     • Creatinine 07/22/2021 0.81  0.57 - 1.00 mg/dL Final   • eGFR Non  Am 07/22/2021 91  >59 mL/min/1.73 Final   • eGFR African Am 07/22/2021 105  >59 mL/min/1.73 Final    Comment: **[facility] currently reports eGFR in compliance with the current**    recommendations of the National Kidney Foundation. [facility] will    update reporting as new guidelines are published from the NKF-ASN    Task force.     • Total Cholesterol 07/22/2021 164  100 - 199 mg/dL Final   • Triglycerides 07/22/2021 371* 0 - 149 mg/dL Final   • HDL Cholesterol 07/22/2021 34* >39 mg/dL Final   • VLDL Cholesterol Dionte 07/22/2021 59* 5 - 40 mg/dL Final   • LDL Chol Calc ([facility]) 07/22/2021 71  0 - 99 mg/dL Final   • Creatinine, Urine 07/22/2021 107.3  Not Estab. mg/dL Final   • Microalbumin, Urine 07/22/2021 280.3  Not Estab. ug/mL Final   • Microalbumin/Creatinine Ratio 07/22/2021 261* 0 - 29 mg/g creat Final    Comment:                        Normal:                0 -  29                         Moderately increased: 30 - 300                         Severely increased:       >300     • TSH 07/22/2021 4.290  0.450 - 4.500 uIU/mL Final   • Vitamin B-12 07/22/2021 354  232 - 1,245 pg/mL Final   • Folate 07/22/2021 15.1  >3.0 ng/mL Final    Comment: A serum folate concentration of less than 3.1 ng/mL is  considered to represent clinical deficiency.     • 25 Hydroxy, Vitamin D 07/22/2021 16.1* 30.0 - 100.0 ng/mL Final    Comment: Vitamin D deficiency has been defined by the [place] of  Medicine and an Endocrine Society practice guideline as a  level of serum 25-OH vitamin D less than 20 ng/mL (1,2).  The Endocrine Society went on to further define vitamin D  insufficiency as a level between 21 and 29 ng/mL (2).  1. IOM ([place] of [employer]). 2010. Dietary reference     intakes for calcium and D. [facility]: The     National Academies  Press.  2. hSey MF, Reynaldo QUINTANILLA, Ciera ORTA, et al.     Evaluation, treatment, and prevention of vitamin D     deficiency: an Endocrine Society clinical practice     guideline. JCEM. 2011 Jul; 96(7):1911-30.     • Free T4 07/22/2021 1.29  0.82 - 1.77 ng/dL Final   • Interpretation 07/22/2021 Note   Final    Supplemental report is available.     Data reviewed: Hospital documentation       Results Review:    I reviewed the patient's new clinical results.                      Assessment and Plan    Problem List Items Addressed This Visit        Other    Acquired hypothyroidism    Relevant Orders    Basic Metabolic Panel    Hemoglobin A1c    Lipid Panel    Microalbumin / Creatinine Urine Ratio - Urine, Clean Catch    TSH    T4, Free    Vitamin D 25 Hydroxy      Other Visit Diagnoses     Long-term insulin use (HCC)    -  Primary    Relevant Orders    Basic Metabolic Panel    Hemoglobin A1c    Lipid Panel    Microalbumin / Creatinine Urine Ratio - Urine, Clean Catch    TSH    T4, Free    Vitamin D 25 Hydroxy    Type 2 diabetes mellitus with hyperglycemia, with long-term current use of insulin (HCC)        Relevant Medications    Insulin Glargine, 1 Unit Dial, (Toujeo SoloStar) 300 UNIT/ML solution pen-injector injection    insulin aspart (NovoLOG FlexPen) 100 UNIT/ML solution pen-injector sc pen    Other Relevant Orders    Basic Metabolic Panel    Hemoglobin A1c    Lipid Panel    Microalbumin / Creatinine Urine Ratio - Urine, Clean Catch    TSH    T4, Free    Vitamin D 25 Hydroxy    Obesity (BMI 35.0-39.9 without comorbidity)        Relevant Orders    Basic Metabolic Panel    Hemoglobin A1c    Lipid Panel    Microalbumin / Creatinine Urine Ratio - Urine, Clean Catch    TSH    T4, Free    Vitamin D 25 Hydroxy    Hyperlipemia, mixed        Relevant Medications    gemfibrozil (Lopid) 600 MG tablet    Other Relevant Orders    Basic Metabolic Panel    Hemoglobin A1c    Lipid Panel    Microalbumin / Creatinine Urine  "Ratio - Urine, Clean Catch    TSH    T4, Free    Vitamin D 25 Hydroxy        Type 2 diabetes mellitus-severely uncontrolled with hyperglycemia  HbA1c is significantly worse.  Interpreted the information of the CGM after downloading it and made the current insulin changes.  Avg bg -195  Trends noted -elevated blood sugars at dinnertime and later on in the night    Changes to the treatment plan  Toujeo 60 units at bed time.   Novolog 18/18/25 with BF/L/D    Novolog sliding scale with each meal  BG less than 150 - zero  151 - 200 - 3 unit  201 - 250 - 6 units  251 - 300 - 9 units  301 - 350 - 12 units  Above 350 mg/dl - 15 units.     Hypertriglyceridemia  Significantly worse  Start gemfibrozil 1 tablet 2 times a day  Continue fish oil    I spent 54 minutes caring for Cora on this date of service. This time includes time spent by me in the following activities:preparing for the visit, reviewing tests, obtaining and/or reviewing a separately obtained history, counseling and educating the patient/family/caregiver, ordering medications, tests, or procedures, documenting information in the medical record, independently interpreting results and communicating that information with the patient/family/caregiver, care coordination and Discussed with the patient the importance of glycemic control, triglyceride improvement to help with the prevention of the future attacks, also felt in the FMLA paperwork          Interpreted the blood work-up/imaging results performed by the primary care/consulting physician -    Refills sent to pharmacy    Follow Up     Patient was given instructions and counseling regarding her condition or for health maintenance advice. Please see specific information pulled into the AVS if appropriate.       Thank you for asking me to see your patient, Cora Gutiérrez in consultation.         Fazal Jaramillo MD  11/15/21      EMR Dragon / transcription disclaimer:     \"Dictated utilizing Dragon dictation\". "

## 2021-11-18 RX ORDER — GEMFIBROZIL 600 MG/1
600 TABLET, FILM COATED ORAL
Qty: 60 TABLET | Refills: 11 | Status: SHIPPED | OUTPATIENT
Start: 2021-11-18 | End: 2021-12-08 | Stop reason: SDUPTHER

## 2021-11-18 RX ORDER — INSULIN ASPART 100 [IU]/ML
20 INJECTION, SOLUTION INTRAVENOUS; SUBCUTANEOUS
Qty: 45 ML | Refills: 11 | Status: SHIPPED | OUTPATIENT
Start: 2021-11-18 | End: 2022-02-02

## 2021-11-30 ENCOUNTER — TELEPHONE (OUTPATIENT)
Dept: ENDOCRINOLOGY | Age: 41
End: 2021-11-30

## 2021-11-30 RX ORDER — DAPAGLIFLOZIN AND METFORMIN HYDROCHLORIDE 5; 1000 MG/1; MG/1
TABLET, FILM COATED, EXTENDED RELEASE ORAL
Qty: 60 TABLET | Refills: 3 | Status: SHIPPED | OUTPATIENT
Start: 2021-11-30 | End: 2022-04-13

## 2021-12-02 NOTE — TELEPHONE ENCOUNTER
Insurance will not cover toujeo can she use tresiba it is covered      Left message for patient at      Telephone Information:   Mobile [phone]    to schedule procedure.  Patient to return call to [facility] Scheduling Patient Line [phone].

## 2021-12-08 RX ORDER — INSULIN GLARGINE 300 U/ML
60 INJECTION, SOLUTION SUBCUTANEOUS NIGHTLY
Qty: 6 ML | Refills: 11 | Status: SHIPPED | OUTPATIENT
Start: 2021-12-08 | End: 2022-01-19

## 2021-12-08 RX ORDER — GEMFIBROZIL 600 MG/1
600 TABLET, FILM COATED ORAL
Qty: 60 TABLET | Refills: 11 | Status: SHIPPED | OUTPATIENT
Start: 2021-12-08 | End: 2022-01-19 | Stop reason: ALTCHOICE

## 2021-12-09 RX ORDER — BLOOD SUGAR DIAGNOSTIC
STRIP MISCELLANEOUS
Qty: 100 EACH | Refills: 12 | Status: SHIPPED | OUTPATIENT
Start: 2021-12-09

## 2021-12-13 ENCOUNTER — TELEPHONE (OUTPATIENT)
Dept: ENDOCRINOLOGY | Age: 41
End: 2021-12-13

## 2021-12-13 RX ORDER — INSULIN GLARGINE 100 [IU]/ML
60 INJECTION, SOLUTION SUBCUTANEOUS NIGHTLY
Qty: 45 ML | Refills: 3 | Status: SHIPPED | OUTPATIENT
Start: 2021-12-13 | End: 2022-10-27

## 2021-12-13 RX ORDER — FENOFIBRATE 145 MG/1
145 TABLET, COATED ORAL DAILY
Qty: 90 TABLET | Refills: 0 | Status: SHIPPED | OUTPATIENT
Start: 2021-12-13 | End: 2022-03-01

## 2021-12-13 NOTE — TELEPHONE ENCOUNTER
Patient called needing toujeo & gemfibrizil filled but said that pharmacy said the gemfibrizil might not react well with her other medications. (rosouvastatin)  Also her insurance doesn't cover toujeo without the medical necessity form. marty prior authorization [phone]     Patient would like a call back

## 2021-12-13 NOTE — TELEPHONE ENCOUNTER
Brandi can you take care of Toujeo, if not even Basaglar is okay or any other long-acting insulin which ever is covered by her insurance.  Instead of the gemfibrozil we could consider doing fenofibrate which will not have interactions with rosuvastatin.  After talking to the patient send in the prescription of fenofibrate 145 mg 1 tablet a day.

## 2021-12-17 ENCOUNTER — TELEPHONE (OUTPATIENT)
Dept: ENDOCRINOLOGY | Age: 41
End: 2021-12-17

## 2021-12-17 NOTE — TELEPHONE ENCOUNTER
Patient called    She picked up the Gemfibrozil but the pharmacist mentioned an interaction with her Crestor. She has gemfibrozil and fenofibrate. She is wondering which medication of the 2 she can take with her crestor.

## 2021-12-27 ENCOUNTER — TREATMENT (OUTPATIENT)
Dept: ENDOCRINOLOGY | Age: 41
End: 2021-12-27

## 2021-12-27 DIAGNOSIS — IMO0002 DM (DIABETES MELLITUS), TYPE 2, UNCONTROLLED W/NEUROLOGIC COMPLICATION: ICD-10-CM

## 2021-12-27 PROCEDURE — 95251 CONT GLUC MNTR ANALYSIS I&R: CPT | Performed by: INTERNAL MEDICINE

## 2022-01-06 RX ORDER — PROCHLORPERAZINE 25 MG/1
SUPPOSITORY RECTAL
Qty: 3 EACH | Refills: 3 | Status: SHIPPED | OUTPATIENT
Start: 2022-01-06 | End: 2022-05-17

## 2022-01-19 ENCOUNTER — LAB (OUTPATIENT)
Dept: ENDOCRINOLOGY | Age: 42
End: 2022-01-19

## 2022-01-19 ENCOUNTER — OFFICE VISIT (OUTPATIENT)
Dept: FAMILY MEDICINE CLINIC | Facility: CLINIC | Age: 42
End: 2022-01-19

## 2022-01-19 VITALS
SYSTOLIC BLOOD PRESSURE: 112 MMHG | WEIGHT: 214 LBS | HEART RATE: 88 BPM | DIASTOLIC BLOOD PRESSURE: 82 MMHG | RESPIRATION RATE: 14 BRPM | HEIGHT: 63 IN | OXYGEN SATURATION: 98 % | BODY MASS INDEX: 37.92 KG/M2

## 2022-01-19 DIAGNOSIS — E66.9 OBESITY (BMI 35.0-39.9 WITHOUT COMORBIDITY): ICD-10-CM

## 2022-01-19 DIAGNOSIS — Z79.4 LONG-TERM INSULIN USE: ICD-10-CM

## 2022-01-19 DIAGNOSIS — Z30.09 BIRTH CONTROL COUNSELING: Primary | ICD-10-CM

## 2022-01-19 DIAGNOSIS — E03.9 ACQUIRED HYPOTHYROIDISM: ICD-10-CM

## 2022-01-19 DIAGNOSIS — E78.2 HYPERLIPEMIA, MIXED: ICD-10-CM

## 2022-01-19 DIAGNOSIS — Z79.4 TYPE 2 DIABETES MELLITUS WITH HYPERGLYCEMIA, WITH LONG-TERM CURRENT USE OF INSULIN: ICD-10-CM

## 2022-01-19 DIAGNOSIS — E11.65 TYPE 2 DIABETES MELLITUS WITH HYPERGLYCEMIA, WITH LONG-TERM CURRENT USE OF INSULIN: ICD-10-CM

## 2022-01-19 PROCEDURE — 99213 OFFICE O/P EST LOW 20 MIN: CPT | Performed by: NURSE PRACTITIONER

## 2022-01-19 RX ORDER — NORGESTIMATE AND ETHINYL ESTRADIOL 7DAYSX3 28
KIT ORAL
Qty: 84 TABLET | Refills: 3 | Status: SHIPPED | OUTPATIENT
Start: 2022-01-19 | End: 2023-02-01 | Stop reason: SDUPTHER

## 2022-01-19 NOTE — PATIENT INSTRUCTIONS
Hormonal Contraception Information  Hormonal contraception is a type of birth control that uses hormones to prevent pregnancy. It usually involves a combination of the hormones estrogen and progesterone or only the hormone progesterone. Hormonal contraception works in these ways:  · It thickens the mucus in the cervix, which is the lowest part of the uterus. Thicker mucus makes it harder for sperm to enter the uterus.  · It changes the lining of the uterus, making it harder for an egg to attach or implant.  · It may stop the ovaries from releasing eggs (ovulation). Some women who take hormonal contraceptives that contain only progesterone may continue to ovulate.  Hormonal contraception cannot prevent STIs (sexually transmitted infections). Pregnancy may still occur.  Types of hormonal contraception    Estrogen and progesterone contraceptives  Contraceptives that use a combination of estrogen and progesterone are available in these forms:  · Pill. Pills come in different combinations of hormones. Pills must be taken at the same time each day. They can affect your period, causing you to get your period once every 3 months or not at all.  · Patch. The patch must be worn on the lower abdomen for 3 weeks and then removed on the fourth.  · Vaginal ring. The ring is placed in the vagina and left there for 3 weeks. It is then removed for 1 week.  Progesterone-only contraceptives  Contraceptives that use only progesterone are available in these forms:  · Pill. Pills should be taken every day of the cycle.  · Intrauterine device (IUD). This device is inserted into the uterus and removed or replaced every 5 years or sooner.  · Implant. Plastic rods are placed under the skin of the upper arm. They are removed or replaced every 3 years or sooner.  · Shot (injection). The injection is given once every 90 days.  Risks associated with hormonal contraception  Estrogen and progesterone contraceptives can sometimes cause side  effects, such as:  · Nausea.  · Headaches.  · Breast tenderness.  · Bleeding or spotting between menstrual cycles.  · High blood pressure (rare).  · Strokes, heart attacks, or blood clots (rare).  Progesterone-only contraceptives can also have side effects, such as:  · Nausea.  · Headaches.  · Breast tenderness.  · Irregular menstrual bleeding.  · High blood pressure (rare).  Talk to your health care provider about what side effects may affect you.  Questions to ask:  · What type of hormonal contraception is right for me?  · How long should I plan to use hormonal contraception?  · What are the side effects of the hormonal contraception method I choose?  · How can I prevent STIs while using hormonal contraception?  Where to find more information  Ask your health care provider for more information and resources about hormonal contraception. You can also go to:  · U.S. Department of Health and Human Services Office on Women's Health: www.womenshealth.gov  Contact a health care provider if:  · You start taking hormonal contraceptives and you develop long-lasting or severe side effects.  Summary  · Estrogen and progesterone are hormones used in many forms of birth control.  · Hormonal contraception cannot prevent STIs (sexually transmitted infections).  · Talk to your health care provider about what side effects may affect you.  · Ask your health care provider for more information and resources about hormonal contraception.  This information is not intended to replace advice given to you by your health care provider. Make sure you discuss any questions you have with your health care provider.  Document Revised: 11/30/2020 Document Reviewed: 11/30/2020  [employer] Patient Education © 2021 [employer]

## 2022-01-19 NOTE — PROGRESS NOTES
Subjective   Cora Gutiérrez is a 41 y.o. female.     History of Present Illness   Here for her 6 month check up  She needs her birth control  Had her labs drawn at the endocrine office this morning  Still has menstrual cycles and they last about a week.    The following portions of the patient's history were reviewed and updated as appropriate: allergies, current medications, past family history, past medical history, past social history, past surgical history and problem list.    Review of Systems   Constitutional: Negative for activity change, chills and fatigue.   Respiratory: Negative for cough.    Genitourinary: Negative for breast discharge, decreased urine volume, dyspareunia, hematuria and urinary incontinence.   Neurological: Negative for confusion.       Objective   Physical Exam  Vitals and nursing note reviewed.   Constitutional:       General: She is not in acute distress.     Appearance: She is well-developed.   HENT:      Head: Normocephalic and atraumatic.      Right Ear: External ear normal.      Left Ear: External ear normal.   Neck:      Thyroid: No thyromegaly.   Cardiovascular:      Rate and Rhythm: Normal rate and regular rhythm.      Heart sounds: Normal heart sounds.   Pulmonary:      Effort: Pulmonary effort is normal.      Breath sounds: Normal breath sounds.   Musculoskeletal:         General: Normal range of motion.      Cervical back: Neck supple.   Skin:     General: Skin is warm.   Neurological:      Mental Status: She is alert.           Assessment/Plan   Problem List Items Addressed This Visit     None      Visit Diagnoses     Birth control counseling    -  Primary               No follow-ups on file.

## 2022-01-20 LAB
25(OH)D3+25(OH)D2 SERPL-MCNC: 15.4 NG/ML (ref 30–100)
ALBUMIN/CREAT UR: 7 MG/G CREAT (ref 0–29)
BUN SERPL-MCNC: 14 MG/DL (ref 6–24)
BUN/CREAT SERPL: 16 (ref 9–23)
CALCIUM SERPL-MCNC: 9.3 MG/DL (ref 8.7–10.2)
CHLORIDE SERPL-SCNC: 102 MMOL/L (ref 96–106)
CHOLEST SERPL-MCNC: 165 MG/DL (ref 100–199)
CO2 SERPL-SCNC: 20 MMOL/L (ref 20–29)
CREAT SERPL-MCNC: 0.85 MG/DL (ref 0.57–1)
CREAT UR-MCNC: 71.8 MG/DL
GLUCOSE SERPL-MCNC: 118 MG/DL (ref 65–99)
HBA1C MFR BLD: 7.9 % (ref 4.8–5.6)
HDLC SERPL-MCNC: 41 MG/DL
IMP & REVIEW OF LAB RESULTS: NORMAL
LDLC SERPL CALC-MCNC: 85 MG/DL (ref 0–99)
MICROALBUMIN UR-MCNC: 5.1 UG/ML
POTASSIUM SERPL-SCNC: 4.5 MMOL/L (ref 3.5–5.2)
SODIUM SERPL-SCNC: 138 MMOL/L (ref 134–144)
T4 FREE SERPL-MCNC: 1.48 NG/DL (ref 0.82–1.77)
TRIGL SERPL-MCNC: 233 MG/DL (ref 0–149)
TSH SERPL DL<=0.005 MIU/L-ACNC: 0.87 UIU/ML (ref 0.45–4.5)
VLDLC SERPL CALC-MCNC: 39 MG/DL (ref 5–40)

## 2022-01-31 ENCOUNTER — DOCUMENTATION (OUTPATIENT)
Dept: ENDOCRINOLOGY | Age: 42
End: 2022-01-31

## 2022-02-02 ENCOUNTER — OFFICE VISIT (OUTPATIENT)
Dept: ENDOCRINOLOGY | Age: 42
End: 2022-02-02

## 2022-02-02 ENCOUNTER — SPECIALTY PHARMACY (OUTPATIENT)
Dept: ENDOCRINOLOGY | Age: 42
End: 2022-02-02

## 2022-02-02 VITALS
OXYGEN SATURATION: 99 % | HEART RATE: 87 BPM | WEIGHT: 217.2 LBS | BODY MASS INDEX: 38.48 KG/M2 | HEIGHT: 63 IN | DIASTOLIC BLOOD PRESSURE: 71 MMHG | SYSTOLIC BLOOD PRESSURE: 121 MMHG

## 2022-02-02 DIAGNOSIS — E11.65 TYPE 2 DIABETES MELLITUS WITH HYPERGLYCEMIA, WITH LONG-TERM CURRENT USE OF INSULIN: Primary | ICD-10-CM

## 2022-02-02 DIAGNOSIS — E78.2 HYPERLIPEMIA, MIXED: ICD-10-CM

## 2022-02-02 DIAGNOSIS — Z79.4 TYPE 2 DIABETES MELLITUS WITH HYPERGLYCEMIA, WITH LONG-TERM CURRENT USE OF INSULIN: Primary | ICD-10-CM

## 2022-02-02 DIAGNOSIS — E03.9 ACQUIRED HYPOTHYROIDISM: ICD-10-CM

## 2022-02-02 PROCEDURE — 99214 OFFICE O/P EST MOD 30 MIN: CPT | Performed by: NURSE PRACTITIONER

## 2022-02-02 PROCEDURE — 95251 CONT GLUC MNTR ANALYSIS I&R: CPT | Performed by: NURSE PRACTITIONER

## 2022-02-02 RX ORDER — ICOSAPENT ETHYL 1000 MG/1
2 CAPSULE ORAL 2 TIMES DAILY WITH MEALS
Qty: 360 CAPSULE | Refills: 1 | Status: SHIPPED | OUTPATIENT
Start: 2022-02-02 | End: 2022-07-21 | Stop reason: SDUPTHER

## 2022-02-02 RX ORDER — FAMOTIDINE 40 MG/1
40 TABLET, FILM COATED ORAL EVERY EVENING
COMMUNITY
Start: 2022-01-31

## 2022-02-02 RX ORDER — INSULIN ASPART 100 [IU]/ML
INJECTION, SOLUTION INTRAVENOUS; SUBCUTANEOUS
Qty: 45 ML | Refills: 0
Start: 2022-02-02 | End: 2022-05-09

## 2022-02-02 RX ORDER — OMEGA-3-ACID ETHYL ESTERS 1 G/1
2 CAPSULE, LIQUID FILLED ORAL 2 TIMES DAILY
COMMUNITY
Start: 2021-11-06 | End: 2022-02-02

## 2022-02-02 NOTE — PROGRESS NOTES
Specialty Pharmacy Patient Management Program  Endocrinology Initial Assessment     Cora Gutiérrez is a 41 y.o. female with Type 2 Diabetes and Hyperlipidemia seen by an Endocrinology provider and enrolled in the Endocrinology Patient Management program offered by [facility].  An initial outreach was conducted, including assessment of therapy appropriateness and specialty medication education for Vascepa (icosapent ethyl).  The patient was introduced to services offered by [facility], including: regular assessments, refill coordination, curbside pick-up or mail order delivery options, prior authorization maintenance, and financial assistance programs as applicable. The patient was also provided with contact information for the pharmacy team.     Insurance Coverage & Financial Support  [facility]  / [employer] (Vascepa copay card)     Relevant Past Medical History and Comorbidities  Relevant medical history and concomitant health conditions were discussed with the patient. The patient's chart has been reviewed for relevant past medical history and comorbid health conditions and updated as necessary.   Past Medical History:   Diagnosis Date   • Diabetes mellitus (HCC)    • Fatty liver    • H/O Pancreatitis, acute    • Hyperlipidemia    • Hypothyroidism    • Left ovarian cyst    • Type 2 diabetes mellitus (HCC)      Social History     Socioeconomic History   • Marital status:    Tobacco Use   • Smoking status: Smoker, Current Status Unknown     Packs/day: 0.50     Types: Electronic Cigarette   • Smokeless tobacco: Never Used   • Tobacco comment: quit cigarrettes 4/01/19   Substance and Sexual Activity   • Alcohol use: Yes     Comment: 1 every 3 months    • Drug use: No   • Sexual activity: Yes     Partners: Male     Birth control/protection: OCP       Problem list reviewed by Laura Gupta RPH on 2/2/2022 at  2:36 PM    Allergies  Known allergies and reactions were  discussed with the patient. The patient's chart has been reviewed for  allergy information and updated as necessary.   Trulicity [dulaglutide]    Allergies reviewed by Laura Gupta [facility] on 2/2/2022 at  2:35 PM    Current Medication List  This medication list has been reviewed with the patient and evaluated for any interactions or necessary modifications/recommendations, and updated to include all prescription medications, OTC medications, and supplements the patient is currently taking.  This list reflects what is contained in the patient's profile, which has also been marked as reviewed to communicate to other providers it is the most up to date version of the patient's current medication therapy.     Current Outpatient Medications:   •  Continuous Blood Gluc Sensor (Dexcom G6 Sensor), USE EVERY 10 DAYS, Disp: 3 each, Rfl: 3  •  Continuous Blood Gluc Transmit (Dexcom G6 Transmitter) misc, 1 Device Every 3 (Three) Months., Disp: 1 each, Rfl: 4  •  escitalopram (LEXAPRO) 10 MG tablet, TAKE ONE TABLET BY MOUTH DAILY, Disp: 90 tablet, Rfl: 1  •  famotidine (PEPCID) 40 MG tablet, Take 40 mg by mouth Every Evening., Disp: , Rfl:   •  fenofibrate (Tricor) 145 MG tablet, Take 1 tablet by mouth Daily., Disp: 90 tablet, Rfl: 0  •  glucose blood (Accu-Chek SmartView) test strip, Check 4 times daily Use as instructed, Disp: 100 each, Rfl: 12  •  insulin aspart (NovoLOG FlexPen) 100 UNIT/ML solution pen-injector sc pen, 18u B/L, 25u D, Disp: 45 mL, Rfl: 0  •  Insulin Glargine (BASAGLAR KWIKPEN) 100 UNIT/ML injection pen, Inject 60 Units under the skin into the appropriate area as directed Every Night., Disp: 45 mL, Rfl: 3  •  Insulin Pen Needle (BD Pen Needle Gina U/F) 32G X 4 MM misc, USE FIVE TIMES DAILY WITH INSULIN, Disp: 500 each, Rfl: 0  •  lidocaine (LIDODERM) 5 %, Place 1 patch on the skin as directed by provider Daily As Needed (back pain)., Disp: , Rfl:   •  ondansetron ODT (ZOFRAN ODT) 8 MG disintegrating tablet,  Take 1 tablet by mouth Every 8 (Eight) Hours As Needed for Nausea or Vomiting., Disp: 21 tablet, Rfl: 0  •  rosuvastatin (CRESTOR) 40 MG tablet, TAKE ONE TABLET BY MOUTH DAILY, Disp: 90 tablet, Rfl: 9  •  Synthroid 150 MCG tablet, TAKE 1 TABLET DAILY., Disp: 30 tablet, Rfl: 5  •  Tri-Sprintec 0.18/0.215/0.25 MG-35 MCG per tablet, TAKE ONE TABLET BY MOUTH DAILY NEEDS APPOINTMENT FOR MORE REFILLS, Disp: 84 tablet, Rfl: 3  •  Vascepa 1 g capsule capsule, Take 2 g by mouth 2 (Two) Times a Day With Meals., Disp: 360 capsule, Rfl: 1  •  Xigduo XR 5-1000 MG tablet, TAKE ONE TABLET BY MOUTH TWICE A DAY (Patient taking differently: Take 2 tablets by mouth Every Night.), Disp: 60 tablet, Rfl: 3    Medicines reviewed by Laura Gupta RP on 2/2/2022 at  2:36 PM    Drug Interactions  Rosuvastatin and fenofibrate    Recommended Medications Assessment  • Aspirin - Not Indicated  • Statin - Currently Taking  • ACEi/ARB - Not Indicated    Relevant Laboratory Values  A1C Last 3 Results    HGBA1C Last 3 Results 11/2/21 11/3/21 1/19/22   Hemoglobin A1C 9.5 (A) 9.2 (A) 7.9 (A)   (A) Abnormal value       Comments are available for some flowsheets but are not being displayed.           Lab Results   Component Value Date    HGBA1C 7.9 (H) 01/19/2022     Lab Results   Component Value Date    GLUCOSE 118 (H) 01/19/2022    CALCIUM 9.3 01/19/2022     01/19/2022    K 4.5 01/19/2022    CO2 20 01/19/2022     01/19/2022    BUN 14 01/19/2022    CREATININE 0.85 01/19/2022    EGFRIFAFRI 99 01/19/2022    EGFRIFNONA 86 01/19/2022    BCR 16 01/19/2022     Lab Results   Component Value Date    CHLPL 165 01/19/2022    TRIG 233 (H) 01/19/2022    HDL 41 01/19/2022    LDL 85 01/19/2022     Microalbumin    Microalbumin 7/22/21 1/19/22   Microalbumin, Urine 280.3 5.1             Initial Education Provided for Specialty Medication  The patient has been provided with the following education and any applicable administration techniques (i.e.  self-injection) have been demonstrated for the therapies indicated. All questions and concerns have been addressed prior to the patient receiving the medication, and the patient has verbalized understanding of the education and any materials provided.  Additional patient education shall be provided and documented upon request by the patient, provider or payer.      VASCEPA® (icosapent ethyl)  Medication Expectations   Why am I taking this medication? You are taking this medication to help lower the level of a type of fat called triglycerides in your blood.  This medication also helps reduce the risk of heart attack or stroke causing hospitalization in patients with:  - Very high triglycerides   - Cardiovascular disease  - Diabetes and additional risk factors for heart disease    What should I expect while on this medication? It is reasonable to expect your triglyceride level to decrease.  This medication may be added to other medications that reduce cholesterol levels (statins).    How does the medication work? Vascepa works by lowering the amount of triglycerides made in the liver. It can also help to remove triglycerides from your blood.    How long will I be on this medication for? The amount of time you will be on this medication will be determined by your doctor. It is possible you will be on this medication or a similar medication another throughout your life. Do not abruptly stop this or any medication without talking to your doctor first.   How do I take this medication? Take as directed on your prescription label.  This medication is usually taken twice a day with food. You should take Vascepa capsules whole. Do not break, open, crush, dissolve, or chew Vascepa.    What are some possible side effects? You may experience increased risk of bleeding when taking Vascepa. You should monitor for signs of bleeding such as bruising, blood in your urine or stool, nosebleeds without a known cause, or bleeding that  won't stop from a cut or injury.  This risk is increased if you take medications that affect blood clotting (anti-platelets or blood thinners). Your doctor will want to monitor you for signs of bleeding. Other side effects could include irregular heartbeat, joint pain, constipation, and dizziness. You should call your doctor if you notice any of these side effects.     What happens if I miss a dose? If you miss a dose, take it as soon as you remember. If it is close to your next dose, skip it (do not take 2 doses at once)     Medication Safety   What are things I should warn my doctor immediately about? Tell your doctor if you have an allergy or sensitivity to fish or shellfish. Stop taking Vascepa and tell your doctor right away or get emergency medical help if you have any signs or symptoms of an allergic reaction (rash, hives, difficulty breathing, swelling of the lips/tongue/face, etc.).  You should also tell your doctor if you have heart rhythm problems (a-fib or atrial flutter), have liver problems, or are experiencing the signs and symptoms of bleeding mentioned above.   What are things that I should be cautious or aware of? Be cautious of any side effects from this medication. Talk to your doctor if any new ones develop or aren't getting better. Vascepa should be used with a healthy diet and regular exercise.    What are some medications that can interact with this one? Some medications that can interact with Vascepa include medications that affect blood clotting (anti-platelets and blood thinners) and Ibrutinib.  Your doctor will monitor you closely for interactions and may adjust the dose of these medications to reduce the chance that they will interact with Vascepa. Always tell your doctor or pharmacist immediately if you start taking any new medications, including over-the-counter medications, vitamins, and herbal supplements.      Medication Storage/Handling   How should I handle this medication? Keep  this medication out of reach of pets/children in tightly sealed container.    How does this medication need to be stored? Store at room temperature and keep dry (don't keep in bathroom or other room that is humid or has a lot of moisture)    How should I dispose of this medication? There should not be a need to dispose of this medication unless your provider decides to change the dose or therapy. If that is the case, take to your local police station for proper disposal. Some pharmacies also have take-back bins for medication disposal.      Resources/Support   How can I remind myself to take this medication? You can download reminder apps to help you manage your refills. You may also set an alarm on your phone to remind you. The pharmacy carries pill boxes that you can place next to an area you pass everyday (such as where you place your car keys or where you charge your phone)   Is financial support available?  Ask your doctor or pharmacist if there are programs for financial support. The drug  ([employer] can also provide co-pay cards if you have commercial insurance or patient assistance if you have Medicare or no insurance.  Go to vascepa.com for details.    Which vaccines are recommended for me? Talk to your doctor about these vaccines that may be recommended for you : Flu, Coronavirus (COVID-19), Pneumococcal (pneumonia), Tdap, Hepatitis B, Zoster (shingles)        Adherence and Self-Administration  • Barriers to Patient Adherence and/or Self-Administration: None  • Methods for Supporting Patient Adherence and/or Self-Administration: None required     Goals of Therapy  Goals     • Consistently take medications as prescribed     • HEMOGLOBIN A1C < 7      HbA1c = 7.9% (01/19/22)      • Triglycerides < 150 mg/dL      TG = 233 mg/dL (01/19/22)  Improved from > 2000 mg/dL in Nov. 2021 during acute pancreatitis admission           Reassessment Plan & Follow-Up  1. Medication Therapy Changes: Start Vascepa    2. Additional Plans, Therapy Recommendations, or Therapy Problems to Be Addressed: Vascepa coupon added   3. Pharmacist to perform regular reassessments no more than (6) months from the previous assessment.  4. Care Coordinator to set up future refill outreaches, coordinate prescription delivery, and escalate clinical questions to pharmacist.     Attestation  I attest that the initiated specialty medication(s) are appropriate for the patient based on my assessment.  If the prescribed therapy is at any point deemed not appropriate based on the current or future assessments, a consultation will be initiated with the patient's specialty care provider to determine the best course of action. The revised plan of therapy will be documented along with any additional patient education provided.     Laura Gupta, Kanika, BCCP, BCPS   2/2/2022  14:49 EST

## 2022-02-02 NOTE — PROGRESS NOTES
"Chief Complaint  Diabetes Mellitus (follow up)    Subjective          Cora Gutiérrez presents to [facility] END[facility]  History of Present Illness     I have reviewed PMH, allergies and medications UTD at this visit     Forgets shot before dinner     Type 2 dm    Diagnosed about 5 - 10 years ago.   Today in clinic pt reports being on basaglar 60 units at bedtime, NovoLog 18B, 18l, 25 D, Xigduo XR 1 tablet twice daily   Trulicity was discontinued after the hospitalization.  Sensor -Dexcom  Average glucose 174  10% very high  34% high  56% time in range  0% low  GMI 7.5  Blood sugars are within range from 1 AM to 12 PM  Blood sugars at right outside of normal range from 12 PM to 7 PM  Blood sugars are highest from 7 PM to 12 AM when she forgets to take her dinner shot  Dm retinopathy -no known history, Last eye exam -2 years ago   Dm nephropathy -no  Dm neuropathy -no,Dm neuropathy meds -not on meds  CAD -no  CVA -no  Episodes of hypoglycemia -none  Pt is physically active. weight has been stable.   Pt tries to follow DM diet for most part.   On Ace inb.     Hypertriglyceridemia  She had to undergo plasmapheresis in the past to help with the significantly elevated triglyceride levels.  On fenofibrate and omega 3s  Lab Results   Component Value Date    CHLPL 165 01/19/2022    TRIG 233 (H) 01/19/2022    HDL 41 01/19/2022    LDL 85 01/19/2022         Hypothyroid  Synthroid 150mcg daily  Denies s/s of hyper or hypothyroidism  Lab Results   Component Value Date    TSH 0.866 01/19/2022         Objective   Vital Signs:   /71   Pulse 87   Ht 160 cm (63\")   Wt 98.5 kg (217 lb 3.2 oz)   SpO2 99%   BMI 38.48 kg/m²     Physical Exam  Vitals reviewed.   Constitutional:       General: She is not in acute distress.  HENT:      Head: Normocephalic and atraumatic.   Cardiovascular:      Rate and Rhythm: Normal rate and regular rhythm.   Pulmonary:      Effort: Pulmonary effort is normal. No " respiratory distress.   Musculoskeletal:         General: No signs of injury. Normal range of motion.      Cervical back: Normal range of motion and neck supple.   Skin:     General: Skin is warm and dry.   Neurological:      Mental Status: She is alert and oriented to person, place, and time. Mental status is at baseline.   Psychiatric:         Mood and Affect: Mood normal.         Behavior: Behavior normal.         Thought Content: Thought content normal.         Judgment: Judgment normal.          Result Review :   The following data was reviewed by: PORSHA Shaw on 02/02/2022:  Common labs    Common Labsle 11/5/21 11/5/21 11/6/21 11/6/21 1/19/22 1/19/22 1/19/22 1/19/22    0333 1231 0324 0726 0921 0921 0921 0921   Glucose     118 (A)      BUN     14      Creatinine     0.85      eGFR Non  Am     86      eGFR African Am     99      Sodium     138      Potassium  3.9   4.5      Chloride     102      Calcium     9.3      WBC    11.42 (A)       Hemoglobin    9.1 (A)       Hematocrit    27.7 (A)       Platelets    230       Total Cholesterol       165    Triglycerides 514 (A)  478 (A)    233 (A)    HDL Cholesterol       41    LDL Cholesterol        85    Hemoglobin A1C      7.9 (A)     Microalbumin, Urine        5.1   (A) Abnormal value       Comments are available for some flowsheets but are not being displayed.                     Assessment and Plan    Diagnoses and all orders for this visit:    1. Type 2 diabetes mellitus with hyperglycemia, with long-term current use of insulin (HCC) (Primary)  -     Hemoglobin A1c; Future  -     Basic Metabolic Panel; Future  -     Lipid Panel; Future  -     TSH; Future    2. Hyperlipemia, mixed  -     Vascepa 1 g capsule capsule; Take 2 g by mouth 2 (Two) Times a Day With Meals.  Dispense: 360 capsule; Refill: 1  -     Lipid Panel; Future    3. Acquired hypothyroidism  -     TSH; Future    Other orders  -     insulin aspart (NovoLOG FlexPen) 100 UNIT/ML solution  pen-injector sc pen; 18u B/L, 25u D  Dispense: 45 mL; Refill: 0        Follow Up   No follow-ups on file.     Try to get vascepa fill, triglycerides improving but still above target   Continue statin and fenofibrate   a1c nearing goal  Improve dinner time insulin dosing  Continue current basaglar dose at 60u and xigduo  Continue synthroid dose at 150mcg QHS   Diabetic eye exam  a1c improving but still above target range    Patient was given instructions and counseling regarding her condition or for health maintenance advice. Please see specific information pulled into the AVS if appropriate.     Thais Wheatley, APRN

## 2022-02-07 RX ORDER — LEVOTHYROXINE SODIUM 150 MCG
TABLET ORAL
Qty: 30 TABLET | Refills: 5 | Status: SHIPPED | OUTPATIENT
Start: 2022-02-07 | End: 2022-05-09

## 2022-02-08 ENCOUNTER — SPECIALTY PHARMACY (OUTPATIENT)
Dept: ENDOCRINOLOGY | Age: 42
End: 2022-02-08

## 2022-02-08 NOTE — PROGRESS NOTES
Specialty Pharmacy Refill Coordination Note     Cora is a 41 y.o. female contacted today regarding refills of  1 specialty medication(s).    Reviewed and verified with patient:       Specialty medication(s) and dose(s) confirmed: yes                   Follow-up: 1 month(s)     GREG EDWARDS  Specialty Pharmacy Technician

## 2022-02-16 ENCOUNTER — OFFICE VISIT (OUTPATIENT)
Dept: FAMILY MEDICINE CLINIC | Facility: CLINIC | Age: 42
End: 2022-02-16

## 2022-02-16 VITALS
SYSTOLIC BLOOD PRESSURE: 132 MMHG | HEIGHT: 63 IN | WEIGHT: 218 LBS | RESPIRATION RATE: 14 BRPM | DIASTOLIC BLOOD PRESSURE: 88 MMHG | OXYGEN SATURATION: 99 % | HEART RATE: 92 BPM | BODY MASS INDEX: 38.62 KG/M2

## 2022-02-16 DIAGNOSIS — Z12.4 CERVICAL CANCER SCREENING: Primary | ICD-10-CM

## 2022-02-16 DIAGNOSIS — Z12.4 PAP SMEAR FOR CERVICAL CANCER SCREENING: ICD-10-CM

## 2022-02-16 DIAGNOSIS — Z12.31 ENCOUNTER FOR SCREENING MAMMOGRAM FOR MALIGNANT NEOPLASM OF BREAST: ICD-10-CM

## 2022-02-16 PROCEDURE — 99396 PREV VISIT EST AGE 40-64: CPT | Performed by: NURSE PRACTITIONER

## 2022-02-16 NOTE — PROGRESS NOTES
Subjective   Cora TITI Gutiérrez is a 41 y.o. female.   PMHX:  PSHX  PFHX  Exercise  Diet;  Sleep hygiene:  Employment status:      History of Present Illness     {Common H&P Review Areas:60141}    Review of Systems    Objective   Physical Exam      Assessment/Plan   {Assess/PlanSmartLinks:83293}

## 2022-02-17 NOTE — PROGRESS NOTES
"Preventive Exam    History of Present Illness: Cora is here for check up and review of routine health maintenance. She states she is doing well and has no concerns  Her cycles are monthly and normal.  She thinks she has tested positive for HPV in the past but is unsure.    REVIEW OF SYSTEMS  Constitutional: Negative.    HENT: Negative.    Eyes: Negative.    Respiratory: Negative.    Cardiovascular: Negative.    Gastrointestinal: Negative.    Endocrine: Negative.    Genitourinary: Negative.    Musculoskeletal: Negative.  Skin: Negative.    Allergic/Immunologic: Negative.    Neurological: Negative.    Hematological: Negative.    Psychiatric/Behavioral: Negative.    All other systems reviewed and are negative.          PHYSICAL EXAM    Vitals:    02/16/22 1325   BP: 132/88   Pulse: 92   Resp: 14   SpO2: 99%   Weight: 98.9 kg (218 lb)   Height: 160 cm (63\")     GENERAL: alert and oriented, afebrile and vital signs stable  HEENT: oral mucosa moist, PEERLA, EOM, conjunctiva normal  No cervical adenopathy  LUNGS: clear to ascultation bilaterally, no rales, ronchi or wheezing  HEART: RRR S1 S2 without murmers, thrills, rubs or gallops  CHEST WALL: within normal limits, no tenderness  BREAST EXAM: No masses, skin changes, tenderness or discharge noted  ABDOMEN: WNL. Normal BS.  EXTREMITIES: No clubbing, cyanosis or edema noted. Normal Pulses.  SKIN: warm, dry, no rashes noted  NEURO: CN II- XII grossly intact    ASSESSMENT AND PLAN  Problems Addressed this Visit     None      Visit Diagnoses     Cervical cancer screening    -  Primary    Relevant Orders    IGP, Aptima HPV, Rfx 16 / 18,45    Encounter for screening mammogram for malignant neoplasm of breast        Relevant Orders    Mammo Screening Bilateral With CAD      Diagnoses       Codes Comments    Cervical cancer screening    -  Primary ICD-10-CM: Z12.4  ICD-9-CM: V76.2     Encounter for screening mammogram for malignant neoplasm of breast     ICD-10-CM: " Z12.31  ICD-9-CM: V76.12         Routine health maintenance reviewed and discussed with Cora.    1.Well woman exam today. Your pap smear results are pending, HPV test is pending. We will notify you with results which may take up to a week to return. Your pelvic exam is normal.   2. Breast exam is normal.  3. Cholesterol, complete metabolic panel  and complete blood count has been checked today and we will notify  you of  Results.        .  Orders Placed This Encounter   Procedures   • Mammo Screening Bilateral With CAD     Standing Status:   Future     Order Specific Question:   Reason for Exam:     Answer:   Breast cancer screening     Order Specific Question:   Patient Pregnant     Answer:   No     No follow-ups on file.

## 2022-02-17 NOTE — PATIENT INSTRUCTIONS
Pap Test  Why am I having this test?  A Pap test, also called a Pap smear, is a screening test to check for signs of:  · Cancer of the vagina, cervix, and uterus. The cervix is the lower part of the uterus that opens into the vagina.  · Infection.  · Changes that may be a sign that cancer is developing (precancerous changes).  Women need this test on a regular basis. In general, you should have a Pap test every 3 years until you reach menopause or age 65. Women aged 30-60 may choose to have their Pap test done at the same time as an HPV (human papillomavirus) test every 5 years (instead of every 3 years).  Your health care provider may recommend having Pap tests more or less often depending on your medical conditions and past Pap test results.  What kind of sample is taken?    Your health care provider will collect a sample of cells from the surface of your cervix. This will be done using a small cotton swab, plastic spatula, or brush. This sample is often collected during a pelvic exam, when you are lying on your back on an exam table with feet in footrests (stirrups).  In some cases, fluids (secretions) from the cervix or vagina may also be collected.  How do I prepare for this test?  · Be aware of where you are in your menstrual cycle. If you are menstruating on the day of the test, you may be asked to reschedule.  · You may need to reschedule if you have a known vaginal infection on the day of the test.  · Follow instructions from your health care provider about:  ? Changing or stopping your regular medicines. Some medicines can cause abnormal test results, such as digitalis and tetracycline.  ? Avoiding douching or taking a bath the day before or the day of the test.  Tell a health care provider about:  · Any allergies you have.  · All medicines you are taking, including vitamins, herbs, eye drops, creams, and over-the-counter medicines.  · Any blood disorders you have.  · Any surgeries you have had.  · Any  medical conditions you have.  · Whether you are pregnant or may be pregnant.  How are the results reported?  Your test results will be reported as either abnormal or normal.  A false-positive result can occur. A false positive is incorrect because it means that a condition is present when it is not.  A false-negative result can occur. A false negative is incorrect because it means that a condition is not present when it is.  What do the results mean?  A normal test result means that you do not have signs of cancer of the vagina, cervix, or uterus.  An abnormal result may mean that you have:  · Cancer. A Pap test by itself is not enough to diagnose cancer. You will have more tests done in this case.  · Precancerous changes in your vagina, cervix, or uterus.  · Inflammation of the cervix.  · An STD (sexually transmitted disease).  · A fungal infection.  · A parasite infection.  Talk with your health care provider about what your results mean.  Questions to ask your health care provider  Ask your health care provider, or the department that is doing the test:  · When will my results be ready?  · How will I get my results?  · What are my treatment options?  · What other tests do I need?  · What are my next steps?  Summary  · In general, women should have a Pap test every 3 years until they reach menopause or age 65.  · Your health care provider will collect a sample of cells from the surface of your cervix. This will be done using a small cotton swab, plastic spatula, or brush.  · In some cases, fluids (secretions) from the cervix or vagina may also be collected.  This information is not intended to replace advice given to you by your health care provider. Make sure you discuss any questions you have with your health care provider.  Document Revised: 08/27/2018 Document Reviewed: 08/27/2018  Elsevier Patient Education © 2021 [employer]     yes

## 2022-02-21 ENCOUNTER — TRANSCRIBE ORDERS (OUTPATIENT)
Dept: ADMINISTRATIVE | Facility: HOSPITAL | Age: 42
End: 2022-02-21

## 2022-02-21 DIAGNOSIS — Z12.31 SCREENING MAMMOGRAM, ENCOUNTER FOR: Primary | ICD-10-CM

## 2022-02-22 LAB
CYTOLOGIST CVX/VAG CYTO: NORMAL
CYTOLOGY CVX/VAG DOC CYTO: NORMAL
CYTOLOGY CVX/VAG DOC THIN PREP: NORMAL
DX ICD CODE: NORMAL
HIV 1 & 2 AB SER-IMP: NORMAL
HPV I/H RISK 4 DNA CVX QL PROBE+SIG AMP: NEGATIVE
Lab: NORMAL
OTHER STN SPEC: NORMAL
STAT OF ADQ CVX/VAG CYTO-IMP: NORMAL

## 2022-02-26 DIAGNOSIS — F32.A DEPRESSION, UNSPECIFIED DEPRESSION TYPE: ICD-10-CM

## 2022-02-28 RX ORDER — ESCITALOPRAM OXALATE 10 MG/1
TABLET ORAL
Qty: 90 TABLET | Refills: 3 | Status: SHIPPED | OUTPATIENT
Start: 2022-02-28 | End: 2022-11-30 | Stop reason: SDUPTHER

## 2022-03-01 ENCOUNTER — SPECIALTY PHARMACY (OUTPATIENT)
Dept: ENDOCRINOLOGY | Age: 42
End: 2022-03-01

## 2022-03-01 RX ORDER — FENOFIBRATE 145 MG/1
TABLET, COATED ORAL
Qty: 90 TABLET | Refills: 0 | Status: SHIPPED | OUTPATIENT
Start: 2022-03-01 | End: 2022-05-31

## 2022-03-01 NOTE — PROGRESS NOTES
Specialty Pharmacy Refill Coordination Note     Cora is a 42 y.o. female contacted today regarding refills of  1 specialty medication(s).    Reviewed and verified with patient:       Specialty medication(s) and dose(s) confirmed: yes    Refill Questions      Most Recent Value   Changes to allergies? No   Changes to medications? No   New conditions since last clinic visit No   Unplanned office visit, urgent care, ED, or hospital admission in the last 4 weeks  No   How does patient/caregiver feel medication is working? Very good   Financial problems or insurance changes  No   If yes, describe changes in insurance or financial issues. n/a   How many doses of your specialty medications were missed in the last 4 weeks? 0   Why were doses missed? n/a   Does this patient require a clinical escalation to a pharmacist? No          Delivery Questions      Most Recent Value   Delivery method FedEx   Delivery address correct? Yes   Delivery phone number [phone]   Preferred delivery time? Anytime   Number of medications in delivery 1   Medication being filled and delivered vascepa   Doses left of specialty medications 1 week   Is there any medication that is due not being filled? No   Supplies needed? No supplies needed   Cooler needed? No   Do any medications need mixed or dated? No   Copay form of payment Credit card on file   Additional comments $9   Questions or concerns for the pharmacist? No   Explain any questions or concerns for the pharmacist n/a   Are any medications first time fills? No            Medication Adherence    Any gaps in refill history greater than 2 weeks in the last 3 months: no  Demonstrates understanding of importance of adherence: yes  Informant: patient  Reliability of informant: reliable  Provider-estimated medication adherence level: %  Reasons for non-adherence: no problems identified  Adherence tools used: alarm  Support network for adherence: family member, healthcare provider           Follow-up: 1 month(s)     GREG EDWARDS  Specialty Pharmacy Technician

## 2022-03-09 ENCOUNTER — OFFICE VISIT (OUTPATIENT)
Dept: FAMILY MEDICINE CLINIC | Facility: CLINIC | Age: 42
End: 2022-03-09

## 2022-03-09 VITALS
SYSTOLIC BLOOD PRESSURE: 120 MMHG | BODY MASS INDEX: 40.22 KG/M2 | DIASTOLIC BLOOD PRESSURE: 78 MMHG | HEIGHT: 63 IN | OXYGEN SATURATION: 99 % | WEIGHT: 227 LBS | HEART RATE: 68 BPM | RESPIRATION RATE: 16 BRPM

## 2022-03-09 DIAGNOSIS — M25.561 ACUTE PAIN OF RIGHT KNEE: Primary | ICD-10-CM

## 2022-03-09 PROCEDURE — 99213 OFFICE O/P EST LOW 20 MIN: CPT | Performed by: NURSE PRACTITIONER

## 2022-03-29 ENCOUNTER — SPECIALTY PHARMACY (OUTPATIENT)
Dept: ENDOCRINOLOGY | Age: 42
End: 2022-03-29

## 2022-03-29 NOTE — PROGRESS NOTES
Specialty Pharmacy Refill Coordination Note     Cora is a 42 y.o. female contacted today regarding refills of  1 specialty medication(s).    Reviewed and verified with patient:         Specialty medication(s) and dose(s) confirmed: yes    Refill Questions    Flowsheet Row Most Recent Value   Changes to allergies? No   Changes to medications? No   New conditions since last clinic visit No   Unplanned office visit, urgent care, ED, or hospital admission in the last 4 weeks  No   How does patient/caregiver feel medication is working? Very good   Financial problems or insurance changes  No   If yes, describe changes in insurance or financial issues. n/a   How many doses of your specialty medications were missed in the last 4 weeks? 0   Why were doses missed? n/a   Does this patient require a clinical escalation to a pharmacist? No          Delivery Questions    Flowsheet Row Most Recent Value   Delivery method FedEx   Delivery address correct? Yes   Preferred delivery time? Anytime   Number of medications in delivery 1   Medication being filled and delivered vascepa   Doses left of specialty medications 0   Is there any medication that is due not being filled? No   Supplies needed? No supplies needed   Cooler needed? No   Do any medications need mixed or dated? No   Copay form of payment Credit card on file   Additional comments $9   Questions or concerns for the pharmacist? No   Explain any questions or concerns for the pharmacist n/a   Are any medications first time fills? No            Medication Adherence    Any gaps in refill history greater than 2 weeks in the last 3 months: no  Demonstrates understanding of importance of adherence: yes  Informant: patient  Reliability of informant: reliable  Provider-estimated medication adherence level: %  Reasons for non-adherence: no problems identified  Adherence tools used: alarm  Support network for adherence: family member, healthcare provider          Follow-up: 1  month(s)     GREG EDWARDS  Specialty Pharmacy Technician

## 2022-04-13 RX ORDER — DAPAGLIFLOZIN AND METFORMIN HYDROCHLORIDE 5; 1000 MG/1; MG/1
TABLET, FILM COATED, EXTENDED RELEASE ORAL
Qty: 60 TABLET | Refills: 3 | Status: SHIPPED | OUTPATIENT
Start: 2022-04-13 | End: 2022-08-10 | Stop reason: SDUPTHER

## 2022-04-25 ENCOUNTER — LAB (OUTPATIENT)
Dept: ENDOCRINOLOGY | Age: 42
End: 2022-04-25

## 2022-04-25 DIAGNOSIS — E03.9 ACQUIRED HYPOTHYROIDISM: ICD-10-CM

## 2022-04-25 DIAGNOSIS — E78.2 HYPERLIPEMIA, MIXED: ICD-10-CM

## 2022-04-25 DIAGNOSIS — Z79.4 TYPE 2 DIABETES MELLITUS WITH HYPERGLYCEMIA, WITH LONG-TERM CURRENT USE OF INSULIN: ICD-10-CM

## 2022-04-25 DIAGNOSIS — E11.65 TYPE 2 DIABETES MELLITUS WITH HYPERGLYCEMIA, WITH LONG-TERM CURRENT USE OF INSULIN: ICD-10-CM

## 2022-04-26 LAB
BUN SERPL-MCNC: 15 MG/DL (ref 6–24)
BUN/CREAT SERPL: 18 (ref 9–23)
CALCIUM SERPL-MCNC: 8.9 MG/DL (ref 8.7–10.2)
CHLORIDE SERPL-SCNC: 102 MMOL/L (ref 96–106)
CHOLEST SERPL-MCNC: 168 MG/DL (ref 100–199)
CO2 SERPL-SCNC: 17 MMOL/L (ref 20–29)
CREAT SERPL-MCNC: 0.82 MG/DL (ref 0.57–1)
EGFRCR SERPLBLD CKD-EPI 2021: 92 ML/MIN/1.73
GLUCOSE SERPL-MCNC: 126 MG/DL (ref 65–99)
HBA1C MFR BLD: 8.6 % (ref 4.8–5.6)
HDLC SERPL-MCNC: 38 MG/DL
IMP & REVIEW OF LAB RESULTS: NORMAL
LDLC SERPL CALC-MCNC: 75 MG/DL (ref 0–99)
POTASSIUM SERPL-SCNC: 4.3 MMOL/L (ref 3.5–5.2)
SODIUM SERPL-SCNC: 139 MMOL/L (ref 134–144)
TRIGL SERPL-MCNC: 340 MG/DL (ref 0–149)
TSH SERPL DL<=0.005 MIU/L-ACNC: 7.25 UIU/ML (ref 0.45–4.5)
VLDLC SERPL CALC-MCNC: 55 MG/DL (ref 5–40)

## 2022-04-28 ENCOUNTER — SPECIALTY PHARMACY (OUTPATIENT)
Dept: ENDOCRINOLOGY | Age: 42
End: 2022-04-28

## 2022-04-28 NOTE — PROGRESS NOTES
Specialty Pharmacy Refill Coordination Note     Cora is a 42 y.o. female contacted today regarding refills of  1 specialty medication(s).    Reviewed and verified with patient:         Specialty medication(s) and dose(s) confirmed: yes    Refill Questions    Flowsheet Row Most Recent Value   Changes to allergies? No   Changes to medications? No   New conditions since last clinic visit No   Unplanned office visit, urgent care, ED, or hospital admission in the last 4 weeks  No   How does patient/caregiver feel medication is working? Very good   Financial problems or insurance changes  No   If yes, describe changes in insurance or financial issues. n/a   How many doses of your specialty medications were missed in the last 4 weeks? 0   Why were doses missed? n/a   Does this patient require a clinical escalation to a pharmacist? No          Delivery Questions    Flowsheet Row Most Recent Value   Delivery method [employer]Ex   Delivery address correct? Yes   Preferred delivery time? Anytime   Number of medications in delivery 1   Medication being filled and delivered vascepa   Doses left of specialty medications a few   Is there any medication that is due not being filled? No   Supplies needed? No supplies needed   Cooler needed? No   Do any medications need mixed or dated? No   Copay form of payment Credit card on file   Additional comments 9   Questions or concerns for the pharmacist? No   Explain any questions or concerns for the pharmacist n/a   Are any medications first time fills? No            Medication Adherence    Adherence tools used: alarm  Support network for adherence: family member, healthcare provider          Follow-up: 1 month(s)     GREG EDWARDS  Specialty Pharmacy Technician

## 2022-05-09 ENCOUNTER — OFFICE VISIT (OUTPATIENT)
Dept: ENDOCRINOLOGY | Age: 42
End: 2022-05-09

## 2022-05-09 VITALS
WEIGHT: 230 LBS | SYSTOLIC BLOOD PRESSURE: 120 MMHG | HEART RATE: 91 BPM | BODY MASS INDEX: 40.75 KG/M2 | HEIGHT: 63 IN | DIASTOLIC BLOOD PRESSURE: 63 MMHG | OXYGEN SATURATION: 99 %

## 2022-05-09 DIAGNOSIS — Z79.4 LONG-TERM INSULIN USE: ICD-10-CM

## 2022-05-09 DIAGNOSIS — E11.65 TYPE 2 DIABETES MELLITUS WITH HYPERGLYCEMIA, WITH LONG-TERM CURRENT USE OF INSULIN: Primary | ICD-10-CM

## 2022-05-09 DIAGNOSIS — E03.9 ACQUIRED HYPOTHYROIDISM: ICD-10-CM

## 2022-05-09 DIAGNOSIS — E11.69 HYPERLIPIDEMIA ASSOCIATED WITH TYPE 2 DIABETES MELLITUS: ICD-10-CM

## 2022-05-09 DIAGNOSIS — Z79.4 TYPE 2 DIABETES MELLITUS WITH HYPERGLYCEMIA, WITH LONG-TERM CURRENT USE OF INSULIN: Primary | ICD-10-CM

## 2022-05-09 DIAGNOSIS — E78.5 HYPERLIPIDEMIA ASSOCIATED WITH TYPE 2 DIABETES MELLITUS: ICD-10-CM

## 2022-05-09 PROCEDURE — 95251 CONT GLUC MNTR ANALYSIS I&R: CPT | Performed by: INTERNAL MEDICINE

## 2022-05-09 PROCEDURE — 99214 OFFICE O/P EST MOD 30 MIN: CPT | Performed by: INTERNAL MEDICINE

## 2022-05-09 RX ORDER — LEVOTHYROXINE SODIUM 175 MCG
TABLET ORAL
Qty: 90 TABLET | Refills: 2 | Status: SHIPPED | OUTPATIENT
Start: 2022-05-09 | End: 2023-02-16

## 2022-05-09 RX ORDER — INSULIN ASPART 100 [IU]/ML
INJECTION, SOLUTION INTRAVENOUS; SUBCUTANEOUS
Qty: 45 ML | Refills: 0 | Status: SHIPPED
Start: 2022-05-09 | End: 2022-11-17 | Stop reason: SDUPTHER

## 2022-05-09 NOTE — PROGRESS NOTES
"Chief Complaint  Chief Complaint   Patient presents with   • Diabetes     Type 2        Subjective          History of Present Illness    Cora Gutiérrez 42 y.o. presents with Type 2 dm as a F/u     Type 2 dm - Diagnosed about 10 - 20 years ago.   Today in clinic pt reports being on Basaglar - 60 units at bed time, novolog - 18/18/25 with each meal, xigduo XR - 1 tab twice daily. Trulicity was stopped due to hx of pancreatitis.   Avg bg - 176 mg/dl.   Checks BG - 4 - 5 times /day  Sensor - yes - DEXCOM  Dm retinopathy - x ,Last eye exam - 2020  Dm nephropathy - x  Dm neuropathy - x,Dm neuropathy meds -   CAD - x  CVA -x  Episodes of hypoglycemia - x  Pt is physically active. weight has been stable.   Pt tries to follow DM diet for most part.     Hypertriglyceridemia-patient was hospitalized because of the triglycerides and Sustained pancreatitis.  She had to undergo plasmapheresis for this.  Currently on fenofibrate, Crestor and fish oil for this.    Hypothyroidism  On Synthroid 150 mcg oral daily.  Compliant with the medication but takes all the medications at bedtime.    Interpreted the information of the CGM after downloading it and made the current insulin changes.  Avg bg -176  Trends noted -elevated blood sugars at dinnertime    I have reviewed the patient's allergies, medicines, past medical hx, family hx and social hx in detail.    Objective   Vital Signs:   /63   Pulse 91   Ht 160 cm (63\")   Wt 104 kg (230 lb)   SpO2 99%   BMI 40.74 kg/m²   Physical Exam   General appearance - no distress  Eyes- anicteric sclera  Ear nose and throat-external ears and nose normal.    Respiratory-normal chest on inspection.  No respiratory distress noted.  Skin-no rashes.  Neuro-alert and oriented x3            Result Review :   The following data was reviewed by: Fazal Jaramillo MD on 05/09/2022:  Lab on 04/25/2022   Component Date Value Ref Range Status   • TSH 04/25/2022 7.250 (A) 0.450 - 4.500 uIU/mL Final   • " Total Cholesterol 04/25/2022 168  100 - 199 mg/dL Final   • Triglycerides 04/25/2022 340 (A) 0 - 149 mg/dL Final   • HDL Cholesterol 04/25/2022 38 (A) >39 mg/dL Final   • VLDL Cholesterol Dionte 04/25/2022 55 (A) 5 - 40 mg/dL Final   • LDL Chol Calc (NIH) 04/25/2022 75  0 - 99 mg/dL Final   • Glucose 04/25/2022 126 (A) 65 - 99 mg/dL Final   • BUN 04/25/2022 15  6 - 24 mg/dL Final   • Creatinine 04/25/2022 0.82  0.57 - 1.00 mg/dL Final   • EGFR Result 04/25/2022 92  >59 mL/min/1.73 Final   • BUN/Creatinine Ratio 04/25/2022 18  9 - 23 Final   • Sodium 04/25/2022 139  134 - 144 mmol/L Final   • Potassium 04/25/2022 4.3  3.5 - 5.2 mmol/L Final   • Chloride 04/25/2022 102  96 - 106 mmol/L Final   • Total CO2 04/25/2022 17 (A) 20 - 29 mmol/L Final   • Calcium 04/25/2022 8.9  8.7 - 10.2 mg/dL Final   • Hemoglobin A1C 04/25/2022 8.6 (A) 4.8 - 5.6 % Final    Comment:          Prediabetes: 5.7 - 6.4           Diabetes: >6.4           Glycemic control for adults with diabetes: <7.0     • Interpretation 04/25/2022 Note   Final    Supplemental report is available.     Data reviewed: PCP notes       Results Review:    I reviewed the patient's new clinical results.     Assessment and Plan    Problem List Items Addressed This Visit        Other    Acquired hypothyroidism    Hyperlipidemia associated with type 2 diabetes mellitus (HCC)      Other Visit Diagnoses     Type 2 diabetes mellitus with hyperglycemia, with long-term current use of insulin (HCC)    -  Primary    Long-term insulin use (HCC)            Type 2 diabetes mellitus-uncontrolled with hyperglycemia  Continue Basaglar 60 units at bedtime  Change NovoLog to 18/18/28 with each meal  Continue Xigduo.    Not a candidate for GLP-1 analog given the history of pancreatitis    Hypertriglyceridemia  Continue Crestor, fenofibrate, fish oil.    Hypothyroidism  Increase the dosage to Synthroid 175 mcg oral daily.    Obesity  Discussed about the HMR program.  Would consider Contrave  "but concerned about the side effects of the medication at this time.  Might consider about this option at one of her other visits.  Interpreted the blood work-up/imaging results performed by the primary care/consulting physician -    Refills sent to pharmacy    Follow Up     Patient was given instructions and counseling regarding her condition or for health maintenance advice. Please see specific information pulled into the AVS if appropriate.       Thank you for asking me to see your patient, Cora Gutiérrez in consultation.         Fazal Jaramillo MD  05/09/22      EMR Dragon / transcription disclaimer:     \"Dictated utilizing Dragon dictation\".         "

## 2022-05-09 NOTE — PATIENT INSTRUCTIONS
Basaglar - 60 units at bed time, novolog - 18/18/28 with each meal  xigduo XR - 1 tab twice daily.    Recommend for HMR program

## 2022-05-12 ENCOUNTER — APPOINTMENT (OUTPATIENT)
Dept: MAMMOGRAPHY | Facility: HOSPITAL | Age: 42
End: 2022-05-12

## 2022-05-17 RX ORDER — PROCHLORPERAZINE 25 MG/1
SUPPOSITORY RECTAL
Qty: 3 EACH | Refills: 3 | Status: SHIPPED | OUTPATIENT
Start: 2022-05-17 | End: 2022-10-04

## 2022-05-23 ENCOUNTER — SPECIALTY PHARMACY (OUTPATIENT)
Dept: ENDOCRINOLOGY | Age: 42
End: 2022-05-23

## 2022-05-23 NOTE — PROGRESS NOTES
Specialty Pharmacy Refill Coordination Note     Cora is a 42 y.o. female contacted today regarding refill of Vascepa 1g.     Specialty medication(s) and dose(s) confirmed: yes    Refill Questions    Flowsheet Row Most Recent Value   Changes to allergies? No   Changes to medications? No   New conditions since last clinic visit No   Unplanned office visit, urgent care, ED, or hospital admission in the last 4 weeks  No   How does patient/caregiver feel medication is working? Very good   Financial problems or insurance changes  No   If yes, describe changes in insurance or financial issues. n/a   Since the previous refill, were any specialty medication doses or scheduled injections missed or delayed?  No   If yes, please provide the amount 0   Why were doses missed? n/a   Does this patient require a clinical escalation to a pharmacist? No          Delivery Questions    Flowsheet Row Most Recent Value   Delivery method FedEx   Delivery address correct? Yes   Delivery phone number [phone]   Preferred delivery time? Anytime   Number of medications in delivery 1   Medication being filled and delivered vascepa   Doses left of specialty medications a few   Is there any medication that is due not being filled? No   Supplies needed? No supplies needed   Cooler needed? No   Do any medications need mixed or dated? No   Copay form of payment Credit card on file   Additional comments $9 New credit card on file   Questions or concerns for the pharmacist? No   Explain any questions or concerns for the pharmacist n/a   Are any medications first time fills? No            Medication Adherence    Adherence tools used: alarm  Support network for adherence: family member, healthcare provider          Follow-up: 23 day(s)     Avinash Hunt, Pharmacy Technician  Specialty Pharmacy Technician

## 2022-05-31 RX ORDER — FENOFIBRATE 145 MG/1
TABLET, COATED ORAL
Qty: 90 TABLET | Refills: 0 | Status: SHIPPED | OUTPATIENT
Start: 2022-05-31 | End: 2022-08-30 | Stop reason: SDUPTHER

## 2022-06-24 ENCOUNTER — SPECIALTY PHARMACY (OUTPATIENT)
Dept: ENDOCRINOLOGY | Age: 42
End: 2022-06-24

## 2022-06-24 NOTE — PROGRESS NOTES
Specialty Pharmacy Refill Coordination Note     Cora is a 42 y.o. female contacted today regarding refills of  1 specialty medication(s).    Reviewed and verified with patient:         Specialty medication(s) and dose(s) confirmed: yes    Refill Questions    Flowsheet Row Most Recent Value   Changes to allergies? No   Changes to medications? No   New conditions since last clinic visit No   Unplanned office visit, urgent care, ED, or hospital admission in the last 4 weeks  No   How does patient/caregiver feel medication is working? Very good   Financial problems or insurance changes  No   If yes, describe changes in insurance or financial issues. n/a   Since the previous refill, were any specialty medication doses or scheduled injections missed or delayed?  No   If yes, please provide the amount n/a   Why were doses missed? n/a   Does this patient require a clinical escalation to a pharmacist? No          Delivery Questions    Flowsheet Row Most Recent Value   Delivery method [employer]Ex   Delivery address correct? Yes   Preferred delivery time? Anytime   Number of medications in delivery 1   Medication being filled and delivered vascepa   Doses left of specialty medications 1 week   Is there any medication that is due not being filled? No   Supplies needed? No supplies needed   Cooler needed? No   Do any medications need mixed or dated? No   Copay form of payment Credit card on file   Additional comments 9   Questions or concerns for the pharmacist? No   Explain any questions or concerns for the pharmacist n/a   Are any medications first time fills? No            Medication Adherence    Adherence tools used: alarm  Support network for adherence: family member, healthcare provider          Follow-up: 1 month(s)     GREG EDWARDS  Specialty Pharmacy Technician

## 2022-07-20 DIAGNOSIS — E78.2 HYPERLIPEMIA, MIXED: ICD-10-CM

## 2022-07-20 RX ORDER — ICOSAPENT ETHYL 1000 MG/1
2 CAPSULE ORAL 2 TIMES DAILY WITH MEALS
Qty: 360 CAPSULE | Refills: 1 | Status: CANCELLED | OUTPATIENT
Start: 2022-07-20

## 2022-07-21 DIAGNOSIS — E78.2 HYPERLIPEMIA, MIXED: ICD-10-CM

## 2022-07-21 RX ORDER — ICOSAPENT ETHYL 1000 MG/1
2 CAPSULE ORAL 2 TIMES DAILY WITH MEALS
Qty: 360 CAPSULE | Refills: 1 | Status: SHIPPED | OUTPATIENT
Start: 2022-07-21 | End: 2023-01-10 | Stop reason: SDUPTHER

## 2022-07-25 ENCOUNTER — SPECIALTY PHARMACY (OUTPATIENT)
Dept: ENDOCRINOLOGY | Age: 42
End: 2022-07-25

## 2022-07-26 ENCOUNTER — SPECIALTY PHARMACY (OUTPATIENT)
Dept: ENDOCRINOLOGY | Age: 42
End: 2022-07-26

## 2022-07-26 NOTE — PROGRESS NOTES
Specialty Pharmacy Patient Management Program  Endocrinology Reassessment     Cora Gutiérrez is a 42 y.o. female with Type 2 Diabetes and Hyperlipidemia seen by an Endocrinology provider and enrolled in the Endocrinology Patient Management program offered by [facility].  A follow-up outreach was conducted, including assessment of continued therapy appropriateness, medication adherence, and side effect incidence and management for Vascepa.    Changes to Insurance Coverage or Financial Support  [employer] + Vascepa copay card     Relevant Past Medical History and Comorbidities  Relevant medical history and concomitant health conditions were discussed with the patient. The patient's chart has been reviewed for relevant past medical history and comorbid health conditions and updated as necessary.   Past Medical History:   Diagnosis Date   • Diabetes mellitus (HCC)    • Fatty liver    • H/O Pancreatitis, acute    • Hyperlipidemia    • Hypothyroidism    • Left ovarian cyst    • Type 2 diabetes mellitus (HCC)      Social History     Socioeconomic History   • Marital status:    Tobacco Use   • Smoking status: Smoker, Current Status Unknown     Packs/day: 0.50     Types: Electronic Cigarette   • Smokeless tobacco: Never Used   • Tobacco comment: quit cigarrettes 4/01/19   Substance and Sexual Activity   • Alcohol use: Yes     Comment: 1 every 3 months    • Drug use: No   • Sexual activity: Yes     Partners: Male     Birth control/protection: OCP       Problem list reviewed by Laura Gupta, PharmHUSSEIN on 7/26/2022 at  3:53 PM    Allergies  Known allergies and reactions were discussed with the patient. The patient's chart has been reviewed for allergy information and updated as necessary.   Trulicity [dulaglutide]    Allergies reviewed by Laura Gupta, PharmD on 7/26/2022 at  4:01 PM    Relevant Laboratory Values  A1C Last 3 Results    HGBA1C Last 3 Results 11/3/21 1/19/22 4/25/22    Hemoglobin A1C 9.2 (A) 7.9 (A) 8.6 (A)   (A) Abnormal value       Comments are available for some flowsheets but are not being displayed.           Lab Results   Component Value Date    HGBA1C 8.6 (H) 04/25/2022     Lab Results   Component Value Date    GLUCOSE 126 (H) 04/25/2022    CALCIUM 8.9 04/25/2022     04/25/2022    K 4.3 04/25/2022    CO2 17 (L) 04/25/2022     04/25/2022    BUN 15 04/25/2022    CREATININE 0.82 04/25/2022    EGFRIFAFRI 99 01/19/2022    EGFRIFNONA 86 01/19/2022    BCR 18 04/25/2022     Lab Results   Component Value Date    CHLPL 168 04/25/2022    TRIG 340 (H) 04/25/2022    HDL 38 (L) 04/25/2022    LDL 75 04/25/2022     Microalbumin    Microalbumin 1/19/22   Microalbumin, Urine 5.1             Current Medication List  This medication list has been reviewed with the patient and evaluated for any interactions or necessary modifications/recommendations, and updated to include all prescription medications, OTC medications, and supplements the patient is currently taking.  This list reflects what is contained in the patient's profile, which has also been marked as reviewed to communicate to other providers it is the most up to date version of the patient's current medication therapy.     Current Outpatient Medications:   •  Continuous Blood Gluc Sensor (Dexcom G6 Sensor), USE AS DIRECTED AND CHANGE EVERY 10 DAYS, Disp: 3 each, Rfl: 3  •  Continuous Blood Gluc Transmit (Dexcom G6 Transmitter) misc, 1 Device Every 3 (Three) Months., Disp: 1 each, Rfl: 4  •  escitalopram (LEXAPRO) 10 MG tablet, TAKE ONE TABLET BY MOUTH DAILY, Disp: 90 tablet, Rfl: 3  •  famotidine (PEPCID) 40 MG tablet, Take 40 mg by mouth Every Evening., Disp: , Rfl:   •  fenofibrate (TRICOR) 145 MG tablet, TAKE ONE TABLET BY MOUTH DAILY, Disp: 90 tablet, Rfl: 0  •  glucose blood (Accu-Chek SmartView) test strip, Check 4 times daily Use as instructed, Disp: 100 each, Rfl: 12  •  insulin aspart (NovoLOG FlexPen) 100  UNIT/ML solution pen-injector sc pen, 18u B/L, 28u D, Disp: 45 mL, Rfl: 0  •  Insulin Glargine (BASAGLAR KWIKPEN) 100 UNIT/ML injection pen, Inject 60 Units under the skin into the appropriate area as directed Every Night., Disp: 45 mL, Rfl: 3  •  Insulin Pen Needle (BD Pen Needle Gina U/F) 32G X 4 MM misc, USE FIVE TIMES DAILY WITH INSULIN, Disp: 500 each, Rfl: 0  •  lidocaine (LIDODERM) 5 %, Place 1 patch on the skin as directed by provider Daily As Needed (back pain)., Disp: , Rfl:   •  ondansetron ODT (ZOFRAN ODT) 8 MG disintegrating tablet, Take 1 tablet by mouth Every 8 (Eight) Hours As Needed for Nausea or Vomiting., Disp: 21 tablet, Rfl: 0  •  rosuvastatin (CRESTOR) 40 MG tablet, TAKE ONE TABLET BY MOUTH DAILY, Disp: 90 tablet, Rfl: 9  •  Synthroid 175 MCG tablet, By mouth take 1 tab daily in AM as directed on empty stomach with water only.  Brand Medically Necessary, Disp: 90 tablet, Rfl: 2  •  Tri-Sprintec 0.18/0.215/0.25 MG-35 MCG per tablet, TAKE ONE TABLET BY MOUTH DAILY NEEDS APPOINTMENT FOR MORE REFILLS, Disp: 84 tablet, Rfl: 3  •  Vascepa 1 g capsule capsule, Take 2 capsules by mouth 2 (Two) Times a Day With Meals., Disp: 360 capsule, Rfl: 1  •  Xigduo XR 5-1000 MG tablet, TAKE ONE TABLET BY MOUTH TWICE A DAY, Disp: 60 tablet, Rfl: 3    Medicines reviewed by Laura Gupta, PharmD on 7/26/2022 at  4:03 PM    Drug Interactions  None    Recommended Medications Assessment  • Aspirin - Not Indicated  • Statin - Currently Taking   • ACEi/ARB - Not Indicated    Adverse Drug Reactions  • Adverse Reactions Experienced: None   • Plan for ADR Management: Not required    Hospitalizations and Urgent Care Since Last Assessment  • Hospitalizations or Admissions: None   • ED Visits: None  • Urgent Office Visits: None     Adherence and Self-Administration  Adherence related patient's specialty therapy was discussed with the patient. The Adherence segment of this outreach has been reviewed and updated.      Medication Adherence    What concerns does the patient have in regards to their medications: Price of Basaglar, fenofibrate, rosuvastatin and Xigduo - will check prices when insurance allows these claims to be processed again  Patient reported X missed doses in the last 7 days: 0  Any gaps in refill history greater than 2 weeks in the last 3 months: no  Demonstrates understanding of importance of adherence: yes  Informant: patient  Reliability of informant: reliable  Estimated medication adherence level: %  Adherence estimation source: claims history  Reasons for non-adherence: no problems identified  Adherence tools used: patient uses a pill box to manage medications  Support network for adherence: family member, healthcare provider        • Ongoing or New Barriers to Patient Self-Administration: None  • Methods for Supporting Patient Self-Administration: Not required    Goals of Therapy  Goals related to the patient's specialty therapy was discussed with the patient. The Patient Goals segment of this outreach has been reviewed and updated.    Goals     • Consistently take medications as prescribed     • Specialty Pharmacy General Goal      Reduce triglycerides < 150 mg/dL  • 04/25/22 = 340 mg/dL  • 01/19/22 = 233 mg/dL (01/19/22)  **Improved from > 2000 mg/dL in Nov. 2021 during acute pancreatitis admission             Quality of Life Assessment   Quality of Life related to the patient's specialty therapy was discussed with the patient. The QOL segment of this outreach has been reviewed and updated.     Quality of Life Assessment  Quality of Life Improvement Scale: Somewhat better    Reassessment Plan & Follow-Up  1. Medication Therapy Changes: None   2. Additional Plans, Therapy Recommendations, or Therapy Problems to Be Addressed: Patient asked about prices for rosuvastatin, fenofibrate, Basaglar and Xigduo - will assess those prices here and contact patient   3. Pharmacist to perform regular  reassessments no more than (6) months from the previous assessment.  4. Care Coordinator to set up future refill outreaches, coordinate prescription delivery, and escalate clinical questions to pharmacist.     Attestation  I attest that the specialty medication(s) addressed above are appropriate for the patient based on my reassessment.  If the prescribed therapy is at any point deemed not appropriate based on the current or future assessments, a consultation will be initiated with the patient's specialty care provider to determine the best course of action. The revised plan of therapy will be documented along with any additional patient education provided.     Laura Gupta, PharmD, BCCP, BCPS   7/26/2022  16:06 EDT

## 2022-07-29 ENCOUNTER — HOSPITAL ENCOUNTER (OUTPATIENT)
Dept: MAMMOGRAPHY | Facility: HOSPITAL | Age: 42
End: 2022-07-29

## 2022-08-10 ENCOUNTER — SPECIALTY PHARMACY (OUTPATIENT)
Dept: ENDOCRINOLOGY | Age: 42
End: 2022-08-10

## 2022-08-10 RX ORDER — DAPAGLIFLOZIN AND METFORMIN HYDROCHLORIDE 5; 1000 MG/1; MG/1
1 TABLET, FILM COATED, EXTENDED RELEASE ORAL 2 TIMES DAILY
Qty: 60 TABLET | Refills: 2 | Status: SHIPPED | OUTPATIENT
Start: 2022-08-10 | End: 2022-10-24 | Stop reason: SDUPTHER

## 2022-08-10 NOTE — TELEPHONE ENCOUNTER
Patient would like her Xigduo filled through  now as we can fill for no copay.    LV:  05/09/22 w/Clint  [place]:  10/25/22 w/Thais Gupta, PharmD  8/10/2022  10:16 EDT

## 2022-08-10 NOTE — PROGRESS NOTES
Specialty Pharmacy Patient Management Program  Endocrinology Reassessment     Cora Gutiérrez is a 42 y.o. female with Type 2 Diabetes and Hyperlipidemia seen by an Endocrinology provider and enrolled in the Endocrinology Patient Management program offered by [facility].  A follow-up outreach was conducted, including assessment of continued therapy appropriateness, medication adherence, and side effect incidence and management for Xigduo and Vascepa.    Changes to Insurance Coverage or Financial Support  No changes -  [employer] + Vascepa and Xigduo copay cards    Relevant Past Medical History and Comorbidities  Relevant medical history and concomitant health conditions were discussed with the patient. The patient's chart has been reviewed for relevant past medical history and comorbid health conditions and updated as necessary.   Past Medical History:   Diagnosis Date   • Diabetes mellitus (HCC)    • Fatty liver    • H/O Pancreatitis, acute    • Hyperlipidemia    • Hypothyroidism    • Left ovarian cyst    • Type 2 diabetes mellitus (HCC)      Social History     Socioeconomic History   • Marital status:    Tobacco Use   • Smoking status: Smoker, Current Status Unknown     Packs/day: 0.50     Types: Electronic Cigarette   • Smokeless tobacco: Never Used   • Tobacco comment: quit cigarrettes 4/01/19   Substance and Sexual Activity   • Alcohol use: Yes     Comment: 1 every 3 months    • Drug use: No   • Sexual activity: Yes     Partners: Male     Birth control/protection: OCP       Problem list reviewed by Laura Gupta, Kanika on 8/10/2022 at 10:23 AM    Allergies  Known allergies and reactions were discussed with the patient. The patient's chart has been reviewed for allergy information and updated as necessary.   Trulicity [dulaglutide]    Allergies reviewed by Larua Gupta, PharmHUSSEIN on 8/10/2022 at 10:22 AM    Relevant Laboratory Values  A1C Last 3 Results    HGBA1C Last 3  Results 11/3/21 1/19/22 4/25/22   Hemoglobin A1C 9.2 (A) 7.9 (A) 8.6 (A)   (A) Abnormal value       Comments are available for some flowsheets but are not being displayed.           Lab Results   Component Value Date    HGBA1C 8.6 (H) 04/25/2022     Lab Results   Component Value Date    GLUCOSE 126 (H) 04/25/2022    CALCIUM 8.9 04/25/2022     04/25/2022    K 4.3 04/25/2022    CO2 17 (L) 04/25/2022     04/25/2022    BUN 15 04/25/2022    CREATININE 0.82 04/25/2022    EGFRIFAFRI 99 01/19/2022    EGFRIFNONA 86 01/19/2022    BCR 18 04/25/2022     Lab Results   Component Value Date    CHLPL 168 04/25/2022    TRIG 340 (H) 04/25/2022    HDL 38 (L) 04/25/2022    LDL 75 04/25/2022     Microalbumin    Microalbumin 1/19/22   Microalbumin, Urine 5.1             Current Medication List  This medication list has been reviewed with the patient and evaluated for any interactions or necessary modifications/recommendations, and updated to include all prescription medications, OTC medications, and supplements the patient is currently taking.  This list reflects what is contained in the patient's profile, which has also been marked as reviewed to communicate to other providers it is the most up to date version of the patient's current medication therapy.     Current Outpatient Medications:   •  Continuous Blood Gluc Sensor (Dexcom G6 Sensor), USE AS DIRECTED AND CHANGE EVERY 10 DAYS, Disp: 3 each, Rfl: 3  •  Continuous Blood Gluc Transmit (Dexcom G6 Transmitter) misc, 1 Device Every 3 (Three) Months., Disp: 1 each, Rfl: 4  •  dapagliflozin-metformin HCl ER (Xigduo XR) 5-1000 MG tablet, Take 1 tablet by mouth 2 (Two) Times a Day., Disp: 60 tablet, Rfl: 2  •  escitalopram (LEXAPRO) 10 MG tablet, TAKE ONE TABLET BY MOUTH DAILY, Disp: 90 tablet, Rfl: 3  •  famotidine (PEPCID) 40 MG tablet, Take 40 mg by mouth Every Evening., Disp: , Rfl:   •  fenofibrate (TRICOR) 145 MG tablet, TAKE ONE TABLET BY MOUTH DAILY, Disp: 90 tablet,  Rfl: 0  •  glucose blood (Accu-Chek SmartView) test strip, Check 4 times daily Use as instructed, Disp: 100 each, Rfl: 12  •  insulin aspart (NovoLOG FlexPen) 100 UNIT/ML solution pen-injector sc pen, 18u B/L, 28u D, Disp: 45 mL, Rfl: 0  •  Insulin Glargine (BASAGLAR KWIKPEN) 100 UNIT/ML injection pen, Inject 60 Units under the skin into the appropriate area as directed Every Night., Disp: 45 mL, Rfl: 3  •  Insulin Pen Needle (BD Pen Needle Gina U/F) 32G X 4 MM misc, USE FIVE TIMES DAILY WITH INSULIN, Disp: 500 each, Rfl: 0  •  lidocaine (LIDODERM) 5 %, Place 1 patch on the skin as directed by provider Daily As Needed (back pain)., Disp: , Rfl:   •  ondansetron ODT (ZOFRAN ODT) 8 MG disintegrating tablet, Take 1 tablet by mouth Every 8 (Eight) Hours As Needed for Nausea or Vomiting., Disp: 21 tablet, Rfl: 0  •  rosuvastatin (CRESTOR) 40 MG tablet, TAKE ONE TABLET BY MOUTH DAILY, Disp: 90 tablet, Rfl: 9  •  Synthroid 175 MCG tablet, By mouth take 1 tab daily in AM as directed on empty stomach with water only.  Brand Medically Necessary, Disp: 90 tablet, Rfl: 2  •  Tri-Sprintec 0.18/0.215/0.25 MG-35 MCG per tablet, TAKE ONE TABLET BY MOUTH DAILY NEEDS APPOINTMENT FOR MORE REFILLS, Disp: 84 tablet, Rfl: 3  •  Vascepa 1 g capsule capsule, Take 2 capsules by mouth 2 (Two) Times a Day With Meals., Disp: 360 capsule, Rfl: 1    Medicines reviewed by Laura Gupta, PharmD on 8/10/2022 at 10:23 AM    Drug Interactions  None    Recommended Medications Assessment  • Aspirin - Not Indicated  • Statin - Currently Taking   • ACEi/ARB - Not Indicated    Adverse Drug Reactions  • Adverse Reactions Experienced: None   • Plan for ADR Management: Not required    Hospitalizations and Urgent Care Since Last Assessment  • Hospitalizations or Admissions: None   • ED Visits: None  • Urgent Office Visits: None     Adherence, Self-Administration, and Current Therapy Problems  Adherence related patient's specialty therapy was discussed with  the patient. The Adherence segment of this outreach has been reviewed and updated.     Medication Adherence    Adherence tools used: patient uses a pill box to manage medications  Support network for adherence: family member, healthcare provider        • Ongoing or New Barriers to Patient Self-Administration: Price of medications  • Methods for Supporting Patient Self-Administration: will test prices and available savings for patient at      Medication Therapy Recommendations  No medication therapy recommendations to display     Goals of Therapy  Goals related to the patient's specialty therapy was discussed with the patient. The Patient Goals segment of this outreach has been reviewed and updated.    Goals     • Consistently take medications as prescribed     • Specialty Pharmacy General Goal      Reduce triglycerides < 150 mg/dL  • 04/25/22 = 340 mg/dL  • 01/19/22 = 233 mg/dL (01/19/22)  **Improved from > 2000 mg/dL in Nov. 2021 during acute pancreatitis admission      • Specialty Pharmacy General Goal      Reduce HbA1c < 8%  • 04/25/22 = 8.6%  • 01/19/22 = 7.9%               Quality of Life Assessment   Quality of Life related to the patient's specialty therapy was discussed with the patient. The QOL segment of this outreach has been reviewed and updated.     Quality of Life Assessment  Quality of Life Improvement Scale: Somewhat better     Initial Education Provided for Specialty Medication  The patient has been provided with the following education and any applicable administration techniques (i.e. self-injection) have been demonstrated for the therapies indicated. All questions and concerns have been addressed prior to the patient receiving the medication, and the patient has verbalized understanding of the education and any materials provided.  Additional patient education shall be provided and documented upon request by the patient, provider or payer.      XIGDUO® XR (dapagliflozin + metformin)    Medication Expectations   Why am I taking this medication? You are taking Xigduo to lower blood sugar because you have type 2 diabetes. Diabetes is not curable but with proper medication and treatment, we can keep your blood sugar within your personalized target range. Farxiga (dapagliflozin), one of the medications in Xigduo, can reduce the risk of progression of kidney disease, reduce the risk of death from heart attack or stroke, and reduce the risk of heart failure hospitalization.    What should I expect while on this medication? You should expect to see your blood sugar and A1c decrease over time. You may also see a decrease in your blood pressure and it can help some people lose weight.     How does the medication work? Xigduo works by removing some sugar that the body doesn't need through urination, lowering sugar production in the body, reducing the amount of sugar that enters the bloodstream after a meal, and making your body more sensitive to insulin.     How long will I be on this medication for? The amount of time you will be on this medication will be determined by your doctor based on blood sugar and A1c control. You will most likely be on this medication or another diabetes medication throughout your lifetime. Do not abruptly stop this medication without talking to your doctor first.    How do I take this medication? Take as directed on your prescription label, usually once daily in the morning. Swallow tablets whole, do not crush/cut/chew. Take with food.     What are some possible side effects? The most common side effects include urinary tract infections, genital yeast infections, increased urination, diarrhea, gas, nausea and vomiting, stomach pain, indigestion, headache, and stuffy or runny nose and sore throat. Talk with your doctor if you notice white or yellow vaginal discharge, vaginal itching or odor of if you notice redness, itching, pain, or swelling of the penis and/or bad-smelling  discharge from the penis.   What happens if I miss a dose? If you miss a dose, take it as soon as you remember. If it is close to your next dose, skip it (do not take 2 doses at once)     Medication Safety   What are things I should warn my doctor immediately about? Tell your doctor if you have kidney disease, liver disease, heart failure, pancreas problems, vitamin B-12 deficiency, or history of frequent genital yeast or urinary tract infections. Tell your doctor if you are on a low-salt diet, if you drink alcohol, or if you are having surgery. Talk to your doctor if you are pregnant, planning to become pregnant, or breastfeeding. If you have irregular periods or none at all but have not gone through menopause, this medication can cause ovulation and increase the chance of getting pregnant. Also tell your doctor if you notice any signs/symptoms of an allergic reaction (rash, hives, difficulty breathing, etc.).   What are things that I should be cautious of? Be cautious of any side effects from this medication. Talk to your doctor if any new ones develop or aren't getting better.   What are some medications that can interact with this one? This medication can interact with the dye used for an x-ray or CT-scan. Some medications that interact include ranolazine, cimetidine, diuretics (water pills), and other medications that may also lower your blood sugar such as insulins and glipizide/glimepiride/glyburide. Your doctor may reduce the dose of other diabetes medications when you start Xigduo to minimize low blood sugars. Always tell your doctor or pharmacist immediately if you start taking any new medications, including over-the-counter medications, vitamins, and herbal supplements.     Medication Storage/Handling   How should I handle this medication? Keep this medication out of reach of pets/children in tightly sealed container   How does this medication need to be stored? Store at room temperature and keep dry  (don't keep in bathroom or other room with moisture)   How should I dispose of this medication? There should not be a need to dispose of this medication unless your provider decides to change the dose or therapy. If that is the case, take to your local police station for proper disposal. Some pharmacies also have take-back bins for medication drop-off.      Resources/Support   How can I remind myself to take this medication? You can download reminder apps to help you manage your refills. You may also set an alarm on your phone to remind you. The pharmacy carries pill boxes that you can place next to an area you pass everyday (such as where you place your car keys or where you charge your phone)   Is financial support available?  [facility] can provide co-pay cards if you have commercial insurance or patient assistance if you have Medicare or no insurance.    Which vaccines are recommended for me? Talk to your doctor about these vaccines: Flu, Coronavirus (COVID-19), Pneumococcal (pneumonia), Tdap, Hepatitis B, Zoster (shingles)        Reassessment Plan & Follow-Up  1. Medication Therapy Changes: Xigduo XR transferred to    2. Additional Plans, Therapy Recommendations, or Therapy Problems to Be Addressed: tested price of Xigduo XR for patient and added copay card - reduced price from $15 to no copay   3. Pharmacist to perform regular reassessments no more than (6) months from the previous assessment.  4. Care Coordinator to set up future refill outreaches, coordinate prescription delivery, and escalate clinical questions to pharmacist.     Attestation  I attest that the specialty medication(s) addressed above are appropriate for the patient based on my reassessment.  If the prescribed therapy is at any point deemed not appropriate based on the current or future assessments, a consultation will be initiated with the patient's specialty care provider to determine the best course of action. The revised plan of  therapy will be documented along with any additional patient education provided.     Laura Gupta, Kanika, BCCP, BCPS   8/10/2022  11:32 EDT     ______________________________________________________________________________________________________________________________________________________________    Specialty Pharmacy Refill Coordination Note     Cora is a 42 y.o. female contacted today regarding refills of her specialty medication(s).    Specialty medication(s) and dose(s) confirmed: Xigduo XR  Changes to medications: no  Changes to insurance: no  Reviewed and verified with patient:  Allergies  Meds  Problems         Refill Questions    Flowsheet Row Most Recent Value   Changes to allergies? No   Changes to medications? No   New conditions since last clinic visit No   Unplanned office visit, urgent care, ED, or hospital admission in the last 4 weeks  No   How does patient/caregiver feel medication is working? Very good   Financial problems or insurance changes  No   If yes, describe changes in insurance or financial issues. n/a   Since the previous refill, were any specialty medication doses or scheduled injections missed or delayed?  No   If yes, please provide the amount n/a   Why were doses missed? n/a   Does this patient require a clinical escalation to a pharmacist? No          Delivery Questions    Flowsheet Row Most Recent Value   Delivery method [employer]  [Ship 8/11 for delivery 8/12]   Delivery address correct? Yes   Delivery phone number [phone]   Preferred delivery time? Anytime   Number of medications in delivery 1   Medication being filled and delivered Xigdo XR   Doses left of specialty medications about 1 week   Is there any medication that is due not being filled? No   Supplies needed? No supplies needed   Cooler needed? No   Do any medications need mixed or dated? No   Copay form of payment Credit card on file   Additional comments No copay   Questions or concerns for the pharmacist?  No   Explain any questions or concerns for the pharmacist n/a   Are any medications first time fills? No             Follow-up: 3 week(s)     Laura Gupta PharmD  8/10/2022   11:40 EDT

## 2022-08-15 RX ORDER — PROCHLORPERAZINE 25 MG/1
SUPPOSITORY RECTAL
Qty: 1 EACH | Refills: 4 | Status: SHIPPED | OUTPATIENT
Start: 2022-08-15 | End: 2022-11-02 | Stop reason: SDUPTHER

## 2022-08-18 RX ORDER — DAPAGLIFLOZIN AND METFORMIN HYDROCHLORIDE 5; 1000 MG/1; MG/1
TABLET, FILM COATED, EXTENDED RELEASE ORAL
Qty: 60 TABLET | Refills: 2 | OUTPATIENT
Start: 2022-08-18

## 2022-08-19 ENCOUNTER — SPECIALTY PHARMACY (OUTPATIENT)
Dept: ENDOCRINOLOGY | Age: 42
End: 2022-08-19

## 2022-08-19 NOTE — PROGRESS NOTES
Specialty Pharmacy Refill Coordination Note     Cora is a 42 y.o. female contacted today regarding refills of  1 specialty medication(s).    Reviewed and verified with patient:       Specialty medication(s) and dose(s) confirmed: yes    Refill Questions    Flowsheet Row Most Recent Value   Changes to allergies? No   Changes to medications? No   New conditions since last clinic visit No   Unplanned office visit, urgent care, ED, or hospital admission in the last 4 weeks  No   How does patient/caregiver feel medication is working? Very good   Financial problems or insurance changes  No   If yes, describe changes in insurance or financial issues. n/a   Since the previous refill, were any specialty medication doses or scheduled injections missed or delayed?  No   If yes, please provide the amount n/a   Why were doses missed? n/a   Does this patient require a clinical escalation to a pharmacist? No          Delivery Questions    Flowsheet Row Most Recent Value   Delivery method Other (Comment)  [beeline]   Delivery address correct? Yes   Preferred delivery time? Anytime   Number of medications in delivery 1   Medication being filled and delivered vascepa   Doses left of specialty medications 1 week   Is there any medication that is due not being filled? No   Supplies needed? No supplies needed   Cooler needed? No   Do any medications need mixed or dated? No   Copay form of payment Credit card on file   Additional comments 9   Questions or concerns for the pharmacist? No   Explain any questions or concerns for the pharmacist n/a   Are any medications first time fills? No            Medication Adherence    Adherence tools used: patient uses a pill box to manage medications  Support network for adherence: family member, healthcare provider          Follow-up: 1 month(s)     GREG EDWARDS  Specialty Pharmacy Technician

## 2022-08-24 RX ORDER — DAPAGLIFLOZIN AND METFORMIN HYDROCHLORIDE 5; 1000 MG/1; MG/1
TABLET, FILM COATED, EXTENDED RELEASE ORAL
Qty: 60 TABLET | Refills: 2 | OUTPATIENT
Start: 2022-08-24

## 2022-08-29 ENCOUNTER — SPECIALTY PHARMACY (OUTPATIENT)
Dept: ENDOCRINOLOGY | Age: 42
End: 2022-08-29

## 2022-08-29 NOTE — PROGRESS NOTES
Specialty Pharmacy Patient Management Program  Endocrinology Refill Outreach      Cora is a 42 y.o. female contacted today regarding refills of her medication(s). Additionally, test claims were ran for both rosuvastatin and fenofibrate, and the patient has requested that these prescriptions be transferred to [facility] pharmacy. Will have the retail pharmacy initiate these transfers and plan to ship all 3 medications 9/5/22 for delivery on 9/6/22.    Specialty medication(s) and dose(s) confirmed: Xigduo  Other medications being refilled: rosuvastatin and fenofibrate    Refill Questions    Flowsheet Row Most Recent Value   Changes to allergies? No   Changes to medications? No   New conditions since last clinic visit No   Unplanned office visit, urgent care, ED, or hospital admission in the last 4 weeks  No   How does patient/caregiver feel medication is working? Very good   Financial problems or insurance changes  No   Since the previous refill, were any specialty medication doses or scheduled injections missed or delayed?  No   Does this patient require a clinical escalation to a pharmacist? No          Delivery Questions    Flowsheet Row Most Recent Value   Delivery method Other (Comment)  [Beeline]   Delivery address correct? Yes   Delivery phone number [phone]   Preferred delivery time? Anytime   Number of medications in delivery 3   Medication being filled and delivered Xigduo, rosuvastatin, fenofibrate   Doses left of specialty medications 1 week   Is there any medication that is due not being filled? No   Supplies needed? No supplies needed   Cooler needed? No   Do any medications need mixed or dated? No   Copay form of payment Credit card on file   Questions or concerns for the pharmacist? No   Are any medications first time fills? No   Shipment status Delivery complete          Medication Adherence    Adherence tools used: patient uses a pill box to manage medications  Support network for adherence:  family member, healthcare provider            Follow-up: 30 days      Radah Rowland, BrendaD   8/29/2022  15:28 EDT

## 2022-08-30 RX ORDER — FENOFIBRATE 145 MG/1
145 TABLET, COATED ORAL DAILY
Qty: 90 TABLET | Refills: 1 | Status: SHIPPED | OUTPATIENT
Start: 2022-08-30 | End: 2022-09-15

## 2022-09-01 ENCOUNTER — APPOINTMENT (OUTPATIENT)
Dept: MAMMOGRAPHY | Facility: HOSPITAL | Age: 42
End: 2022-09-01

## 2022-09-14 ENCOUNTER — SPECIALTY PHARMACY (OUTPATIENT)
Dept: ENDOCRINOLOGY | Age: 42
End: 2022-09-14

## 2022-09-15 RX ORDER — FENOFIBRATE 145 MG/1
TABLET, COATED ORAL
Qty: 90 TABLET | Refills: 1 | Status: SHIPPED | OUTPATIENT
Start: 2022-09-15 | End: 2022-09-29

## 2022-09-27 RX ORDER — ROSUVASTATIN CALCIUM 40 MG/1
40 TABLET, COATED ORAL DAILY
Qty: 90 TABLET | Refills: 9 | Status: SHIPPED | OUTPATIENT
Start: 2022-09-27

## 2022-09-28 ENCOUNTER — SPECIALTY PHARMACY (OUTPATIENT)
Dept: ENDOCRINOLOGY | Age: 42
End: 2022-09-28

## 2022-09-28 NOTE — PROGRESS NOTES
Specialty Pharmacy Refill Coordination Note     Cora is a 42 y.o. female contacted today regarding refills of  2 specialty medication(s).    Reviewed and verified with patient:       Specialty medication(s) and dose(s) confirmed: yes    Refill Questions    Flowsheet Row Most Recent Value   Changes to allergies? No   Changes to medications? No   New conditions since last clinic visit No   Unplanned office visit, urgent care, ED, or hospital admission in the last 4 weeks  No   How does patient/caregiver feel medication is working? Very good   Financial problems or insurance changes  No   If yes, describe changes in insurance or financial issues. n/a   Since the previous refill, were any specialty medication doses or scheduled injections missed or delayed?  No   If yes, please provide the amount n/a   Why were doses missed? n/a   Does this patient require a clinical escalation to a pharmacist? No          Delivery Questions    Flowsheet Row Most Recent Value   Delivery method Other (Comment)  [beeline 9/30]   Delivery address correct? Yes   Preferred delivery time? Anytime   Number of medications in delivery 2   Medication being filled and delivered rosuvastatin, xigduo   Doses left of specialty medications 1 week   Is there any medication that is due not being filled? No   Supplies needed? No supplies needed   Cooler needed? No   Do any medications need mixed or dated? No   Copay form of payment Credit card on file   Additional comments 20   Questions or concerns for the pharmacist? No   Explain any questions or concerns for the pharmacist n/a   Are any medications first time fills? No            Medication Adherence    Adherence tools used: patient uses a pill box to manage medications  Support network for adherence: family member, healthcare provider          Follow-up: 1 month(s)     Bhavani Cunningham  Specialty Pharmacy Technician

## 2022-09-29 RX ORDER — FENOFIBRATE 145 MG/1
145 TABLET, COATED ORAL DAILY
Qty: 90 TABLET | Refills: 1 | Status: SHIPPED | OUTPATIENT
Start: 2022-09-29 | End: 2023-02-20 | Stop reason: SDUPTHER

## 2022-10-04 RX ORDER — PROCHLORPERAZINE 25 MG/1
SUPPOSITORY RECTAL
Qty: 3 EACH | Refills: 5 | Status: SHIPPED | OUTPATIENT
Start: 2022-10-04 | End: 2022-10-12 | Stop reason: SDUPTHER

## 2022-10-07 ENCOUNTER — TELEPHONE (OUTPATIENT)
Dept: ENDOCRINOLOGY | Age: 42
End: 2022-10-07

## 2022-10-07 ENCOUNTER — PRIOR AUTHORIZATION (OUTPATIENT)
Dept: ENDOCRINOLOGY | Age: 42
End: 2022-10-07

## 2022-10-07 DIAGNOSIS — E11.65 TYPE 2 DIABETES MELLITUS WITH HYPERGLYCEMIA, WITH LONG-TERM CURRENT USE OF INSULIN: Primary | ICD-10-CM

## 2022-10-07 DIAGNOSIS — Z79.4 TYPE 2 DIABETES MELLITUS WITH HYPERGLYCEMIA, WITH LONG-TERM CURRENT USE OF INSULIN: Primary | ICD-10-CM

## 2022-10-10 ENCOUNTER — SPECIALTY PHARMACY (OUTPATIENT)
Dept: ENDOCRINOLOGY | Age: 42
End: 2022-10-10

## 2022-10-10 NOTE — PROGRESS NOTES
Specialty Pharmacy Patient Management Program  One-Time Clinical Outreach     Cora Gutiérrez is a 42 y.o. female with Type 2 Diabetes seen by an Endocrinology provider and enrolled in the Endocrinology Patient Management program offered by [facility].  A one-time clinical outreach was conducted regarding the price of Basaglar.  Patient had previously asked about the price of Basaglar at  Pharmacy.  For a 25-day supply = $25 and for a 75-day supply = $35, patient mentioned that was similar to her current cost and she would keep her Basaglar Rx at her current pharmacy.    Patient also asked about Dexcom PA.  She currently fills CGM at an outside pharmacy and submitted that PA request to the office.  I attempted a test claim for Dexcom, but received a message that [facility] filled CGM today (10/10/22).  I offered to fill this for the patient as well, if she would like.      Laura Gupta, PharmD, [facility], BCPS   10/10/2022  11:53 EDT       Specialty Pharmacy Patient Management Program  Refill Outreach      Cora is a 42 y.o. female contacted today regarding refills of her medication(s).    Specialty medication(s) and dose(s) confirmed: Vascepa 1g  Other medications being refilled: fenofibrate 145 mg  Changes to medications: no  Changes to insurance: no  Reviewed and verified with patient:  Allergies  Meds  Problems         Refill Questions    Flowsheet Row Most Recent Value   Changes to allergies? No   Changes to medications? No   New conditions since last clinic visit No   Unplanned office visit, urgent care, ED, or hospital admission in the last 4 weeks  No   How does patient/caregiver feel medication is working? Very good   Financial problems or insurance changes  No   If yes, describe changes in insurance or financial issues. n/a   Since the previous refill, were any specialty medication doses or scheduled injections missed or delayed?  No   If yes, please provide the amount n/a   Why  were doses missed? n/a   Does this patient require a clinical escalation to a pharmacist? No          Delivery Questions    Flowsheet Row Most Recent Value   Delivery method Other (Comment)  [[facility] ship 10/14 for delivery 10/17]   Delivery address correct? Yes   Delivery phone number [phone]   Preferred delivery time? Anytime   Number of medications in delivery 2   Medication being filled and delivered Vascepa, fenofibrate   Doses left of specialty medications 1 week   Is there any medication that is due not being filled? No   Supplies needed? No supplies needed   Cooler needed? No   Do any medications need mixed or dated? No   Copay form of payment Credit card on file   Additional comments $29   Questions or concerns for the pharmacist? Yes   Explain any questions or concerns for the pharmacist She asked that payment not be processed until Friday, 10/14   Are any medications first time fills? No             Follow-up: 10/23/22 for other refills      Larua Gupta, Kanika  10/10/2022   11:53 EDT

## 2022-10-12 RX ORDER — PROCHLORPERAZINE 25 MG/1
SUPPOSITORY RECTAL
Qty: 9 EACH | Refills: 1 | Status: SHIPPED | OUTPATIENT
Start: 2022-10-12 | End: 2022-11-02 | Stop reason: SDUPTHER

## 2022-10-24 RX ORDER — DAPAGLIFLOZIN AND METFORMIN HYDROCHLORIDE 5; 1000 MG/1; MG/1
1 TABLET, FILM COATED, EXTENDED RELEASE ORAL DAILY
Qty: 60 TABLET | Refills: 2 | Status: SHIPPED | OUTPATIENT
Start: 2022-10-24 | End: 2023-02-28 | Stop reason: SDUPTHER

## 2022-10-25 ENCOUNTER — SPECIALTY PHARMACY (OUTPATIENT)
Dept: ENDOCRINOLOGY | Age: 42
End: 2022-10-25

## 2022-10-25 NOTE — PROGRESS NOTES
Specialty Pharmacy Refill Coordination Note     Cora is a 42 y.o. female contacted today regarding refills of  2 specialty medication(s).    Reviewed and verified with patient:       Specialty medication(s) and dose(s) confirmed: yes    Refill Questions    Flowsheet Row Most Recent Value   Changes to allergies? No   Changes to medications? No   New conditions since last clinic visit No   Unplanned office visit, urgent care, ED, or hospital admission in the last 4 weeks  No   How does patient/caregiver feel medication is working? Very good   Financial problems or insurance changes  No   If yes, describe changes in insurance or financial issues. n/a   Since the previous refill, were any specialty medication doses or scheduled injections missed or delayed?  No   If yes, please provide the amount n/a   Why were doses missed? n/a   Does this patient require a clinical escalation to a pharmacist? No          Delivery Questions    Flowsheet Row Most Recent Value   Delivery method Other (Comment)  [beeline 10/28 for 10/31]   Delivery address correct? Yes   Preferred delivery time? Anytime   Number of medications in delivery 2   Medication being filled and delivered rosuvastatin, xigduo   Doses left of specialty medications 1 week   Is there any medication that is due not being filled? No   Supplies needed? No supplies needed   Cooler needed? No   Do any medications need mixed or dated? No   Copay form of payment Credit card on file   Additional comments 9.35   Questions or concerns for the pharmacist? No   Explain any questions or concerns for the pharmacist dexcom approval too soon until 11/2 will schedule   Are any medications first time fills? No            Medication Adherence    Adherence tools used: patient uses a pill box to manage medications  Support network for adherence: family member, healthcare provider          Follow-up: 1 month(s)     Bhavani Cunningham  Specialty Pharmacy Technician

## 2022-10-27 RX ORDER — INSULIN GLARGINE 100 [IU]/ML
INJECTION, SOLUTION SUBCUTANEOUS
Qty: 45 ML | Refills: 3 | Status: SHIPPED | OUTPATIENT
Start: 2022-10-27 | End: 2022-11-30

## 2022-10-27 NOTE — TELEPHONE ENCOUNTER
Rx Refill Note  Requested Prescriptions     Pending Prescriptions Disp Refills    Insulin Glargine (BASAGLAR KWIKPEN) 100 UNIT/ML injection pen [Pharmacy Med Name: BASAGLAR 100 UNIT/ML KWIKPEN] 45 mL 3     Sig: INJECT 60 UNITS UNDER THE SKIN INTO APPROPRIATE AREA AS DIRECTED EVERY NIGHT      Last office visit with prescribing clinician: 5/9/2022      Next office visit with prescribing clinician: 7/10/2023            Monika Scott MA  10/27/22, 09:04 EDT

## 2022-11-02 ENCOUNTER — SPECIALTY PHARMACY (OUTPATIENT)
Dept: ENDOCRINOLOGY | Age: 42
End: 2022-11-02

## 2022-11-02 NOTE — TELEPHONE ENCOUNTER
Rx Refill Note  Requested Prescriptions     Pending Prescriptions Disp Refills    Continuous Blood Gluc Transmit (Dexcom G6 Transmitter) misc 1 each 4     Sig: USE AS DIRECTED AND REPALCE EVERY THREE MONTHS    Continuous Blood Gluc Sensor (Dexcom G6 Sensor) 9 each 1     Sig: Apply  topically Every 10 (Ten) Days for 90 days.      Last office visit with prescribing clinician: 5/9/2022      Next office visit with prescribing clinician: 7/10/2023            Bhavani Cunningham  11/02/22, 13:04 EDT

## 2022-11-02 NOTE — PROGRESS NOTES
DEXCOM PRICE CHECK NO COPAY. LEFT VOICEMAIL FOR PATIENT TO CALL BACK AND SENT REFILL REQUEST TO PROVIDER

## 2022-11-03 RX ORDER — PROCHLORPERAZINE 25 MG/1
SUPPOSITORY RECTAL
Qty: 1 EACH | Refills: 4 | Status: SHIPPED | OUTPATIENT
Start: 2022-11-03

## 2022-11-03 RX ORDER — PROCHLORPERAZINE 25 MG/1
SUPPOSITORY RECTAL
Qty: 9 EACH | Refills: 1 | Status: SHIPPED | OUTPATIENT
Start: 2022-11-03 | End: 2023-04-22

## 2022-11-08 ENCOUNTER — SPECIALTY PHARMACY (OUTPATIENT)
Dept: ENDOCRINOLOGY | Age: 42
End: 2022-11-08

## 2022-11-08 NOTE — PROGRESS NOTES
Specialty Pharmacy Refill Coordination Note     Cora is a 42 y.o. female contacted today regarding refills of  2 specialty medication(s).    Reviewed and verified with patient:       Specialty medication(s) and dose(s) confirmed: yes    Refill Questions    Flowsheet Row Most Recent Value   Changes to allergies? No   Changes to medications? No   New conditions since last clinic visit No   Unplanned office visit, urgent care, ED, or hospital admission in the last 4 weeks  No   How does patient/caregiver feel medication is working? Very good   Financial problems or insurance changes  No   If yes, describe changes in insurance or financial issues. N/A   Since the previous refill, were any specialty medication doses or scheduled injections missed or delayed?  No   If yes, please provide the amount N/A   Why were doses missed? N/A   Does this patient require a clinical escalation to a pharmacist? No          Delivery Questions    Flowsheet Row Most Recent Value   Delivery method Other (Comment)  [BEELINE 11/15 FOR 11/16]   Delivery address correct? Yes   Preferred delivery time? Anytime   Number of medications in delivery 2   Medication being filled and delivered VASCEPA, FENOFIBRATE   Doses left of specialty medications 1 WEEK   Is there any medication that is due not being filled? No   Supplies needed? No supplies needed   Cooler needed? No   Do any medications need mixed or dated? No   Copay form of payment Credit card on file   Additional comments 23.10   Questions or concerns for the pharmacist? No   Explain any questions or concerns for the pharmacist N/A   Are any medications first time fills? No            Medication Adherence    Adherence tools used: patient uses a pill box to manage medications  Support network for adherence: family member, healthcare provider          Follow-up: 1 month(s)     Bhavani Cunningham  Specialty Pharmacy Technician

## 2022-11-17 RX ORDER — INSULIN ASPART 100 [IU]/ML
120 INJECTION, SOLUTION INTRAVENOUS; SUBCUTANEOUS DAILY
Qty: 45 ML | Refills: 0 | Status: SHIPPED | OUTPATIENT
Start: 2022-11-17

## 2022-11-22 RX ORDER — INSULIN PMP CART,AUT,G6/7,CNTR
1 EACH SUBCUTANEOUS
Qty: 15 EACH | Refills: 0 | Status: SHIPPED | OUTPATIENT
Start: 2022-11-22 | End: 2022-12-29

## 2022-11-28 ENCOUNTER — SPECIALTY PHARMACY (OUTPATIENT)
Dept: ENDOCRINOLOGY | Age: 42
End: 2022-11-28

## 2022-11-28 NOTE — PROGRESS NOTES
Specialty Pharmacy Refill Coordination Note     Cora is a 42 y.o. female contacted today regarding refills of  3 specialty medication(s).    Reviewed and verified with patient:       Specialty medication(s) and dose(s) confirmed: yes    Refill Questions    Flowsheet Row Most Recent Value   Changes to allergies? No   Changes to medications? No   New conditions since last clinic visit No   Unplanned office visit, urgent care, ED, or hospital admission in the last 4 weeks  No   How does patient/caregiver feel medication is working? Very good   Financial problems or insurance changes  No   If yes, describe changes in insurance or financial issues. n/a   Since the previous refill, were any specialty medication doses or scheduled injections missed or delayed?  No   If yes, please provide the amount n/a   Why were doses missed? n/a   Does this patient require a clinical escalation to a pharmacist? No          Delivery Questions    Flowsheet Row Most Recent Value   Delivery method Other (Comment)  [beeline 11/30]   Delivery address correct? Yes   Preferred delivery time? Anytime   Number of medications in delivery 3   Medication being filled and delivered sensor, transmitter, rosuvastatin   Doses left of specialty medications 1 week   Is there any medication that is due not being filled? No   Supplies needed? No supplies needed   Cooler needed? No   Do any medications need mixed or dated? No   Copay form of payment Credit card on file   Additional comments 20   Questions or concerns for the pharmacist? No   Explain any questions or concerns for the pharmacist n/a   Are any medications first time fills? No            Medication Adherence    Adherence tools used: patient uses a pill box to manage medications  Support network for adherence: family member, healthcare provider          Follow-up: 1 month(s)     Bhavani Cunningham  Specialty Pharmacy Technician

## 2022-11-30 ENCOUNTER — OFFICE VISIT (OUTPATIENT)
Dept: FAMILY MEDICINE CLINIC | Facility: CLINIC | Age: 42
End: 2022-11-30

## 2022-11-30 VITALS
WEIGHT: 230 LBS | BODY MASS INDEX: 40.75 KG/M2 | DIASTOLIC BLOOD PRESSURE: 70 MMHG | OXYGEN SATURATION: 98 % | SYSTOLIC BLOOD PRESSURE: 112 MMHG | HEART RATE: 79 BPM | HEIGHT: 63 IN | RESPIRATION RATE: 18 BRPM

## 2022-11-30 DIAGNOSIS — E78.1 HYPERTRIGLYCERIDEMIA: ICD-10-CM

## 2022-11-30 DIAGNOSIS — Z00.00 PREVENTATIVE HEALTH CARE: ICD-10-CM

## 2022-11-30 DIAGNOSIS — Z79.4 TYPE 2 DIABETES MELLITUS WITH HYPERGLYCEMIA, WITH LONG-TERM CURRENT USE OF INSULIN: ICD-10-CM

## 2022-11-30 DIAGNOSIS — F32.A DEPRESSION, UNSPECIFIED DEPRESSION TYPE: ICD-10-CM

## 2022-11-30 DIAGNOSIS — E11.65 TYPE 2 DIABETES MELLITUS WITH HYPERGLYCEMIA, WITH LONG-TERM CURRENT USE OF INSULIN: ICD-10-CM

## 2022-11-30 DIAGNOSIS — Z11.59 ENCOUNTER FOR HEPATITIS C SCREENING TEST FOR LOW RISK PATIENT: Primary | ICD-10-CM

## 2022-11-30 PROBLEM — E78.5 HYPERLIPIDEMIA: Status: ACTIVE | Noted: 2022-11-30

## 2022-11-30 PROBLEM — E11.9 TYPE 2 DIABETES MELLITUS (HCC): Status: ACTIVE | Noted: 2022-11-30

## 2022-11-30 PROBLEM — E03.9 HYPOTHYROIDISM: Status: ACTIVE | Noted: 2022-11-30

## 2022-11-30 PROCEDURE — 99396 PREV VISIT EST AGE 40-64: CPT | Performed by: NURSE PRACTITIONER

## 2022-11-30 RX ORDER — ESCITALOPRAM OXALATE 10 MG/1
10 TABLET ORAL DAILY
Qty: 90 TABLET | Refills: 3 | Status: SHIPPED | OUTPATIENT
Start: 2022-11-30

## 2022-11-30 RX ORDER — INSULIN PMP CART,AUT,G6/7,CNTR
EACH SUBCUTANEOUS
COMMUNITY
Start: 2022-11-08 | End: 2023-03-18

## 2022-11-30 RX ORDER — OMEGA-3 FATTY ACIDS/FISH OIL 300-1000MG
200 CAPSULE ORAL EVERY 6 HOURS PRN
COMMUNITY

## 2022-11-30 NOTE — PROGRESS NOTES
Subjective   Cora Gutiérrez is a 42 y.o. female.     History of Present Illness   Established patient, new to this provider.     Here for chronic illness mgmnt. Annual exam, Med refills. Updated screenings     Sees jessica for Dm2 and hypertriglyceridemia. Reviewed A1c. 11/30/22 7.9, trig 265. On xigduo 5-1000, fenofibrate 145mg, crestor 40, vascepa 1gm.. Complicated by BMI 40. She has not had foot exam.      Chronic depression x years. PHQ-9 Depression Screening  Little interest or pleasure in doing things? 0-->not at all   Feeling down, depressed, or hopeless? 1-->several days   Trouble falling or staying asleep, or sleeping too much?     Feeling tired or having little energy?     Poor appetite or overeating?     Feeling bad about yourself - or that you are a failure or have let yourself or your family down?     Trouble concentrating on things, such as reading the newspaper or watching television?     Moving or speaking so slowly that other people could have noticed? Or the opposite - being so fidgety or restless that you have been moving around a lot more than usual?     Thoughts that you would be better off dead, or of hurting yourself in some way?     PHQ-9 Total Score 1   If you checked off any problems, how difficult have these problems made it for you to do your work, take care of things at home, or get along with other people?     She is on escitalopram 10 mg qd. Denies Si/Hi. Needs refill. Denies med s.e.      The following portions of the patient's history were reviewed and updated as appropriate: allergies, current medications, past family history, past medical history, past social history, past surgical history and problem list.    Review of Systems   Constitutional: Negative for activity change, diaphoresis, fatigue, unexpected weight gain and unexpected weight loss.   HENT: Negative for congestion and postnasal drip.         Dental exam is due      Eyes: Negative for blurred vision, double vision and  visual disturbance.        Glasses, Dm eye is due      Respiratory: Negative for cough, chest tightness, shortness of breath and wheezing.    Cardiovascular: Negative for chest pain, palpitations and leg swelling.   Gastrointestinal: Positive for GERD. Negative for abdominal pain, blood in stool, constipation and diarrhea.   Endocrine: Positive for cold intolerance. Negative for heat intolerance, polydipsia, polyphagia and polyuria.   Genitourinary: Negative for breast lump, breast pain, dysuria, frequency, menstrual problem, pelvic pain, pelvic pressure and vaginal discharge.   Musculoskeletal: Positive for back pain. Negative for arthralgias.   Skin: Negative for wound.   Allergic/Immunologic: Negative for environmental allergies.   Neurological: Negative for dizziness, seizures, syncope and headache.   Hematological: Does not bruise/bleed easily.   Psychiatric/Behavioral: Positive for depressed mood. Negative for sleep disturbance. The patient is nervous/anxious.        Objective   Physical Exam  Vitals reviewed.   Constitutional:       Appearance: Normal appearance. She is obese.   HENT:      Head: Normocephalic.      Right Ear: Tympanic membrane normal.      Left Ear: Tympanic membrane normal.      Nose: Nose normal.      Mouth/Throat:      Mouth: Mucous membranes are moist.   Eyes:      Pupils: Pupils are equal, round, and reactive to light.   Cardiovascular:      Rate and Rhythm: Normal rate and regular rhythm.      Pulses: Normal pulses.           Dorsalis pedis pulses are 2+ on the right side and 2+ on the left side.        Posterior tibial pulses are 2+ on the right side and 2+ on the left side.      Heart sounds: Normal heart sounds.   Pulmonary:      Effort: Pulmonary effort is normal.      Breath sounds: Normal breath sounds.   Abdominal:      General: Bowel sounds are normal.      Palpations: Abdomen is soft.   Musculoskeletal:         General: Normal range of motion.      Cervical back: Normal range  of motion.   Feet:      Right foot:      Protective Sensation: 4 sites tested. 4 sites sensed.      Skin integrity: Callus present.      Toenail Condition: Right toenails are normal.      Left foot:      Protective Sensation: 4 sites tested. 4 sites sensed.      Skin integrity: Callus present.      Toenail Condition: Left toenails are normal.   Skin:     General: Skin is warm.   Neurological:      General: No focal deficit present.      Mental Status: She is alert.   Psychiatric:         Mood and Affect: Mood is anxious.         Vitals:    11/30/22 1054   BP: 112/70   Pulse: 79   Resp: 18   SpO2: 98%     Body mass index is 40.74 kg/m².    Procedures    Assessment & Plan   Problems Addressed this Visit        Cardiac and Vasculature    Hypertriglyceridemia       Endocrine and Metabolic    Type 2 diabetes mellitus (HCC)       Mental Health    Depression    Relevant Medications    escitalopram (LEXAPRO) 10 MG tablet    Other Relevant Orders    Comprehensive Metabolic Panel   Other Visit Diagnoses     Encounter for hepatitis C screening test for low risk patient    -  Primary    Relevant Orders    Hepatitis C Antibody    Preventative health care          Diagnoses       Codes Comments    Encounter for hepatitis C screening test for low risk patient    -  Primary ICD-10-CM: Z11.59  ICD-9-CM: V73.89     Preventative health care     ICD-10-CM: Z00.00  ICD-9-CM: V70.0     Depression, unspecified depression type     ICD-10-CM: F32.A  ICD-9-CM: 311     Type 2 diabetes mellitus with hyperglycemia, with long-term current use of insulin (HCC)     ICD-10-CM: E11.65, Z79.4  ICD-9-CM: 250.00, 790.29, V58.67     Hypertriglyceridemia     ICD-10-CM: E78.1  ICD-9-CM: 272.1         Orders Placed This Encounter   Procedures   • Hepatitis C Antibody     Standing Status:   Future     Standing Expiration Date:   11/30/2023     Order Specific Question:   Release to patient     Answer:   Routine Release   • Comprehensive Metabolic Panel      Standing Status:   Future     Standing Expiration Date:   11/30/2023     Order Specific Question:   Release to patient     Answer:   Routine Release     Preventative care- Follow heart healthy diet, drink water, walk daily. Wear seatbelts, wear helmets, wear sunscreens. Follow CDC guidelines for covid pandemic.      Hypetrigs- follow heart healthy diet , walking, increase fiber, cw meds    DM2- cw meds, follow w endo, foot exam done today. Keep feet clean and dry, inspect daily    Depression- refill escitalopram, 988 or ER for Si/HI worsening sx, take meds daily, walk, follow heart healthy diet           Education provided in AVS   Return in about 6 months (around 5/30/2023).

## 2022-12-08 ENCOUNTER — SPECIALTY PHARMACY (OUTPATIENT)
Dept: ENDOCRINOLOGY | Age: 42
End: 2022-12-08

## 2022-12-08 NOTE — PROGRESS NOTES
Specialty Pharmacy Refill Coordination Note     Cora is a 42 y.o. female contacted today regarding refills of  2 specialty medication(s).    Reviewed and verified with patient:       Specialty medication(s) and dose(s) confirmed: yes    Refill Questions    Flowsheet Row Most Recent Value   Changes to allergies? No   Changes to medications? No   New conditions since last clinic visit No   Unplanned office visit, urgent care, ED, or hospital admission in the last 4 weeks  No   How does patient/caregiver feel medication is working? Very good   Financial problems or insurance changes  No   If yes, describe changes in insurance or financial issues. n/a   Since the previous refill, were any specialty medication doses or scheduled injections missed or delayed?  No   If yes, please provide the amount n/a   Why were doses missed? n/a   Does this patient require a clinical escalation to a pharmacist? No          Delivery Questions    Flowsheet Row Most Recent Value   Delivery method Other (Comment)  [beeline 12/15]   Delivery address correct? Yes   Preferred delivery time? Anytime   Number of medications in delivery 3   Medication being filled and delivered xigduo, vascepa, fenofibrate   Doses left of specialty medications 1 week   Is there any medication that is due not being filled? No   Supplies needed? No supplies needed   Cooler needed? No   Do any medications need mixed or dated? No   Copay form of payment Credit card on file   Additional comments 29   Questions or concerns for the pharmacist? No   Explain any questions or concerns for the pharmacist n/a   Are any medications first time fills? No            Medication Adherence    Adherence tools used: patient uses a pill box to manage medications  Support network for adherence: family member, healthcare provider          Follow-up: 1 month(s)     Bhavani Cunningham  Specialty Pharmacy Technician

## 2022-12-16 ENCOUNTER — SPECIALTY PHARMACY (OUTPATIENT)
Dept: ENDOCRINOLOGY | Age: 42
End: 2022-12-16

## 2022-12-16 ENCOUNTER — OFFICE VISIT (OUTPATIENT)
Dept: ENDOCRINOLOGY | Age: 42
End: 2022-12-16

## 2022-12-16 VITALS
SYSTOLIC BLOOD PRESSURE: 120 MMHG | DIASTOLIC BLOOD PRESSURE: 80 MMHG | TEMPERATURE: 96.8 F | WEIGHT: 234.6 LBS | HEIGHT: 63 IN | BODY MASS INDEX: 41.57 KG/M2 | OXYGEN SATURATION: 97 % | HEART RATE: 77 BPM

## 2022-12-16 DIAGNOSIS — E11.65 TYPE 2 DIABETES MELLITUS WITH HYPERGLYCEMIA, WITH LONG-TERM CURRENT USE OF INSULIN: Primary | ICD-10-CM

## 2022-12-16 DIAGNOSIS — Z79.4 TYPE 2 DIABETES MELLITUS WITH HYPERGLYCEMIA, WITH LONG-TERM CURRENT USE OF INSULIN: Primary | ICD-10-CM

## 2022-12-16 DIAGNOSIS — E03.9 ACQUIRED HYPOTHYROIDISM: ICD-10-CM

## 2022-12-16 DIAGNOSIS — E55.9 VITAMIN D DEFICIENCY: ICD-10-CM

## 2022-12-16 DIAGNOSIS — E78.2 HYPERLIPEMIA, MIXED: ICD-10-CM

## 2022-12-16 PROCEDURE — 99214 OFFICE O/P EST MOD 30 MIN: CPT | Performed by: NURSE PRACTITIONER

## 2022-12-16 PROCEDURE — 95251 CONT GLUC MNTR ANALYSIS I&R: CPT | Performed by: NURSE PRACTITIONER

## 2022-12-16 RX ORDER — ERGOCALCIFEROL 1.25 MG/1
50000 CAPSULE ORAL
Qty: 30 CAPSULE | Refills: 0 | Status: SHIPPED | OUTPATIENT
Start: 2022-12-16

## 2022-12-16 RX ORDER — ERGOCALCIFEROL 1.25 MG/1
50000 CAPSULE ORAL
Qty: 30 CAPSULE | Refills: 0 | Status: SHIPPED | OUTPATIENT
Start: 2022-12-16 | End: 2022-12-16

## 2022-12-16 NOTE — PROGRESS NOTES
"Chief Complaint  Diabetes (Type2: Patient uses omnipod 5, she has no hx of retinopathy or neuropathy tied to her dx )    Subjective        Cora Gutiérrez presents to [facility]  History of Present Illness     Really allergic to trulicity, also Hypertriglyceridemia with hospitalization because of the triglycerides and sustained pancreatitis     I have reviewed PMH, allergies and medications UTD at this visit      Type 2 dm    Diagnosed about 6 years ago.   Today in clinic pt reports being on omnipod 5, xigduo 5-1000 daily  Automated insulin delivery review:  66% time in range  0% low  34% high  48% basal, 52% bolus  Total daily dose 79 units  Automated mode 100% of the day  Blood sugars trend highest from 7 PM to 2 AM  Last eye exam - \" I need to get one\"  Dm nephropathy - denies GFR 80  Dm neuropathy - denies numbness  CAD - denies CP  CVA - denies   Episodes of hypoglycemia - none reported   Trying to work on portion control   Goes to the gym        Objective   Vital Signs:  /80   Pulse 77   Temp 96.8 °F (36 °C) (Temporal)   Ht 160 cm (63\")   Wt 106 kg (234 lb 9.6 oz)   SpO2 97%   BMI 41.56 kg/m²   Estimated body mass index is 41.56 kg/m² as calculated from the following:    Height as of this encounter: 160 cm (63\").    Weight as of this encounter: 106 kg (234 lb 9.6 oz).          Physical Exam  Vitals reviewed.   Constitutional:       General: She is not in acute distress.  HENT:      Head: Normocephalic and atraumatic.   Eyes:      General:         Right eye: No discharge.         Left eye: No discharge.   Pulmonary:      Effort: Pulmonary effort is normal. No respiratory distress.   Musculoskeletal:         General: No deformity or signs of injury. Normal range of motion.      Cervical back: Normal range of motion and neck supple.   Skin:     General: Skin is warm and dry.   Neurological:      Mental Status: She is alert and oriented to person, place, and time. " Mental status is at baseline.   Psychiatric:         Mood and Affect: Mood normal.         Behavior: Behavior normal.         Thought Content: Thought content normal.         Judgment: Judgment normal.        Result Review :  The following data was reviewed by: PORSHA Shaw on 12/16/2022:  Common labs    Common Labs 1/19/22 1/19/22 1/19/22 1/19/22 4/25/22 4/25/22 4/25/22 11/30/22 11/30/22 11/30/22    0921 0921 0921 0921 0918 0918 0918 0853 0853 0853   Glucose 118 (A)     126 (A)  120 (A)     BUN 14     15  13     Creatinine 0.85     0.82  0.92     eGFR Non African Am 86            eGFR  Am 99            Sodium 138     139  140     Potassium 4.5     4.3  4.5     Chloride 102     102  103     Calcium 9.3     8.9  9.3     Total Cholesterol   165  168     161   Triglycerides   233 (A)  340 (A)     265 (A)   HDL Cholesterol   41  38 (A)     35 (A)   LDL Cholesterol    85  75     82   Hemoglobin A1C  7.9 (A)     8.6 (A)  7.90 (A)    Microalbumin, Urine    5.1         (A) Abnormal value       Comments are available for some flowsheets but are not being displayed.                     Assessment and Plan   Diagnoses and all orders for this visit:    1. Type 2 diabetes mellitus with hyperglycemia, with long-term current use of insulin (HCC) (Primary)  -     Hemoglobin A1c; Future  -     Vitamin D,25-Hydroxy; Future    2. Hyperlipemia, mixed  -     Hemoglobin A1c; Future  -     Vitamin D,25-Hydroxy; Future    3. Acquired hypothyroidism  -     Hemoglobin A1c; Future  -     Vitamin D,25-Hydroxy; Future    4. Vitamin D deficiency    Other orders  -     Discontinue: vitamin D (ERGOCALCIFEROL) 1.25 MG (15513 UT) capsule capsule; Take 1 capsule by mouth Every 7 (Seven) Days.  Dispense: 30 capsule; Refill: 0  -     vitamin D (ERGOCALCIFEROL) 1.25 MG (85853 UT) capsule capsule; Take 1 capsule by mouth Every 7 (Seven) Days.  Dispense: 30 capsule; Refill: 0             Follow Up   Return in about 3 months (around  "3/16/2023).     A1c improving however still above target range  2 hours after eating, go to bolus, leave carbs at zero, and use the \" use CGM\" function to take a correction bolus to help with high blood sugar   Add vitamin d 50,000u once weekly   Thyroid labs improved, continue current T4 dose at 175 mcg    Patient was given instructions and counseling regarding her condition or for health maintenance advice. Please see specific information pulled into the AVS if appropriate.     Thais Wheatley, APRN    "

## 2022-12-16 NOTE — PATIENT INSTRUCTIONS
"2 hours after eating, go to bolus, leave carbs at zero, and use the \" use CGM\" function to take a correction bolus to help with high blood sugar   "

## 2022-12-19 ENCOUNTER — SPECIALTY PHARMACY (OUTPATIENT)
Dept: ENDOCRINOLOGY | Age: 42
End: 2022-12-19

## 2022-12-20 ENCOUNTER — SPECIALTY PHARMACY (OUTPATIENT)
Dept: ENDOCRINOLOGY | Age: 42
End: 2022-12-20

## 2022-12-20 NOTE — PROGRESS NOTES
Specialty Pharmacy Refill Coordination Note     Cora is a 42 y.o. female contacted today regarding refills of  1 specialty medication(s).    Reviewed and verified with patient:       Specialty medication(s) and dose(s) confirmed: yes        Delivery Questions    Flowsheet Row Most Recent Value   Delivery method Other (Comment)  [beeline 12/21]   Delivery address correct? Yes   Preferred delivery time? Anytime   Number of medications in delivery 1   Medication being filled and delivered vit d   Doses left of specialty medications 0   Is there any medication that is due not being filled? No   Supplies needed? No supplies needed   Cooler needed? No   Do any medications need mixed or dated? No   Copay form of payment Credit card on file   Additional comments 2.26   Questions or concerns for the pharmacist? No   Explain any questions or concerns for the pharmacist n/a   Are any medications first time fills? Yes            Medication Adherence    Adherence tools used: patient uses a pill box to manage medications  Support network for adherence: family member, healthcare provider          Follow-up: 25 day(s)     Bhavani Cunningham  Specialty Pharmacy Technician

## 2022-12-27 ENCOUNTER — SPECIALTY PHARMACY (OUTPATIENT)
Dept: ENDOCRINOLOGY | Age: 42
End: 2022-12-27

## 2022-12-27 NOTE — PROGRESS NOTES
Specialty Pharmacy Refill Coordination Note     Cora is a 42 y.o. female contacted today regarding refills of  2 specialty medication(s).    Reviewed and verified with patient:       Specialty medication(s) and dose(s) confirmed: yes    Refill Questions    Flowsheet Row Most Recent Value   Changes to allergies? No   Changes to medications? No   New conditions since last clinic visit No   Unplanned office visit, urgent care, ED, or hospital admission in the last 4 weeks  No   How does patient/caregiver feel medication is working? Very good   Financial problems or insurance changes  No   If yes, describe changes in insurance or financial issues. n/a   Since the previous refill, were any specialty medication doses or scheduled injections missed or delayed?  No   If yes, please provide the amount n/a   Why were doses missed? n/a   Does this patient require a clinical escalation to a pharmacist? No          Delivery Questions    Flowsheet Row Most Recent Value   Delivery method Other (Comment)  [beeline 12/29]   Number of medications in delivery 2   Medication being filled and delivered sensor, rosuvastatin   Doses left of specialty medications 1 week   Is there any medication that is due not being filled? No   Supplies needed? No supplies needed   Cooler needed? No   Do any medications need mixed or dated? No   Copay form of payment Credit card on file   Additional comments 20   Questions or concerns for the pharmacist? No   Explain any questions or concerns for the pharmacist n/a   Are any medications first time fills? No            Medication Adherence    Adherence tools used: patient uses a pill box to manage medications  Support network for adherence: family member, healthcare provider          Follow-up: 25 day(s)     Bhavani Cunningham  Specialty Pharmacy Technician

## 2022-12-29 RX ORDER — INSULIN PMP CART,AUT,G6/7,CNTR
EACH SUBCUTANEOUS
Qty: 45 EACH | Refills: 0 | Status: SHIPPED | OUTPATIENT
Start: 2022-12-29 | End: 2023-04-06

## 2023-01-10 DIAGNOSIS — E78.2 HYPERLIPEMIA, MIXED: ICD-10-CM

## 2023-01-10 RX ORDER — ICOSAPENT ETHYL 1000 MG/1
2 CAPSULE ORAL 2 TIMES DAILY WITH MEALS
Qty: 360 CAPSULE | Refills: 1 | Status: SHIPPED | OUTPATIENT
Start: 2023-01-10

## 2023-01-13 ENCOUNTER — SPECIALTY PHARMACY (OUTPATIENT)
Dept: ENDOCRINOLOGY | Age: 43
End: 2023-01-13
Payer: COMMERCIAL

## 2023-01-13 NOTE — PROGRESS NOTES
Specialty Pharmacy Refill Coordination Note     Cora is a 42 y.o. female contacted today regarding refills of  3 specialty medication(s).    Reviewed and verified with patient:       Specialty medication(s) and dose(s) confirmed: yes    Refill Questions    Flowsheet Row Most Recent Value   Changes to allergies? No   Changes to medications? No   New conditions since last clinic visit No   Unplanned office visit, urgent care, ED, or hospital admission in the last 4 weeks  No   How does patient/caregiver feel medication is working? Very good   Financial problems or insurance changes  No   If yes, describe changes in insurance or financial issues. n/a   Since the previous refill, were any specialty medication doses or scheduled injections missed or delayed?  No   If yes, please provide the amount n/a   Why were doses missed? n/a   Does this patient require a clinical escalation to a pharmacist? No          Delivery Questions    Flowsheet Row Most Recent Value   Delivery method Other (Comment)  [beeline 1/16]   Delivery address correct? Yes   Preferred delivery time? Anytime   Number of medications in delivery 3   Medication being filled and delivered fenofibrate, vascepa, vit d   Doses left of specialty medications 1 week   Is there any medication that is due not being filled? No   Supplies needed? No supplies needed   Cooler needed? No   Do any medications need mixed or dated? No   Copay form of payment Credit card on file   Additional comments 20.13   Questions or concerns for the pharmacist? No   Explain any questions or concerns for the pharmacist n/a   Are any medications first time fills? No            Medication Adherence    Adherence tools used: patient uses a pill box to manage medications  Support network for adherence: family member, healthcare provider          Follow-up: 25 day(s)     Bhavani Cunningham  Specialty Pharmacy Technician

## 2023-01-23 ENCOUNTER — SPECIALTY PHARMACY (OUTPATIENT)
Dept: ENDOCRINOLOGY | Age: 43
End: 2023-01-23
Payer: COMMERCIAL

## 2023-01-23 NOTE — PROGRESS NOTES
Specialty Pharmacy Refill Coordination Note     Cora is a 42 y.o. female contacted today regarding refills of  2 specialty medication(s).    Reviewed and verified with patient:       Specialty medication(s) and dose(s) confirmed: yes    Refill Questions    Flowsheet Row Most Recent Value   Changes to allergies? No   Changes to medications? No   New conditions since last clinic visit No   Unplanned office visit, urgent care, ED, or hospital admission in the last 4 weeks  No   How does patient/caregiver feel medication is working? Very good   Financial problems or insurance changes  No   If yes, describe changes in insurance or financial issues. n/a   Since the previous refill, were any specialty medication doses or scheduled injections missed or delayed?  No   If yes, please provide the amount n/a   Why were doses missed? n/a   Does this patient require a clinical escalation to a pharmacist? No          Delivery Questions    Flowsheet Row Most Recent Value   Delivery method Other (Comment)  [beeline 1/24]   Delivery address correct? Yes   Preferred delivery time? Anytime   Number of medications in delivery 2   Medication being filled and delivered sensor, rosuvastatin   Doses left of specialty medications 1 week   Is there any medication that is due not being filled? No   Supplies needed? No supplies needed   Cooler needed? No   Do any medications need mixed or dated? No   Copay form of payment Credit card on file   Additional comments 7.66   Questions or concerns for the pharmacist? No   Explain any questions or concerns for the pharmacist n/a   Are any medications first time fills? No            Medication Adherence    Adherence tools used: patient uses a pill box to manage medications  Support network for adherence: family member, healthcare provider          Follow-up: 25 day(s)     Bhavani Cunningham  Specialty Pharmacy Technician

## 2023-02-01 RX ORDER — NORGESTIMATE AND ETHINYL ESTRADIOL 7DAYSX3 28
1 KIT ORAL DAILY
Qty: 84 TABLET | Refills: 3 | Status: SHIPPED | OUTPATIENT
Start: 2023-02-01

## 2023-02-06 ENCOUNTER — SPECIALTY PHARMACY (OUTPATIENT)
Dept: ENDOCRINOLOGY | Age: 43
End: 2023-02-06
Payer: COMMERCIAL

## 2023-02-06 NOTE — PROGRESS NOTES
Specialty Pharmacy Refill Coordination Note     Cora is a 42 y.o. female contacted today regarding refills of  1 specialty medication(s).    Reviewed and verified with patient:       Specialty medication(s) and dose(s) confirmed: yes    Refill Questions    Flowsheet Row Most Recent Value   Changes to allergies? No   Changes to medications? No   New conditions since last clinic visit No   Unplanned office visit, urgent care, ED, or hospital admission in the last 4 weeks  No   How does patient/caregiver feel medication is working? Very good   Financial problems or insurance changes  No   If yes, describe changes in insurance or financial issues. N/A   Since the previous refill, were any specialty medication doses or scheduled injections missed or delayed?  No   If yes, please provide the amount N/A   Why were doses missed? N/A   Does this patient require a clinical escalation to a pharmacist? No          Delivery Questions    Flowsheet Row Most Recent Value   Delivery method Other (Comment)  [BEELINE 1/31]   Delivery address correct? Yes   Preferred delivery time? Anytime   Number of medications in delivery 1   Medication being filled and delivered XIGDUO   Doses left of specialty medications 1 WEEK   Is there any medication that is due not being filled? No   Supplies needed? No supplies needed   Cooler needed? No   Do any medications need mixed or dated? No   Additional comments 0   Questions or concerns for the pharmacist? No   Explain any questions or concerns for the pharmacist N/A   Are any medications first time fills? No   Shipment status Delivery complete            Medication Adherence    Adherence tools used: patient uses a pill box to manage medications  Support network for adherence: family member, healthcare provider          Follow-up: 55 day(s)     Bhavani Cunningham  Specialty Pharmacy Technician

## 2023-02-16 RX ORDER — LEVOTHYROXINE SODIUM 175 MCG
TABLET ORAL
Qty: 90 TABLET | Refills: 1 | Status: SHIPPED | OUTPATIENT
Start: 2023-02-16

## 2023-02-20 ENCOUNTER — SPECIALTY PHARMACY (OUTPATIENT)
Dept: ENDOCRINOLOGY | Age: 43
End: 2023-02-20
Payer: COMMERCIAL

## 2023-02-20 RX ORDER — FAMOTIDINE 40 MG/1
40 TABLET, FILM COATED ORAL EVERY EVENING
Qty: 90 TABLET | Refills: 0 | OUTPATIENT
Start: 2023-02-20 | End: 2023-05-21

## 2023-02-20 RX ORDER — FENOFIBRATE 145 MG/1
145 TABLET, COATED ORAL DAILY
Qty: 90 TABLET | Refills: 0 | Status: SHIPPED | OUTPATIENT
Start: 2023-02-20

## 2023-02-20 RX ORDER — FENOFIBRATE 145 MG/1
145 TABLET, COATED ORAL DAILY
Qty: 90 TABLET | Refills: 0 | Status: SHIPPED | OUTPATIENT
Start: 2023-02-20 | End: 2023-02-20 | Stop reason: SDUPTHER

## 2023-02-20 NOTE — TELEPHONE ENCOUNTER
Famotodine was filled by historical provider.     Last office visit: 12/16/22      Follow up on file: 4/17/23

## 2023-02-20 NOTE — TELEPHONE ENCOUNTER
Patient requested Fenofibrate to be sent to [facility] pharmacy so it can be delivered    Last: 12/16/22 Corry   Next: 4/17/23 Corry     Thank you!

## 2023-02-20 NOTE — PROGRESS NOTES
Specialty Pharmacy Refill Coordination Note     Cora is a 43 y.o. female contacted today regarding refills of  5 specialty medication(s).    Reviewed and verified with patient:       Specialty medication(s) and dose(s) confirmed: yes    Refill Questions    Flowsheet Row Most Recent Value   Changes to allergies? No   Changes to medications? No   New conditions since last clinic visit No   Unplanned office visit, urgent care, ED, or hospital admission in the last 4 weeks  No   How does patient/caregiver feel medication is working? Very good   If yes, describe changes in insurance or financial issues. N/A   Since the previous refill, were any specialty medication doses or scheduled injections missed or delayed?  No   If yes, please provide the amount N/A   Why were doses missed? N/A   Does this patient require a clinical escalation to a pharmacist? No          Delivery Questions    Flowsheet Row Most Recent Value   Delivery method Other (Comment)  [BEELINE 2/21]   Delivery address correct? Yes   Preferred delivery time? Anytime   Number of medications in delivery 6   Medication being filled and delivered SENSOR, TRANSMITTER, ROSUVASTATIN, VASCEPA, VIT D, FENOFIBRATE   Doses left of specialty medications 1 WEEK   Is there any medication that is due not being filled? No   Supplies needed? No supplies needed   Cooler needed? No   Do any medications need mixed or dated? No   Copay form of payment Credit card on file   Additional comments 18.63   Questions or concerns for the pharmacist? No   Explain any questions or concerns for the pharmacist N/A   Are any medications first time fills? No            Medication Adherence    Adherence tools used: patient uses a pill box to manage medications  Support network for adherence: family member, healthcare provider          Follow-up: 25 day(s)     Bhavani Cunningham  Specialty Pharmacy Technician

## 2023-02-28 ENCOUNTER — SPECIALTY PHARMACY (OUTPATIENT)
Dept: ENDOCRINOLOGY | Age: 43
End: 2023-02-28
Payer: COMMERCIAL

## 2023-02-28 RX ORDER — DAPAGLIFLOZIN AND METFORMIN HYDROCHLORIDE 5; 1000 MG/1; MG/1
1 TABLET, FILM COATED, EXTENDED RELEASE ORAL 2 TIMES DAILY
Qty: 180 TABLET | Refills: 0 | Status: SHIPPED | OUTPATIENT
Start: 2023-02-28

## 2023-02-28 RX ORDER — INSULIN ASPART 100 [IU]/ML
INJECTION, SOLUTION INTRAVENOUS; SUBCUTANEOUS
Qty: 30 ML | Refills: 1 | Status: SHIPPED | OUTPATIENT
Start: 2023-02-28

## 2023-02-28 NOTE — PROGRESS NOTES
Specialty Pharmacy Patient Management Program  One-Time Clinical Outreach     Cora Gutiérrez is a 43 y.o. female with Type 2 Diabetes seen by an Endocrinology provider and enrolled in the Endocrinology Patient Management program offered by [facility].      A one-time clinical outreach was conducted regarding her Xigduo XR 5-1000 mg dose.  It appears the dose was temporarily  reduced to 1 tablet daily, but patient continued taking twice daily as originally prescribed.  Prescription fixed to reflect correct dosing of 1 tablet BID.  Patient voiced understanding.    Xigduo coupon will not pay for copay until next month.  A one-time free voucher was applied to cover quantity #30 (15-day supply) and patient is aware.  Insurance and copay card should adjudicate properly when next refill is due.    Laura Gupta, PharmD, BCCP, BCPS  Clinical Specialty Pharmacist, Endocrinology  2/28/2023  12:56 EST           Specialty Pharmacy Patient Management Program  Refill Outreach      Cora is a 43 y.o. female contacted today regarding refills of her medication(s).    Specialty medication(s) and dose(s) confirmed: Xigduo XR 5-1000 mg  Other medications being refilled: Novolog vials  Changes to medications: no  Changes to insurance: no  Reviewed and verified with patient:  Allergies  Meds  Problems         Refill Questions    Flowsheet Row Most Recent Value   Changes to allergies? No   Changes to medications? No   New conditions since last clinic visit No   Unplanned office visit, urgent care, ED, or hospital admission in the last 4 weeks  No   How does patient/caregiver feel medication is working? Very good   Financial problems or insurance changes  No   If yes, describe changes in insurance or financial issues. N/A   Since the previous refill, were any specialty medication doses or scheduled injections missed or delayed?  No   If yes, please provide the amount N/A   Why were doses missed? N/A   Does  this patient require a clinical escalation to a pharmacist? No          Delivery Questions    Flowsheet Row Most Recent Value   Delivery method Other (Comment)  [Beeline ship 3/1 for delivery 3/2]   Delivery address correct? Yes   Delivery phone number [phone]   Preferred delivery time? Anytime   Number of medications in delivery 2   Medication being filled and delivered Xigduo XR and Novolog vials   Doses left of specialty medications 3 - Xigduo   Is there any medication that is due not being filled? No   Supplies needed? No supplies needed   Cooler needed? Yes   Do any medications need mixed or dated? No   Copay form of payment Credit card on file   Additional comments $25   Questions or concerns for the pharmacist? No   Explain any questions or concerns for the pharmacist N/A   Are any medications first time fills? No        *Patient also requested Novolog vials for her Omnipod pods (previously used pens).  Novolog vials included in this order as documented above.     Follow-up: 2 week(s)     Laura Gupta, PharmD  2/28/2023   12:56 EST

## 2023-03-16 ENCOUNTER — SPECIALTY PHARMACY (OUTPATIENT)
Dept: ENDOCRINOLOGY | Age: 43
End: 2023-03-16
Payer: COMMERCIAL

## 2023-03-18 NOTE — PROGRESS NOTES
" Specialty Pharmacy Patient Management Program  Refill Outreach      Cora \"Cora Gutiérrez\" is a 43 y.o. female contacted today regarding refills of her medication(s).    Specialty medication(s) and dose(s) confirmed: Xigduo XR, Vascepa, Dexcom sensors  Other medications being refilled: rosuvastatin, fenofibrate and vitamin D  Changes to medications: no  Changes to insurance: no  Reviewed and verified with patient:  Allergies  Meds  Problems         Refill Questions    Flowsheet Row Most Recent Value   Changes to allergies? No   Changes to medications? No   New conditions since last clinic visit No   Unplanned office visit, urgent care, ED, or hospital admission in the last 4 weeks  No   How does patient/caregiver feel medication is working? Very good   Financial problems or insurance changes  No   If yes, describe changes in insurance or financial issues. N/A   Since the previous refill, were any specialty medication doses or scheduled injections missed or delayed?  No   If yes, please provide the amount N/A   Why were doses missed? N/A   Does this patient require a clinical escalation to a pharmacist? No          Delivery Questions    Flowsheet Row Most Recent Value   Delivery method Other (Comment)  [Beeline ship 3/17 for delivery 3/20]   Delivery address correct? Yes   Delivery phone number [phone]   Preferred delivery time? Anytime   Number of medications in delivery 6   Medication being filled and delivered Xigduo XR, Vascepa, Dexcom sensors, rosuvastatin, fenofibrate and vitamin D   Doses left of specialty medications about a week   Is there any medication that is due not being filled? No   Supplies needed? No supplies needed   Cooler needed? No   Do any medications need mixed or dated? No   Copay form of payment Credit card on file   Additional comments $33.09   Questions or concerns for the pharmacist? No   Explain any questions or concerns for the pharmacist N/A   Are any medications first time " fills? No               Follow-up: 3 week(s)     Laura Gupta, PharmD  3/18/2023   10:11 EDT

## 2023-03-27 ENCOUNTER — SPECIALTY PHARMACY (OUTPATIENT)
Dept: ENDOCRINOLOGY | Age: 43
End: 2023-03-27
Payer: COMMERCIAL

## 2023-03-27 NOTE — PROGRESS NOTES
" Specialty Pharmacy Patient Management Program  Endocrinology Refill Outreach      Cora \"Cora Gutiérrez\" is a 43 y.o. female contacted today regarding refills of her medication(s).    Specialty medication(s) and dose(s) confirmed: N/A  Other medication(s) being refilled: Novolog    Refill Questions    Flowsheet Row Most Recent Value   Changes to allergies? No   Changes to medications? No   New conditions since last clinic visit No   Unplanned office visit, urgent care, ED, or hospital admission in the last 4 weeks  No   How does patient/caregiver feel medication is working? Very good   Financial problems or insurance changes  No   Since the previous refill, were any specialty medication doses or scheduled injections missed or delayed?  No   Does this patient require a clinical escalation to a pharmacist? No          Delivery Questions    Flowsheet Row Most Recent Value   Delivery method Other (Comment)  [Beeline]   Delivery address correct? Yes   Delivery phone number [phone]   Preferred delivery time? Anytime   Number of medications in delivery 1   Medication being filled and delivered Novolog   Doses left of specialty medications N/A   Is there any medication that is due not being filled? No   Supplies needed? No supplies needed   Cooler needed? Yes   Do any medications need mixed or dated? No   Copay form of payment Credit card on file   Additional comments $25   Questions or concerns for the pharmacist? No   Are any medications first time fills? No          Medication Adherence    Adherence tools used: patient uses a pill box to manage medications  Support network for adherence: family member, healthcare provider           Follow-Up: 4/21 for future refills    Donna Syed PharmD, MM   Clinical Speciality Pharmacist, Endocrinology   3/27/2023  14:35 EDT    "

## 2023-04-06 RX ORDER — INSULIN PMP CART,AUT,G6/7,CNTR
EACH SUBCUTANEOUS
Qty: 45 EACH | Refills: 1 | Status: SHIPPED | OUTPATIENT
Start: 2023-04-06

## 2023-04-11 ENCOUNTER — SPECIALTY PHARMACY (OUTPATIENT)
Dept: ENDOCRINOLOGY | Age: 43
End: 2023-04-11
Payer: COMMERCIAL

## 2023-04-11 NOTE — PROGRESS NOTES
" Specialty Pharmacy Patient Management Program  Endocrinology Refill Outreach      Cora \"Cora Gutiérrez\" is a 43 y.o. female contacted today regarding refills of her medication(s).    Specialty medication(s) and dose(s) confirmed: Vascepa 1 g (120caps/30 days), Xigduo XR 5-1000 mg (60tabs/30days), Dexcom Sensor (3/30days)  Other medication(s) being refilled: fenofibrate, rosuvastatin, vit D    Refill Questions    Flowsheet Row Most Recent Value   Changes to allergies? No   Changes to medications? No   New conditions since last clinic visit No   Unplanned office visit, urgent care, ED, or hospital admission in the last 4 weeks  No   How does patient/caregiver feel medication is working? Very good   Financial problems or insurance changes  No   Since the previous refill, were any specialty medication doses or scheduled injections missed or delayed?  No   Does this patient require a clinical escalation to a pharmacist? No          Delivery Questions    Flowsheet Row Most Recent Value   Delivery method Other (Comment)  [Beeline]   Delivery address correct? Yes   Delivery phone number [phone]   Preferred delivery time? Anytime   Number of medications in delivery 5   Medication being filled and delivered Dexcom sensors, rosuvastatin, fenofibrate, Vascepa, Xigduo XR, Vit D   Doses left of specialty medications About a week   Is there any medication that is due not being filled? No   Supplies needed? No supplies needed   Cooler needed? No   Do any medications need mixed or dated? No   Copay form of payment Credit card on file   Additional comments $30.68   Questions or concerns for the pharmacist? No   Are any medications first time fills? No          Medication Adherence    Adherence tools used: patient uses a pill box to manage medications  Support network for adherence: family member, healthcare provider           Follow-Up: 5/3 for future refills    Donna Syed PharmD, MM   Clinical Speciality Pharmacist, " Endocrinology   4/11/2023  09:35 EDT

## 2023-04-17 ENCOUNTER — OFFICE VISIT (OUTPATIENT)
Dept: ENDOCRINOLOGY | Age: 43
End: 2023-04-17
Payer: COMMERCIAL

## 2023-04-17 VITALS
WEIGHT: 238.4 LBS | OXYGEN SATURATION: 97 % | BODY MASS INDEX: 42.24 KG/M2 | SYSTOLIC BLOOD PRESSURE: 126 MMHG | HEIGHT: 63 IN | DIASTOLIC BLOOD PRESSURE: 70 MMHG | TEMPERATURE: 97.3 F | HEART RATE: 83 BPM

## 2023-04-17 DIAGNOSIS — E78.2 HYPERLIPEMIA, MIXED: ICD-10-CM

## 2023-04-17 DIAGNOSIS — E11.65 TYPE 2 DIABETES MELLITUS WITH HYPERGLYCEMIA, WITH LONG-TERM CURRENT USE OF INSULIN: Primary | ICD-10-CM

## 2023-04-17 DIAGNOSIS — Z79.4 TYPE 2 DIABETES MELLITUS WITH HYPERGLYCEMIA, WITH LONG-TERM CURRENT USE OF INSULIN: Primary | ICD-10-CM

## 2023-04-17 DIAGNOSIS — E03.9 ACQUIRED HYPOTHYROIDISM: ICD-10-CM

## 2023-04-17 PROCEDURE — 95251 CONT GLUC MNTR ANALYSIS I&R: CPT | Performed by: NURSE PRACTITIONER

## 2023-04-17 PROCEDURE — 99214 OFFICE O/P EST MOD 30 MIN: CPT | Performed by: NURSE PRACTITIONER

## 2023-04-17 RX ORDER — GLUCAGON 3 MG/1
3 POWDER NASAL AS NEEDED
Qty: 2 EACH | Refills: 1 | Status: SHIPPED | OUTPATIENT
Start: 2023-04-17

## 2023-04-17 NOTE — PROGRESS NOTES
"Chief Complaint  Diabetes (Type 2: Pt Dexcom and omnipod are attached, is not up to date on eye exam, no hx of retinopathy or neuropathy. )    Subjective        Coragalo Gutiérrez presents to [facility] ENDOCRINOLOGY  History of Present Illness     Got a new PDM and just entered random settings    Current basal rate for manual mode- 5u/hr, changed today to 2u/hr for when in manual mode  Active insulin time 6 hours, change to 3 hours   Correction at 5, changed back to 20   Target glucose/ correct above at 150/160, changed to 120/120      Type 2 dm diagnosed April 2015, insulin started at that time     Today in clinic pt reports being on omnipod 5 with novolog, xigduo 5-1000 BID  Used victoza in the past after last hospital stay, h/o pancreatitis most likely related to uncontrolled hypertriglyceridemia about a year ago    Very frustrated with weight gain, exercise and diet not helping per patient reports   Backup plan: insulin pens  Glucagon: none on hand  Last eye exam -  n/a  Denies diabetic complications   Episodes of hypoglycemia - no complicated events since omnipod 5 start, worse if she skips meals or does not follow her routine    On statin and vascepa     Hypothyroid  Synthroid 175mcg daily  Lab Results   Component Value Date    TSH 2.060 11/30/2022         Objective   Vital Signs:  /70   Pulse 83   Temp 97.3 °F (36.3 °C) (Temporal)   Ht 160 cm (62.99\")   Wt 108 kg (238 lb 6.4 oz)   SpO2 97%   BMI 42.24 kg/m²   Estimated body mass index is 42.24 kg/m² as calculated from the following:    Height as of this encounter: 160 cm (62.99\").    Weight as of this encounter: 108 kg (238 lb 6.4 oz).             Physical Exam  Vitals reviewed.   Constitutional:       General: She is not in acute distress.  HENT:      Head: Normocephalic and atraumatic.   Cardiovascular:      Rate and Rhythm: Normal rate.   Pulmonary:      Effort: Pulmonary effort is normal. No respiratory distress. "   Musculoskeletal:         General: No signs of injury. Normal range of motion.      Cervical back: Normal range of motion and neck supple.   Skin:     General: Skin is warm and dry.   Neurological:      Mental Status: She is alert and oriented to person, place, and time. Mental status is at baseline.   Psychiatric:         Mood and Affect: Mood normal.         Behavior: Behavior normal.         Thought Content: Thought content normal.         Judgment: Judgment normal.        Result Review :  The following data was reviewed by: PORSHA Shaw on 04/17/2023:  Common labs        4/25/2022    09:18 11/30/2022    08:53 4/4/2023    09:46   Common Labs   Glucose 126   120      BUN 15   13      Creatinine 0.82   0.92      Sodium 139   140      Potassium 4.3   4.5      Chloride 102   103      Calcium 8.9   9.3      Total Cholesterol 168   161      Triglycerides 340   265      HDL Cholesterol 38   35      LDL Cholesterol  75   82      Hemoglobin A1C 8.6   7.90   8.30                    Assessment and Plan   Diagnoses and all orders for this visit:    1. Type 2 diabetes mellitus with hyperglycemia, with long-term current use of insulin (Primary)  -     TSH; Future  -     T4, Free; Future  -     T3, Free; Future  -     Hemoglobin A1c; Future  -     Comprehensive Metabolic Panel; Future  -     Lipid Panel; Future  -     Vitamin D,25-Hydroxy; Future  -     Microalbumin / Creatinine Urine Ratio - Urine, Clean Catch; Future    2. Hyperlipemia, mixed  -     TSH; Future  -     T4, Free; Future  -     T3, Free; Future  -     Hemoglobin A1c; Future  -     Comprehensive Metabolic Panel; Future  -     Lipid Panel; Future  -     Vitamin D,25-Hydroxy; Future  -     Microalbumin / Creatinine Urine Ratio - Urine, Clean Catch; Future    3. Acquired hypothyroidism  -     TSH; Future  -     T4, Free; Future  -     T3, Free; Future  -     Hemoglobin A1c; Future  -     Comprehensive Metabolic Panel; Future  -     Lipid Panel; Future  -     " Vitamin D,25-Hydroxy; Future  -     Microalbumin / Creatinine Urine Ratio - Urine, Clean Catch; Future    Other orders  -     Glucagon (Baqsimi Two Pack) 3 MG/DOSE powder; 3 mg into the nostril(s) as directed by provider As Needed (hypoglycemia on pump).  Dispense: 1 each; Refill: 1             Follow Up   Return in about 3 months (around 7/17/2023).     Cgm reviewed and settings adjusted  a1c worse, most likely from random settings she entered into her pdm, settings adjusted and reeducated patient on pump use safety and need for accurate    Educated on hypoglycemia prevention, baqsimi sent in   Educated on need for yearly/ routine eye exams   Wanting to retry glp-1- has victoza at home, educated on increased risk of pancreatitis, will check expiration at home on dose of victoza and notify me before restarting, verbalizes increased risk of pancreatitis and when to seek er care     From patient one year ago: \" I just wanted to let you know the events that occurred on Nov 1st through today, November 7th. On Nov 1st I went to the ER due to severe pain in my back, abdomen and chest and thought I was having a heart attack. After figuring out that I wasn't I left the ER, but then began vomiting violently. I went to my PCP who then sent me back to the ER where I was admitted and admitted relatively immediately. After several panels, it was determined that my Triglycerides were literally off of their charts ( which happens at about 5,680. 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 Please see specific information pulled into the AVS if appropriate.     Thais Wheatley APRN

## 2023-04-23 RX ORDER — PROCHLORPERAZINE 25 MG/1
SUPPOSITORY RECTAL
Qty: 9 EACH | Refills: 1 | Status: SHIPPED | OUTPATIENT
Start: 2023-04-23 | End: 2023-07-22

## 2023-04-23 RX ORDER — INSULIN ASPART 100 [IU]/ML
INJECTION, SOLUTION INTRAVENOUS; SUBCUTANEOUS
Qty: 30 ML | Refills: 1 | Status: SHIPPED | OUTPATIENT
Start: 2023-04-23

## 2023-04-25 ENCOUNTER — SPECIALTY PHARMACY (OUTPATIENT)
Dept: ENDOCRINOLOGY | Age: 43
End: 2023-04-25
Payer: COMMERCIAL

## 2023-04-25 NOTE — PROGRESS NOTES
"Specialty Pharmacy Refill Coordination Note     Cora \"Cora Gutiérrez\" is a 43 y.o. female contacted today regarding refills of   specialty medication(s).    Reviewed and verified with patient:       Specialty medication(s) and dose(s) confirmed: yes    Refill Questions    Flowsheet Row Most Recent Value   Changes to allergies? No   Changes to medications? No   New conditions since last clinic visit No   How does patient/caregiver feel medication is working? Very good   Financial problems or insurance changes  No   If yes, describe changes in insurance or financial issues. NA   Since the previous refill, were any specialty medication doses or scheduled injections missed or delayed?  No   If yes, please provide the amount NA   Why were doses missed? NA   Does this patient require a clinical escalation to a pharmacist? No          Delivery Questions    Flowsheet Row Most Recent Value   Delivery method Other (Comment)  [Beeline]   Delivery address correct? Yes   Delivery phone number [phone]   Preferred delivery time? Anytime   Number of medications in delivery 1   Medication being filled and delivered Novolog   Doses left of specialty medications 1 week   Is there any medication that is due not being filled? No   Cooler needed? Yes   Do any medications need mixed or dated? No   Copay form of payment Credit card on file   Additional comments $25   Questions or concerns for the pharmacist? No   Explain any questions or concerns for the pharmacist NA   Are any medications first time fills? No   Shipment status Cooler packed            Medication Adherence    Adherence tools used: patient uses a pill box to manage medications  Support network for adherence: family member, healthcare provider          Follow-up: 25 day(s)     Thais Whitten  Specialty Pharmacy Technician      "

## 2023-05-03 RX ORDER — FENOFIBRATE 145 MG/1
145 TABLET, COATED ORAL DAILY
Qty: 90 TABLET | Refills: 0 | Status: SHIPPED | OUTPATIENT
Start: 2023-05-03

## 2023-05-03 RX ORDER — DAPAGLIFLOZIN AND METFORMIN HYDROCHLORIDE 5; 1000 MG/1; MG/1
1 TABLET, FILM COATED, EXTENDED RELEASE ORAL DAILY
Qty: 180 TABLET | Refills: 0 | Status: SHIPPED | OUTPATIENT
Start: 2023-05-03

## 2023-05-04 ENCOUNTER — SPECIALTY PHARMACY (OUTPATIENT)
Dept: ENDOCRINOLOGY | Age: 43
End: 2023-05-04
Payer: COMMERCIAL

## 2023-05-04 NOTE — PROGRESS NOTES
"Specialty Pharmacy Refill Coordination Note     Cora \"Cora Gutiérrez\" is a 43 y.o. female contacted today regarding refills of  3 specialty medication(s).    Reviewed and verified with patient:       Specialty medication(s) and dose(s) confirmed: yes    Refill Questions    Flowsheet Row Most Recent Value   Changes to allergies? No   Changes to medications? No   New conditions since last clinic visit No   Unplanned office visit, urgent care, ED, or hospital admission in the last 4 weeks  No   How does patient/caregiver feel medication is working? Very good   Financial problems or insurance changes  No   If yes, describe changes in insurance or financial issues. NA   Since the previous refill, were any specialty medication doses or scheduled injections missed or delayed?  No   If yes, please provide the amount NA   Why were doses missed? NA   Does this patient require a clinical escalation to a pharmacist? No          Delivery Questions    Flowsheet Row Most Recent Value   Delivery address correct? No   Delivery phone number [phone]   Preferred delivery time? Anytime   Number of medications in delivery 2   Medication being filled and delivered Dexcom transmitter and baqsimi   Doses left of specialty medications 1 week/ new rx   Is there any medication that is due not being filled? No   Supplies needed? No supplies needed   Cooler needed? No   Do any medications need mixed or dated? No   Copay form of payment Credit card on file   Additional comments $0   Questions or concerns for the pharmacist? No   Explain any questions or concerns for the pharmacist NA   Are any medications first time fills? No            Medication Adherence    Adherence tools used: patient uses a pill box to manage medications  Support network for adherence: family member, healthcare provider          Follow-up: 25 day(s)     Thais Whitten  Specialty Pharmacy Technician      "

## 2023-05-15 RX ORDER — DAPAGLIFLOZIN AND METFORMIN HYDROCHLORIDE 5; 1000 MG/1; MG/1
1 TABLET, FILM COATED, EXTENDED RELEASE ORAL 2 TIMES DAILY
Qty: 180 TABLET | Refills: 0 | Status: SHIPPED | OUTPATIENT
Start: 2023-05-15 | End: 2023-08-13

## 2023-05-17 ENCOUNTER — SPECIALTY PHARMACY (OUTPATIENT)
Dept: ENDOCRINOLOGY | Age: 43
End: 2023-05-17
Payer: COMMERCIAL

## 2023-05-17 NOTE — PROGRESS NOTES
"Specialty Pharmacy Refill Coordination Note     Cora \"Cora Gutiérrez\" is a 43 y.o. female contacted today regarding refills of  5 specialty medication(s).    Reviewed and verified with patient:       Specialty medication(s) and dose(s) confirmed: yes    Refill Questions    Flowsheet Row Most Recent Value   Changes to allergies? No   Changes to medications? No   New conditions since last clinic visit No   Unplanned office visit, urgent care, ED, or hospital admission in the last 4 weeks  No   How does patient/caregiver feel medication is working? Very good   Financial problems or insurance changes  No   If yes, describe changes in insurance or financial issues. NA   Since the previous refill, were any specialty medication doses or scheduled injections missed or delayed?  No   If yes, please provide the amount NA   Why were doses missed? NA   Does this patient require a clinical escalation to a pharmacist? No          Delivery Questions    Flowsheet Row Most Recent Value   Delivery method Other (Comment)  [Beeline]   Delivery address correct? Yes   Delivery phone number [phone]   Preferred delivery time? Anytime   Number of medications in delivery 5   Medication being filled and delivered Fenofibrate, rosuvastatin, Vascepa, vitamin D, Xigduo   Doses left of specialty medications 1 week   Is there any medication that is due not being filled? No   Supplies needed? No supplies needed   Cooler needed? No   Do any medications need mixed or dated? No   Copay form of payment Credit card on file   Additional comments $96.03   Questions or concerns for the pharmacist? No   Explain any questions or concerns for the pharmacist NA   Are any medications first time fills? No   Tracking number for delivery NA            Medication Adherence    Adherence tools used: patient uses a pill box to manage medications  Support network for adherence: family member, healthcare provider          Follow-up: 25 day(s)     Thais" Fish  Specialty Pharmacy Technician

## 2023-05-19 RX ORDER — DAPAGLIFLOZIN AND METFORMIN HYDROCHLORIDE 5; 1000 MG/1; MG/1
1 TABLET, FILM COATED, EXTENDED RELEASE ORAL 2 TIMES DAILY
Qty: 30 TABLET | Refills: 1 | OUTPATIENT
Start: 2023-05-19

## 2023-05-23 ENCOUNTER — E-VISIT (OUTPATIENT)
Dept: FAMILY MEDICINE CLINIC | Facility: TELEHEALTH | Age: 43
End: 2023-05-23
Payer: COMMERCIAL

## 2023-05-23 PROCEDURE — BRIGHTMDVISIT: Performed by: NURSE PRACTITIONER

## 2023-05-23 NOTE — E-VISIT TREATED
Chief Complaint: Cold, flu, COVID, sinus, hay fever, or seasonal allergies   Patient introduction   Patient is 43-year-old female with cough, shortness of breath, congestion, nasal discharge, itchy nose or sneezing, sore throat, headache, and fatigue that started 3 to 5 days ago. Regarding date of symptom onset, patient writes: 5/18/2023.   COVID-19 testing history, vaccination status, and exposure:    Patient did a self-test within the last week. Patient specifies date of test as 5/19/23. Test result was negative.    Patient was prompted to take a self-test during the interview, but does not have a COVID-19 test kit.    Received 2 doses of the COVID-19 vaccine (Pfizer, Pfizer). Received their most recent dose of the vaccine more than 14 days ago.    No known exposure to a person with a confirmed or suspected case of COVID-19.    No travel outside local community within the last 14 days.   Risk factors for severe disease from COVID-19 infection    BMI >= 40.    Smokes tobacco occasionally.    Diabetes.    An unspecified autoimmune disorder.   General presentation   Symptoms came on gradually.   Fever:    No fever.   Sinus and nasal symptoms:    Nasal discharge.    Itchy nose or sneezing.    Green nasal drainage.    Nasal drainage is thin.    Postnasal drip.    Sinus pain or pressure on or around the forehead, eyes, nose, and upper teeth or jaw.    Patient first noticed sinus pain less than 5 days ago.    Sinus pain is not worse with Valsalva.    No history of unhealed nasal septal ulcer/nasal wound.    No history of antibiotic treatment for sinus infection in the last year.    No history of deviated septum or nasal polyps.   Throat symptoms:    Sore throat.    Patient can swallow liquids and solid foods with ease.   Head and body aches:    Headache described as mild (1 to 3 on a scale of 1 to 10).    Fatigue.    No sweats.    No chills.    No myalgia.   Cough:    Cough is not noticeably worse depending on time of  day    Cough is mildly productive of sputum.    Uncertain of color of sputum.   Wheezing and shortness of breath:    Wheezing.    Mild shortness of breath that does not affect ADLs. Despite shortness of breath, patient can speak normally. Despite shortness of breath, patient can take a full, deep breath (inhaling for 3 to 4 seconds).    Has previously used a steroid inhaler for URI, bronchitis, or pneumonia.    No COPD diagnosis.    No asthma diagnosis.    No previous albuterol inhaler use for URIs, bronchitis, or pneumonia.   Chest pain:    No chest pain.   Ear symptoms:    Current symptoms include a plugged or blocked sensation in the ear(s).   Dizziness:    Mild dizziness that does not interfere with daily activities.   Allergies:    Patient has known seasonal allergies.   Flu exposure:    Recent household, close-proximity, near-proximity, and distant-proximity exposure to a person with a confirmed flu diagnosis.    Has not had a flu vaccine this season.   Not taking any over-the-counter medications for current symptoms.   Review of red flags/alarm symptoms:    No severe shortness of breath.    No inability to repeat a sentence without stopping for breath.    No inability to take a full breath lasting 3 to 4 seconds.    No changes in alertness or awareness.    No nuchal rigidity.    No symptoms suggesting airway obstruction.    No symptoms suggesting intracranial hemorrhage.    No decreased urination.   Risk factors for antibiotic resistance:    Diabetes.    Smokes tobacco occasionally.   Pregnancy/menstrual status/breastfeeding:    Not pregnant.    Not breastfeeding.    Regarding date of last menstrual period, patient writes: 20 days ago.   Self-exam:    Height: 160 centimeters    Weight: 102.9 kilograms    No difficulty moving their chin toward their chest.    No tonsillar edema.    Tonsils appear normal.    No palatal petechiae.    Neck lymph nodes feel normal.   Recent antibiotic use:    Has not taken  antibiotics for similar symptoms within the past month.   Current medications   Currently taking Vascepa 1 g capsule capsule, famotidine 40 MG tablet, BD Pen Needle Gina U/F 32G X 4 MM misc, vitamin D 1.25 MG (02421 UT) capsule capsule, fenofibrate 145 MG tablet, lidocaine 5 %, ondansetron ODT 8 MG disintegrating tablet, escitalopram 10 MG tablet, rosuvastatin 40 MG tablet, Xigduo XR 5-1000 MG tablet, norgestimate-ethinyl estradiol 0.18/0.215/0.25 MG-35 MCG per tablet, NovoLOG 100 UNIT/ML injection, Xigduo XR 5-1000 MG tablet, Omnipod 5 G6 Pod (Gen 5) misc, Dexcom G6 Sensor, Synthroid 175 MCG tablet, Baqsimi Two Pack 3 MG/DOSE powder, Dexcom G6 Transmitter misc, and Accu-Chek SmartView test strip.   Medication allergies   No relevant drug allergies.   Medication contraindication review   Patient has history of depression and diabetes. Therefore, the following medication(s) will not be prescribed:    Metoclopramide.    Pseudoephedrine.   No history of metoclopramide-associated dystonic reaction and tardive dyskinesia.   No known history of amoxicillin-clavulanate-associated cholestatic jaundice or hepatic impairment.   No known history of azithromycin-associated cholestatic jaundice or hepatic impairment.   Past medical history   Immune conditions: Regarding an autoimmune disorder they have, patient writes: Diabetes. Patient takes medications regularly for their condition(s).   No history of cancer.   Social history   Smokes tobacco occasionally.   High-risk household contacts:    Household contact with diabetes.   Patient requests a 1-day excuse note.   Assessment   Bacterial sinusitis.   This is the likely diagnosis based on patient's interview responses, including:    Symptom profile    Short duration of symptoms indicating severe symptoms at onset    Sinus pressure for less than 5 days   Plan   Medications:    amoxicillin 875 mg-potassium clavulanate 125 mg tablet RX 875mg/125mg 1 tab PO bid 7d for infection.  This medication is an antibiotic. Take it exactly as directed. You must finish the entire course of medication, even if you feel better after the first few days of treatment. Amount is 14 tab.    [facility] Plus Cold (PE) 2 mg-7.8 mg-325 mg effervescent tablet OTC Aspirin 325mg/chlorpheniramine 2mg/phenylephrine bitartrate 7.8mg 2 tabs PO q4h PRN 7d for cold symptoms. Do not exceed 8 tablets in a 24-hour period. May cause drowsiness. Do not drive or operate heavy machinery after taking this medication. Do not combine with other products that contain NSAIDs (aspirin, ibuprofen, naproxen, or others). Amount is 20 tab.   The patient's prescription will be sent to:   [id number]   [address] [place] [address]   Phone: [phone]     Fax: [phone]   Patient informed to purchase OTC medication.   Other:   Patient was given an excuse note for 1 day.   Education:    Condition and causes    Prevention    Treatment and self-care    When to call provider   ----------   Electronically signed by PORSHA Morales on 2023-05-23 at 10:18AM   ----------   Patient Interview Transcript:   Which of these symptoms are bothering you? Select all that apply.    Cough    Shortness of breath    Stuffed-up nose or sinuses    Runny nose    Itchy nose or sneezing    Sore throat    Headache    Fatigue or tiredness   Not selected:    Itchy or watery eyes    Loss of smell or taste    Hoarse voice or loss of voice    Fever    Sweats    Chills    Muscle or body aches    Nausea or vomiting    Diarrhea    I don't have any of these symptoms   When did your current symptoms start? Select one.    3 to 5 days ago   Not selected:    Less than 48 hours ago    6 to 9 days ago    10 to 14 days ago    2 to 3 weeks ago    3 to 4 weeks ago    More than a month ago   Do you know the exact date your symptoms started? If so, enter the date as MM/DD/YY. Select one.    Yes (specify): 5/18/2023   Not selected:    No   Did your  symptoms come on suddenly or gradually? Select one.    Gradually   Not selected:    Suddenly    I'm not sure   Since your current symptoms started, have you had a viral test for COVID-19? - This includes home self-tests as well as nose swab or saliva tests done at a doctor's office, lab, or testing site. - It does NOT include antibody tests, which use a blood sample to test for past infection. Select one.    Yes   Not selected:    No   When was your most recent COVID-19 test? Select one.    Within the last week (specify date as MM/DD/YY): 5/19/23   Not selected:    Within the last 24 hours    7 to 14 days ago    15 to 30 days ago    More than 1 month ago   What type of COVID-19 test did you most recently have? Select one.    Viral self-test or home test   Not selected:    Viral test at a doctor's office, lab, or testing site   What was the result of your most recent COVID-19 test? Select one.    Negative   Not selected:    Positive   Even though your last test was negative, you could still have COVID. You may have tested before the virus was detectable. In some cases, repeat testing over several days is needed. If you have a COVID test kit, take the test now before continuing with this interview. Do you have a COVID test kit? Select one.    No, I don't have a test kit   Not selected:    Yes, and I'll take another test now    Yes, but I prefer not to take a test now   Have you gotten the COVID-19 vaccine? Select one.    Yes   Not selected:    No   How many total doses of the COVID-19 vaccine have you gotten? This includes boosters as well as additional doses for those who are immunocompromised. Select one.    2 doses   Not selected:    1 dose    3 doses    4 doses    5 doses   1st dose    Pfizer   Not selected:    J&J/Alicia    Moderna    Novavax   2nd dose    Pfizer   Not selected:    J&J/Alicia    Moderna    Novavax   When did you get your most recent dose of the COVID-19 vaccine?    More than 14 days ago   Not  "selected:    Less than 48 hours (2 days) ago    48 to 72 hours (3 days) ago    3 to 5 days ago    5 to 7 days ago    7 to 14 days ago   In the last 14 days, have you traveled outside of your local community? This includes travel by car, RV, bus, train, or plane. Travel increases your chances of getting and spreading COVID-19. Select one.    No   Not selected:    Yes   In the last 14 days, have you had close contact with someone who has coronavirus (COVID-19)? \"Close contact\" means any of these: - Living in the same household as someone with COVID-19. - Caring for someone with COVID-19. - Being within 6 feet of someone with COVID-19 for a total of at least 15 minutes over a 24-hour period. For example, three 5-minute exposures for a total of 15 minutes. - Being in direct contact with respiratory droplets from someone with COVID-19 (being coughed on, kissing, sharing utensils). Select one.    No, not that I know of   Not selected:    Yes, a confirmed case    Yes, a suspected case   How would you describe your shortness of breath? Sometimes shortness of breath can be a sign of a more serious condition. Select one.    Mild (about what I normally have with a cold)   Not selected:    Moderate (it's bad, but I can still do simple things like get dressed, bathe, or comb my hair)    Severe (it's so bad that I can't do simple things like get dressed, bathe, or comb my hair)   Do you have a home pulse oximeter? A pulse oximeter measures the oxygen level of your blood. It's usually a clip-like device that attaches to your finger or your ear. Most pulse oximeters can also measure your heart rate. Select one.    No   Not selected:    Yes   We'd like to find out more about your shortness of breath. Try to say the following sentence out loud: \"I went to the store today to buy bread, milk, and eggs.\" Are you able to get to the end of the sentence without stopping for breath? If not, you may need emergency care.    Yes   Not " selected:    No   Are you able to take a full, deep breath? A full, deep breath means inhaling for 3 to 4 seconds. If you can't do this, you may need to seek emergency care. Select one.    Yes   Not selected:    No   You mentioned having a headache. On a scale of 1 to 10, how severe is your headache pain? Select one.    Mild (1 to 3)   Not selected:    Moderate (4 to 6)    Severe (7 to 9)    Unbearable (10)    The worst headache of my life (10+)   Do you cough so hard that it's made you gag or vomit? By gag, we mean has your coughing made you choke or dry heave? Select all that apply.    Yes, my coughing has made me gag   Not selected:    Yes, my coughing has made me vomit    No   When is your cough the worst? Select all that apply.    I haven't noticed a difference depending on time of day   Not selected:    In the morning, or when I wake up    During the day    At nighttime, or while I'm sleeping   Are you coughing up mucus or phlegm? Select one.    Yes, a little   Not selected:    No, my cough is dry    Yes, a lot   What color is most of the mucus or phlegm that you're coughing up? Select one.    I'm not sure   Not selected:    Clear    White/frothy    Yellow or yellowish    Green or greenish    Red or pink   Do you feel sinus pain or pressure in any of these areas?    In my forehead    Around my eyes    Behind my nose    In my upper teeth or jaw   Not selected:    In my cheeks    No   When did you first notice your sinus pain or pressure? Select one.    Less than 5 days ago   Not selected:    5 to 9 days ago    10 to 14 days ago    2 to 4 weeks ago    1 month ago or longer   Does coughing, sneezing, or leaning forward make your sinuses feel worse? Select one.    No   Not selected:    Yes   What color is your nasal drainage? Select one.    Green or greenish   Not selected:    Clear    White    Yellow or yellowish    My nose is stuffed but not draining or running   Is your nasal drainage thick or thin? Select  "one.    Thin   Not selected:    Thick   Is there any drainage (mucus) going down the back of your throat? This kind of drainage is also called \"postnasal drip.\" Select one.    Yes   Not selected:    No, not that I know of   Can you swallow liquids and solid foods? A sore throat may be painful when swallowing, but it shouldn't prevent you from swallowing. Select one.    Yes, with ease   Not selected:    Yes, but it's uncomfortable    Yes, but it's painful    It's hard to swallow anything because it feels like liquids and food get stuck in my throat    No, I can't swallow anything, liquid or solid foods   Is your throat pain worse on one side than the other? Select one.    No, it's the same on both sides   Not selected:    Yes, it's worse on the right side    Yes, it's worse on the left side   Since your symptoms started, have you felt dizzy? Select one.    Yes, but I can continue with my regular daily activities   Not selected:    Yes, and it makes it hard to stand, walk, or do daily activities    No   Do you have chest pain? You might also feel it as discomfort, aching, tightness, or squeezing in the chest. Select one.    No   Not selected:    Yes   Have you urinated at least 3 times in the last 24 hours? Select one.    Yes   Not selected:    No   Changes in alertness or awareness may mean you need emergency care. Since your symptoms started, have you had any of these? Select all that apply.    None of the above   Not selected:    Confusion    Slurred speech    Not knowing where you are or what day it is    Difficulty staying conscious    Fainting or passing out   Do your symptoms include a whistling sound, or wheezing, when you breathe? Select one.    Yes   Not selected:    No    I'm not sure   Do you have any of these symptoms in your ear(s)? Select all that apply.    Plugged or blocked sensation   Not selected:    Pain    Pressure    Fullness    Crackling or popping    None of the above   Can you move your chin " toward your chest?    Yes   Not selected:    No, my neck is too stiff   Are your tonsils larger than usual?    No, not that I can tell   Not selected:    Yes    I've had my tonsils removed   Is there any white or yellow pus on your tonsils?    No, not that I can see   Not selected:    Yes   Are there red spots on the roof of your mouth or the back of your throat?    No, not that I can see   Not selected:    Yes   Are your glands/lymph nodes swollen, or does it hurt when you touch them?    No, not that I can tell   Not selected:    Yes   In the past week, has anyone around you (such as at school, work, or home) had a confirmed diagnosis of the flu? A confirmed diagnosis means that a nose swab was done to verify a flu infection. Select all that apply.    I live with someone who has the flu    I've been within touching distance of someone who has the flu    I've walked by, or sat about 3 feet away from, someone who has the flu    I've been in the same building as someone who has the flu   Not selected:    No, not that I know of   Have you ever been diagnosed with asthma? Select one.    No   Not selected:    Yes   Have you ever been prescribed albuterol to use for wheezing, cough, or shortness of breath caused by a cold, bronchitis, or pneumonia? Albuterol (ProAir, Proventil, Ventolin) is prescribed as an inhaler or a solution to be used with a nebulizer machine. Select one.    No, not that I know of   Not selected:    Yes   Have you ever been prescribed a steroid inhaler to use for wheezing, cough, or shortness of breath caused by a cold, bronchitis, or pneumonia? Some examples of steroid inhalers include Pulmicort, Flovent, Qvar, and Alvesco. Select one.    Yes   Not selected:    No, not that I know of   Have you ever been diagnosed with chronic obstructive pulmonary disease (COPD)? Select one.    No, not that I know of   Not selected:    Yes   In the past month, have you taken antibiotics for similar symptoms?  Examples of antibiotics include amoxicillin, amoxicillin-clavulanate (Augmentin), penicillin, cefdinir (Omnicef), doxycycline, and clindamycin (Cleocin). Select one.    No   Not selected:    Yes (specify)   In the last year, how many times were you treated with antibiotics for a sinus infection? Select one.    None   Not selected:    1 to 3 times    4 or more times   Have you been diagnosed with a deviated septum or nasal polyps? The nose is divided into two nostrils by the septum. A crooked septum is called a deviated septum. Nasal polyps are growths inside the nose or sinuses. Select one.    No, not that I know of   Not selected:    Yes, but I had surgery to treat them    Yes, I have a deviated septum    Yes, I have nasal polyps    Yes, I have a deviated septum and nasal polyps   Do you have a sore inside your nose that won't heal? Select one.    No, not that I know of   Not selected:    Yes   Do you have allergies (pollen, dust mites, mold, animal dander)? Select one.    Yes   Not selected:    No, not that I know of   What kind of allergies do you have? Select all that apply.    Seasonal allergies (hay fever)   Not selected:    Pet allergies    Dust allergies    None of the above    I'm not sure   Do you think your symptoms could be allergy-related? Select one.    I'm not sure   Not selected:    Yes    No   Have you had a flu shot this season? Select one.    No   Not selected:    Yes, less than 2 weeks ago    Yes, 2 to 4 weeks ago    Yes, 1 to 3 months ago    Yes, 3 to 6 months ago    Yes, more than 6 months ago   Are you pregnant? Select one.    No   Not selected:    Yes   When was your last menstrual period? If you don't currently have periods or no longer have periods, please briefly explain.    20 days ago   Within the last 2 weeks, have you: - Given birth - Had a miscarriage - Had a pregnancy loss - Had an  Being postpartum (live birth or loss) within the last 2 weeks increases your risk of flu  complications. Select one.    No   Not selected:    Yes   Are you breastfeeding? Select one.    No   Not selected:    Yes   The flu and COVID-19 can be more serious for people in certain groups. The next few questions help us figure out if you or anyone you live with is at higher risk for complications from these infections. Do any of these statements apply to you? Select all that apply.    None of the above   Not selected:    I'm     I'm     I'm Black    I'm  or    Do you smoke tobacco? Select one.    Yes, some days   Not selected:    Yes, every day    No, I quit    No   Do you have any of these conditions? Select all that apply.    None of the above   Not selected:    Chronic lung disease, such as cystic fibrosis or interstitial fibrosis    Heart disease, such as congenital heart disease, congestive heart failure, or coronary artery disease    Disorder of the brain, spinal cord, or nerves and muscles, such as dementia, cerebral palsy, epilepsy, muscular dystrophy, or developmental delay    Metabolic disorder or mitochondrial disease    Cerebrovascular disease, such as stroke or another condition affecting the blood vessels or blood supply to the brain    Down syndrome    Mood disorder, including depression or schizophrenia spectrum disorders    Substance use disorder, such as alcohol, opioid, or cocaine use disorder    Tuberculosis   Do you live in a group care setting? Examples include: - Nursing home - Residential care - Psychiatric treatment facility - Group home - Dormitory - Board and care home - Homeless shelter - Foster care setting Select one.    No   Not selected:    Yes   Are you a healthcare worker? Select one.    No   Not selected:    Yes   People with a very high body mass index (BMI) are at higher risk for developing complications from the flu and severe illness from COVID-19. To determine your BMI, we need to know your weight and height. Please enter your  weight (in pounds).    Weight   Please enter your height.    Height   Do you have any of these conditions that can affect the immune system? Scroll to see all options. Select all that apply.    An autoimmune disorder not listed here (specify): Diabetes   Not selected:    History of bone marrow transplant    Chronic kidney disease    Chronic liver disease (including cirrhosis)    HIV/AIDS    Inflammatory bowel disease (Crohn's disease or ulcerative colitis)    Lupus    Moderate to severe plaque psoriasis    Multiple sclerosis    Rheumatoid arthritis    Sickle cell anemia    Alpha or beta thalassemia    History of solid organ transplant (kidney, liver, or heart)    History of spleen removal    A condition requiring treatment with long-term use of oral steroids (such as prednisone, prednisolone, or dexamethasone) (specify)    None of these   Do you take medications for your condition? This includes oral and injectable medications that are taken daily, weekly, or monthly. Select one.    Yes, regularly   Not selected:    Yes, for flare-ups only    No   Have you ever been diagnosed with cancer? Select one.    No   Not selected:    Yes, I have cancer now    Yes, but I'm in remission   Do any of these apply to you? Select all that apply.    I have diabetes   Not selected:    I've been hospitalized within the last 5 days    I'm in close contact with a child in     None of the above   The flu and COVID-19 can be more serious for people in certain groups. Do any of these apply to the people who live with you? Select all that apply.    None of the above   Not selected:    Under age 5    Over age 65            Black     or     Pregnant    Has given birth, had a miscarriage, had a pregnancy loss, or had an  in the last 2 weeks   Does any member of your household have any of these medical conditions? Select all that apply.    Diabetes   Not selected:    Asthma    Disorders  of the brain, spinal cord, or nerves and muscles, such as dementia, cerebral palsy, epilepsy, muscular dystrophy, or developmental delay    Chronic lung disease, such as COPD or cystic fibrosis    Heart disease, such as congenital heart disease, congestive heart failure, or coronary artery disease    Cerebrovascular disease, such as stroke or another condition affecting the blood vessels or blood supply to the brain    Blood disorders, such as sickle cell disease    Metabolic disorders such as inherited metabolic disorders or mitochondrial disease    Kidney disorders    Liver disorders    Weakened immune system due to illness or medications such as chemotherapy or steroids    Children under the age of 19 who are on long-term aspirin therapy    Extreme obesity (BMI > 40)    None of the above   Do you have any of these conditions? Scroll to see all options. Select all that apply.    Depression   Not selected:    Aspirin triad (also known as Samter's triad or ASA triad)    Asthma or hives from taking aspirin or other NSAIDs, such as ibuprofen or naproxen    Blockage or narrowing of the blood vessels of the heart    Blood clotting disorder    Blood dyscrasia, such anemia, leukemia, lymphoma, or myeloma    Bone marrow depression    Catecholamine-releasing paraganglioma    Congenital long QT syndrome    Difficulty urinating or completely emptying your bladder    Uncorrected electrolyte abnormalities    Fungal infection    Gastrointestinal (GI) bleeding    Gastrointestinal (GI) obstruction    G6PD deficiency    Recent heart attack    High blood pressure    Irregular heartbeat or heart rhythm    Mononucleosis (mono)    Myasthenia gravis    Parkinson's disease    Pheochromocytoma    Reye syndrome    Seizure disorder    Thyroid disease    Ulcerative colitis    None of the above   Have you ever had either of these conditions? Select all that apply.    No   Not selected:    Metoclopramide-associated dystonic reaction    Tardive  dyskinesia   Just a few more questions about medications, and then you're finished. Have you used any non-prescription medications or nasal sprays for your current symptoms? Examples include saline sprays, decongestants, NyQuil, and Tylenol. Select one.    No   Not selected:    Yes   Have you taken any monoamine oxidase inhibitor (MAOI) medications in the last 14 days? Examples include rasagiline (Azilect), selegiline (Eldepryl, Zelapar), isocarboxazid (Marplan), phenelzine (Nardil), and tranylcypromine (Parnate). Select one.    No, not that I know of   Not selected:    Yes   Do you take Kynmobi or Apokyn (apomorphine)? Select one.    No   Not selected:    Yes   Are you still taking these medications listed in your medical record? If you're not taking any of these, click Next. Select all that apply.    Vascepa 1 g capsule capsule    famotidine 40 MG tablet    BD Pen Needle Gina U/F 32G X 4 MM misc    vitamin D 1.25 MG (08612 UT) capsule capsule    fenofibrate 145 MG tablet    lidocaine 5 %    ondansetron ODT 8 MG disintegrating tablet    escitalopram 10 MG tablet    rosuvastatin 40 MG tablet    Xigduo XR 5-1000 MG tablet    norgestimate-ethinyl estradiol 0.18/0.215/0.25 MG-35 MCG per tablet    NovoLOG 100 UNIT/ML injection    Xigduo XR 5-1000 MG tablet    Omnipod 5 G6 Pod (Gen 5) misc    Dexcom G6 Sensor    Synthroid 175 MCG tablet    Baqsimi Two Pack 3 MG/DOSE powder    Dexcom G6 Transmitter misc    Accu-Chek SmartView test strip   Are you taking any other medications, vitamins, or supplements? Select one.    No   Not selected:    Yes   Have you ever had an allergic or bad reaction to any medication? Select one.    Yes   Not selected:    No   Have you had an allergic or bad reaction to any of these medications? Select all that apply.    No, not that I know of   Not selected:    Baloxavir (Xofluza)    Benzonatate (Tessalon Perles)    Fluconazole, itraconazole, or terconazole (brands include Diflucan, Sporanox,  "Terazol)    Oseltamivir (Tamiflu) or zanamivir (Relenza)    Paxlovid, nirmatrelvir, or ritonavir (Norvir)   Have you had an allergic or bad reaction to any of these antibiotic medications? Select all that apply.    No, not that I know of   Not selected:    Penicillin or any \"-cillin\" antibiotic, such as amoxicillin, ampicillin, dicloxacillin, nafcillin, or piperacillin (Brands include Augmentin, Unasyn, and Zosyn)    Tetracycline or any \"-cycline\" antibiotic, such as doxycycline, demeclocycline, minocycline (Brands include Declomycin, Doryx, Dynacin, Oracea, Monodox, Panmycin, and Vibramycin)    Ciprofloxacin or any \"-floxacin\" antibiotic, such as gemifloxacin, levofloxacin, moxifloxacin, or ofloxacin (Brands include Factive, Cipro, Floxin, and Levaquin)    Cephalexin or any \"cef-\" antibiotic, such as cefazolin, cefdinir, cefuroxime, ceftriaxone, ceftazidime, or cefepime (Brands include Ancef, Ceftin, Fortaz, Keflex, Maxipime, Rocephin, and Simplicef)    Azithromycin or any \"-thromycin\" antibiotic, such as erythromycin or clarithromycin (Brands include Biaxin, Erythrocin, Z-flaco, and Zithromax)    Clindamycin or lincomycin (Brands include Cleocin and Lincocin)   Have you had an allergic or bad reaction to any of these medications? Select all that apply.    No, not that I know of   Not selected:    Albuterol or a similar medication    Atropine    Corticosteroid (steroid) medication, including topical steroids, inhaled steroids, nasal steroids, or oral steroids (budesonide, ciclesonide, dexamethasone, flunisolide, fluticasone, methylprednisolone, triamcinolone, prednisone (or brand names Alvesco, Deltasone, Flovent, Medrol, Nasacort, Rhinocort, or Veramyst)    Metoclopramide (Reglan)    Ondansetron (Zuplenz, Zofran ODT, Zofran)    Prochlorperazine (Compazine)   Have you had an allergic or bad reaction to any of these eye drops, nasal sprays, or inhalers? Scroll to see all options. Select all that apply.    No, not that " I know of   Not selected:    Azelastine (Astelin, Astepro, Optivar)    Cromolyn (Crolom, NasalCrom)    Ipratropium (Atrovent)    Ketotifen (Alaway, Zaditor)    Pheniramine/naphazoline (Naphcon-A, Opcon-A, Visine-A)    Olopatadine (Pataday, Patanol, Pazeo)   Have you had an allergic or bad reaction to any of these non-prescription medications? Scroll to see all options. Select all that apply.    No, not that I know of   Not selected:    Acetaminophen (Tylenol)    Aspirin    Cetirizine (Zyrtec)    Dextromethorphan (Delsym, Robitussin, Vicks DayQuil Cough)    Diphenhydramine (Benadryl)    Fexofenadine (Allegra)    Guaifenesin (Mucinex)    Dextromethorphan (Delsym)    Ibuprofen (Advil, Motrin, Midol)    Loratadine (Alavert, Claritin)    Oxymetazoline (Afrin)    Phenylephrine (Sudafed PE)    Pseudoephedrine (Sudafed)   Are you allergic to milk or to the proteins found in milk (for example, whey or casein)? A milk allergy is different from lactose intolerance. Select one.    No, not that I know of   Not selected:    Yes   Have you ever had jaundice or liver problems as a result of taking amoxicillin-clavulanate (Augmentin)? Jaundice is a condition in which the skin and the whites of the eyes turn yellow. Select all that apply.    No, not that I know of   Not selected:    Yes, jaundice    Yes, liver problems   Have you ever had jaundice or liver problems as a result of taking azithromycin (Zithromax, Zmax)? Jaundice is a condition in which the skin and the whites of the eyes turn yellow. Select all that apply.    No, not that I know of   Not selected:    Yes, jaundice    Yes, liver problems   Do you need a doctor's note? A doctor's note confirms that you received care today and states when you can return to school or work. It does not contain information about your diagnosis or treatment plan. Your provider will make the final decision on whether to give you a doctor's note and for how long. Doctor's notes CANNOT be  backdated. We can't provide medical leave paperwork through this type of visit. If more paperwork is needed to request time off, contact your primary care provider. Select one.    Today only (1 day)   Not selected:    Today and tomorrow (2 days)    3 days    5 days    7 days    10 days    14 days    No   Is there anything you'd like to add about your symptoms? Please limit your comments to the symptoms asked about in this interview. If you include comments about other concerns, your provider may recommend that you be seen in person.   The patient did not enter any additional information.   ----------   Medical history   Medical history data does not currently exist for this patient.

## 2023-05-23 NOTE — EXTERNAL PATIENT INSTRUCTIONS
View Doctor's Note     Note   Drink plenty of water Over the counter pain relievers okay If symptoms do not improve in 3-5 days follow up with your primary care provider or urgent care   Diagnosis   Bacterial sinusitis   My name is PORSHA Morales, and I'm a healthcare provider at [facility]. I reviewed your interview, and I see that you have bacterial sinusitis.   I've given you a doctor's note for 1 day.   Medications   Your pharmacy   [facility] [id number] [place], [place] [id number] [phone]     Prescription   Amoxicillin-clavulanate (875mg/125mg): Take 1 tablet by mouth twice a day for 7 days for infection. This medication is an antibiotic. Take it exactly as directed. You must finish the entire course of medication, even if you feel better after the first few days of treatment.    Start taking the antibiotics I've prescribed right away. You need to finish the entire course of antibiotics, even if you start to feel better before the pills run out.   Non-prescription   Cally-Bunn Plus Cold (PE) (325mg/2mg/7.8mg): Drink 2 tablets fully dissolved in 4 ounces water every 4 hours as needed for cold symptoms. Do not exceed 8 tablets in a 24-hour period. May cause drowsiness. Do not drive or operate heavy machinery after taking this medication. Do not combine with other products that contain NSAIDs (aspirin, ibuprofen, naproxen, or others).    Some people develop a yeast infection after taking antibiotics. If you get a yeast infection, you can treat it with antifungal creams or suppositories. These are available without a prescription at [employer] and many [employer].   About your diagnosis   The sinuses are hollow spaces connected to the nasal passages. Sinusitis occurs when the sinuses swell and block the drainage of fluid and mucus from the nose, causing pain, pressure, and congestion. Fatigue, difficulty sleeping, or decreased appetite may accompany your symptoms.   More than  90% of sinus infections are caused by viruses. However, in certain cases, a sinus infection may be caused by bacteria. Bacterial sinus infections usually look like one of the following cases:    Severe sinus symptoms with a fever over 102F.    Sinus symptoms that have not improved at all after 10 days.    Cold symptoms that slowly improve but then worsen again after 5 or 6 days, usually with a high fever, headache, or nasal discharge.   What to expect   If you follow this treatment plan, you should start to feel better within a few days.   When to seek care   Call us at [phone]   with any sudden or unexpected symptoms.    Symptoms that last longer than 10 to 14 days.    Symptoms that get better for a few days, and then suddenly get worse.    Fever that measures over 103F or continues for more than 3 days.    Any vision changes.    Your headache worsens.    Stiff neck.    Swelling of your forehead or eyes.    Coughing up red or bloody mucus.    Swallowing becomes extremely difficult or impossible.    More than 5 episodes of diarrhea in a day.    More than 5 episodes of vomiting in a day.    Severe shortness of breath.    Severe chest pain   Other treatment    Rest! Your body needs rest to recover and fight infection.    Drink plenty of water to stay hydrated.    Use steam to soothe your sinuses: Breathe it in from a shower or a bowl of hot water. Placing a warm, moist washcloth over your nose and forehead may help relieve the sinus pain and pressure.    Try non-prescription saline nasal sprays to help your nasal symptoms. Try using a Neti Pot to flush out your stuffy nose and sinuses. Neti Pots are available at any drugstore without a prescription.    Avoid smoke and air pollution. Smoke can make infections worse. I encourage you to quit smoking, even for a week or two. Quitting will help your symptoms improve faster.   Prevention    Avoid close contact with other people when you're sick.    Cover your mouth  and nose when you cough or sneeze. Use a tissue or cough into your elbow. Make sure that used tissues go directly into the trash.    Avoid touching your eyes, nose, or mouth while you're sick.    Wash your hands often, especially after coughing, sneezing, or blowing your nose. If soap and water are not available, use an alcohol-based hand .    If you or someone in your home or workplace is sick, disinfect commonly used items. This includes door handles, tables, computers, remotes, and pens.    Coronavirus (COVID-19) information   Common symptoms of COVID-19 include fever, cough, shortness of breath, fatigue, muscle or body aches, headaches, new loss of sense of taste or smell, sore throat, stuffy or runny nose, nausea or vomiting, and diarrhea. Most people who get COVID-19 have mild symptoms and can rest at home until they get better. Elderly people and those with chronic medical problems may be at risk for more serious complications.   FAQs about the COVID-19 vaccine   There are four authorized COVID-19 vaccines: [employer]'s Alicia Vaccine (J&J/Alicia), Moderna, Novavax, and Pfizer-BioNTech (Pfizer). The J&J/Alicia and Novavax vaccines are approved for use in people aged 18 and older. The Moderna and Pfizer vaccines are approved for those aged 6 months and older. All four are available at no cost. Even if you don't have health insurance, you can still get the COVID-19 vaccine for free.   Which vaccine is the best? Which vaccine should I get?   All four vaccines are highly effective. Even if you get COVID-19 after being vaccinated, all of the vaccines help prevent severe disease, hospitalization, and complications.   Most people should get whichever vaccine is first available to them. However, women younger than 50 years old should consider the rare risk of blood clots with low platelets after vaccination with the J&J/Alicia vaccine. This risk hasn't been seen with the other three vaccines.    Are the vaccines safe?   Yes. Hundreds of millions of people in the [place] have already safely received COVID-19 vaccines under the most intense safety monitoring in the history of the [place].   Do I need the vaccine if I've already had COVID?   Yes. Vaccination helps protect you even if you've already had COVID.   If you had COVID-19 and had symptoms, wait to get vaccinated until you've recovered and completed your isolation period.   If you tested positive for COVID-19 but did not have symptoms, you can get vaccinated after 5 full days have passed since you had a positive test, as long as you don't develop symptoms.   How many doses of the vaccine do I need?   Visit www.cdc.gov/coronavirus/2019-ncov/vaccines/stay-up-to-date.html   to find out how to stay up to date with your COVID-19 vaccines.   I'm immunocompromised. How many doses of the vaccine do I need?   For information on how immunocompromised people can stay up to date with their COVID-19 vaccines, visit www.cdc.gov/coronavirus/2019-ncov/vaccines/recommendations/immuno.html  .   What are the common side effects of the vaccine?   A sore arm, tiredness, headache, and muscle pain may occur within two days of getting the vaccine and last a day or two. For the Moderna or Pfizer vaccines, side effects are more common after the second dose. People over the age of 55 are less likely to have side effects than younger people.   After I'm up to date on vaccines, can I still get or spread COVID?   Yes, you can still get COVID, but your disease should be milder. And your risk of serious illness, hospitalization, and complications will be much lower, especially if you're up to date. Unfortunately, you can still spread COVID if you've been vaccinated. That's why it's important to follow isolation guidelines if you get sick or test positive.   After I'm up to date on vaccines, can I go back to normal?   You should still wear a mask indoors in public if:    It's required by laws,  rules, regulations, or local guidance.    You have a weakened immune system.    Your age puts you at increased risk of severe disease.    You have a medical condition that puts you at increased risk of severe disease.    Someone in your household has a weakened immune system, is at increased risk for severe disease, or is unvaccinated.    You're in an area of high transmission.   Where can I get a COVID-19 vaccine?   Visit [facility]'s website for more information. To find a COVID-19 vaccination site near you, visit www.[email or web address]/  , call [phone]  , or text your zip code to [phone] ([email or web address]). Message and data rates may apply.   What about travel?   Travel increases your risk of exposure to COVID-19. For more information, see www.cdc.gov/coronavirus/2019-ncov/travelers/index.html  .   I've had close contact with someone who has COVID. Do I need to quarantine, and if so, for how long?   For the most current answer, including a calculator to determine whether you need to stay home and for how long, visit www.cdc.gov/coronavirus/2019-ncov/your-health/quarantine-isolation.html  .   I've tested positive for COVID. How long do I need to isolate?   For the latest recommendations, including a calculator to determine how long you need to stay home, visit www.cdc.gov/coronavirus/2019-ncov/your-health/quarantine-isolation.html  .   What if I develop symptoms that might be from COVID?   For the latest recommendations on what to do if you're sick, including when to seek emergency care, visit www.cdc.gov/coronavirus/2019-ncov/if-you-are-sick/steps-when-sick.html  .    Flu vaccine information   Who should get a flu vaccine?   Everyone 6 months of age and older should get a yearly flu vaccine.   When should I get vaccinated?   You should get a flu vaccine by the end of October. Once you're vaccinated, it takes about two weeks for antibodies to develop and protect you against the flu. That's why it's important to get  vaccinated as soon as possible.   After October, is it too late to get vaccinated?   No. You should still get vaccinated. As long as the flu viruses are still in your community, flu vaccines will remain available, even into January of next year or later.   Why do I need a flu vaccine EVERY year?   Flu viruses are constantly changing, so flu vaccines are usually updated from one season to the next. Your protection from the flu vaccine also lessens over time.   Is the flu vaccine safe?   Yes. Over the last 50 years, hundreds of millions of Americans have safely received the flu vaccines.   What are the side effects of flu vaccines?   You CANNOT get the flu from a flu vaccine. Common side effects of the flu shot include soreness, redness and/or swelling where the shot was given, low grade fever, and aches. Common side effects of the nasal spray flu vaccine for adults include runny nose, headaches, sore throat, and cough. For children, side effects include wheezing, vomiting, muscle aches, and fever.   Does the flu vaccine increase your risk of getting COVID-19?   No. There is no evidence that getting a flu vaccine increases your risk of getting COVID-19.   Is it safe to get the flu vaccine along with a COVID-19 vaccine?   Yes. It's safe to get the flu vaccine with a COVID-19 vaccine or booster.   Contact your healthcare provider TODAY for details on when and where to get your flu vaccine.   Your provider   Your diagnosis was provided by PORSHA Morales, a member of your trusted care team at [facility].   If you have any questions, call us at [phone]  .   View Doctor's Note     Expires on 06/22/23

## 2023-05-24 ENCOUNTER — SPECIALTY PHARMACY (OUTPATIENT)
Dept: ENDOCRINOLOGY | Age: 43
End: 2023-05-24
Payer: COMMERCIAL

## 2023-05-24 NOTE — PROGRESS NOTES
"Specialty Pharmacy Refill Coordination Note     Cora \"Cora Gutiérrez\" is a 43 y.o. female contacted today regarding refills of  1 specialty medication(s).    Reviewed and verified with patient:       Specialty medication(s) and dose(s) confirmed: yes    Refill Questions    Flowsheet Row Most Recent Value   Changes to allergies? No   Changes to medications? No   New conditions since last clinic visit No   Unplanned office visit, urgent care, ED, or hospital admission in the last 4 weeks  No   How does patient/caregiver feel medication is working? Very good   Financial problems or insurance changes  No   If yes, describe changes in insurance or financial issues. NA   Since the previous refill, were any specialty medication doses or scheduled injections missed or delayed?  No   If yes, please provide the amount NA   Why were doses missed? NA   Does this patient require a clinical escalation to a pharmacist? No          Delivery Questions    Flowsheet Row Most Recent Value   Delivery method Other (Comment)  [Beeline]   Delivery address correct? Yes   Delivery phone number [phone]   Preferred delivery time? Anytime   Number of medications in delivery 1   Medication being filled and delivered Novolog   Doses left of specialty medications 1 week   Is there any medication that is due not being filled? No   Supplies needed? No supplies needed   Cooler needed? Yes   Do any medications need mixed or dated? No   Copay form of payment Credit card on file   Additional comments $25   Questions or concerns for the pharmacist? No   Explain any questions or concerns for the pharmacist NA   Are any medications first time fills? No   Shipment status Cooler packed            Medication Adherence    Adherence tools used: patient uses a pill box to manage medications  Support network for adherence: family member, healthcare provider          Follow-up: 23 day(s)     Thais Whitten  Specialty Pharmacy Technician      "

## 2023-06-09 ENCOUNTER — SPECIALTY PHARMACY (OUTPATIENT)
Dept: ENDOCRINOLOGY | Age: 43
End: 2023-06-09
Payer: COMMERCIAL

## 2023-06-09 NOTE — PROGRESS NOTES
"Specialty Pharmacy Refill Coordination Note     Cora \"Cora Gutiérrez\" is a 43 y.o. female contacted today regarding refills of  2 specialty medication(s).    Reviewed and verified with patient:       Specialty medication(s) and dose(s) confirmed: yes    Refill Questions    Flowsheet Row Most Recent Value   Changes to allergies? No   Changes to medications? No   New conditions since last clinic visit No   Unplanned office visit, urgent care, ED, or hospital admission in the last 4 weeks  No   How does patient/caregiver feel medication is working? Very good   Financial problems or insurance changes  No   If yes, describe changes in insurance or financial issues. NA   Since the previous refill, were any specialty medication doses or scheduled injections missed or delayed?  No   If yes, please provide the amount NA   Why were doses missed? NA   Does this patient require a clinical escalation to a pharmacist? No          Delivery Questions    Flowsheet Row Most Recent Value   Delivery method Other (Comment)  [Beeline]   Delivery address correct? Yes   Delivery phone number [phone]   Preferred delivery time? Anytime   Number of medications in delivery 2   Medication being filled and delivered Vitamin D and Xigduo   Doses left of specialty medications 1 week   Is there any medication that is due not being filled? No   Supplies needed? No supplies needed   Cooler needed? No   Do any medications need mixed or dated? No   Copay form of payment Credit card on file   Additional comments $15.42   Questions or concerns for the pharmacist? No   Explain any questions or concerns for the pharmacist NA   Are any medications first time fills? No   Shipment status Cooler packed            Medication Adherence    Adherence tools used: patient uses a pill box to manage medications  Support network for adherence: family member, healthcare provider          Follow-up: 25 day(s)     Thais Whitten  Specialty Pharmacy Technician      "

## 2023-07-12 ENCOUNTER — TELEPHONE (OUTPATIENT)
Dept: ENDOCRINOLOGY | Age: 43
End: 2023-07-12

## 2023-07-12 NOTE — TELEPHONE ENCOUNTER
"Caller: Cora Gutiérrez \"Cora Gutiérrez\"    Relationship to patient: Self    Best call back number: [phone]    Patient is needing: PATIENT CALLED TO SCHEDULE LABS FOR BEFORE HER 8/31/23 APPOINTMENT. SHE STATED IT IS OKAY TO LEAVE INFORMATION ON HER VOICEMAIL.           "

## 2023-07-24 ENCOUNTER — SPECIALTY PHARMACY (OUTPATIENT)
Dept: ENDOCRINOLOGY | Age: 43
End: 2023-07-24
Payer: COMMERCIAL

## 2023-07-24 NOTE — PROGRESS NOTES
"Specialty Pharmacy Refill Coordination Note     Cora \"Cora Gutiérrez\" is a 43 y.o. female contacted today regarding refills of  4 specialty medication(s).    Reviewed and verified with patient:       Specialty medication(s) and dose(s) confirmed: yes    Refill Questions      Flowsheet Row Most Recent Value   Changes to allergies? No   Changes to medications? No   New conditions since last clinic visit No   Unplanned office visit, urgent care, ED, or hospital admission in the last 4 weeks  No   How does patient/caregiver feel medication is working? Very good   Financial problems or insurance changes  No   Since the previous refill, were any specialty medication doses or scheduled injections missed or delayed?  No   Does this patient require a clinical escalation to a pharmacist? No            Delivery Questions      Flowsheet Row Most Recent Value   Delivery method Other (Comment)  [BEE LINE SHIP 7/28/23 DELIVERY 7/31/23]   Delivery address correct? Yes   Delivery phone number [phone]   Preferred delivery time? Anytime   Number of medications in delivery 4   Medication being filled and delivered DEXCOM G6 SENSOR, DEXCOM G6 TRANSMITTER, FENOFIBRATE, ROSUVASTATIN   Doses left of specialty medications N/A   Is there any medication that is due not being filled? Yes   Medication that does not need to be filled at this time BAQSIMI   Why are other medications not being filled? PATIENT DOESN'T NEED AT THIS TIME   Supplies needed? No supplies needed   Cooler needed? No   Do any medications need mixed or dated? No   Copay form of payment Credit card on file   Additional comments $25.18   Questions or concerns for the pharmacist? No   Explain any questions or concerns for the pharmacist N/A   Are any medications first time fills? No   Tracking number for delivery N/A              Medication Adherence    Adherence tools used: patient uses a pill box to manage medications  Support network for adherence: family member, " healthcare provider          Follow-up: 23 day(s)     Florence Calles, Pharmacy Technician  Specialty Pharmacy Technician

## 2023-08-31 ENCOUNTER — SPECIALTY PHARMACY (OUTPATIENT)
Dept: ENDOCRINOLOGY | Age: 43
End: 2023-08-31
Payer: COMMERCIAL

## 2023-08-31 RX ORDER — ERGOCALCIFEROL 1.25 MG/1
50000 CAPSULE ORAL
Qty: 30 CAPSULE | Refills: 0 | OUTPATIENT
Start: 2023-08-31

## 2023-08-31 RX ORDER — DAPAGLIFLOZIN AND METFORMIN HYDROCHLORIDE 5; 1000 MG/1; MG/1
1 TABLET, FILM COATED, EXTENDED RELEASE ORAL 2 TIMES DAILY
Qty: 60 TABLET | Refills: 1 | Status: SHIPPED | OUTPATIENT
Start: 2023-08-31 | End: 2023-11-29

## 2023-08-31 RX ORDER — INSULIN ASPART 100 [IU]/ML
INJECTION, SOLUTION INTRAVENOUS; SUBCUTANEOUS
Qty: 30 ML | Refills: 0 | Status: SHIPPED | OUTPATIENT
Start: 2023-08-31

## 2023-08-31 NOTE — PROGRESS NOTES
" Specialty Pharmacy Patient Management Program  Endocrinology Refill Outreach      Cora \"Cora Gutiérrez\" is a 43 y.o. female contacted today regarding refills of her medication(s).    Specialty medication(s) and dose(s) confirmed: Yes    Refill Questions      Flowsheet Row Most Recent Value   Changes to allergies? No   Changes to medications? Yes  [No longer to take Vitamin D per Thais Wheatley, patient aware]   New conditions since last clinic visit No   Unplanned office visit, urgent care, ED, or hospital admission in the last 4 weeks  No   How does patient/caregiver feel medication is working? Very good   Financial problems or insurance changes  No   Since the previous refill, were any specialty medication doses or scheduled injections missed or delayed?  No   Does this patient require a clinical escalation to a pharmacist? No          Delivery Questions      Flowsheet Row Most Recent Value   Delivery method Other (Comment)  [BeeLine]   Delivery address correct? Yes   Delivery phone number [phone]   Preferred delivery time? Anytime   Number of medications in delivery 4   Medication being filled and delivered Dexcom, Vascepa, Xigduo, fenofibrate   Is there any medication that is due not being filled? No   Supplies needed? No supplies needed   Cooler needed? Yes   Do any medications need mixed or dated? No   Copay form of payment Credit card on file   Additional comments $29.18   Questions or concerns for the pharmacist? No   Are any medications first time fills? No   Shipment status Delivery complete, Cooler packed            Medication Adherence    Adherence tools used: patient uses a pill box to manage medications  Support network for adherence: family member, healthcare provider         Follow-Up: in one month    Alannah Rosas PharmD, BCPS, BCCCP  8/31/2023 14:30 EDT    "

## 2023-09-07 ENCOUNTER — SPECIALTY PHARMACY (OUTPATIENT)
Dept: ENDOCRINOLOGY | Age: 43
End: 2023-09-07
Payer: COMMERCIAL

## 2023-09-07 NOTE — PROGRESS NOTES
Specialty Pharmacy Patient Management Program  Endocrinology Reassessment     Cora Gutiérrez is a 43 y.o. female seen by an Endocrinology provider for Type 2 Diabetes and enrolled in the Endocrinology Patient Management program offered by [facility].  A follow-up outreach was conducted, including assessment of continued therapy appropriateness, medication adherence, and side effect incidence and management for Vascepa and Xigduo.    Changes to Insurance Coverage or Financial Support  No changes    Relevant Past Medical History and Comorbidities  Relevant medical history and concomitant health conditions were discussed with the patient. The patient's chart has been reviewed for relevant past medical history and comorbid health conditions and updated as necessary.   Past Medical History:   Diagnosis Date    BMI 40.0-44.9, adult     Diabetes mellitus     Fatty liver     H/O Pancreatitis, acute     Hyperlipidemia     Hypothyroidism     Left ovarian cyst     Type 2 diabetes mellitus      Social History     Socioeconomic History    Marital status:     Number of children: 0   Tobacco Use    Smoking status: Smoker, Current Status Unknown     Packs/day: 0.50     Types: Electronic Cigarette, Cigarettes    Smokeless tobacco: Never    Tobacco comments:     quit cigarrettes 4/01/19, now ecig    Vaping Use    Vaping Use: Every day   Substance and Sexual Activity    Alcohol use: Yes     Comment: 1 every 3 months     Drug use: No    Sexual activity: Yes     Partners: Male     Birth control/protection: OCP          Hospitalizations and Urgent Care Since Last Assessment  ED Visits, Admissions, or Hospitalizations: None  Urgent Office Visits: None    Allergies  Known allergies and reactions were discussed with the patient. The patient's chart has been reviewed for allergy information and updated as necessary.   Allergies   Allergen Reactions    Trulicity [Dulaglutide] GI Intolerance     Also h/o  pancreatitis; avoid GLP-1 analogs     Allergies reviewed by Alannah Rosas, PharmD on 9/7/2023 at 12:12 PM    Relevant Laboratory Values  A1C Last 3 Results          11/30/2022    08:53 4/4/2023    09:46 8/17/2023    09:51   HGBA1C Last 3 Results   Hemoglobin A1C 7.90  8.30  8.30      Lab Results   Component Value Date    HGBA1C 8.30 (H) 08/17/2023     Lab Results   Component Value Date    GLUCOSE 121 (H) 08/17/2023    CALCIUM 9.5 08/17/2023     08/17/2023    K 4.3 08/17/2023    CO2 22.8 08/17/2023     08/17/2023    BUN 14 08/17/2023    CREATININE 0.72 08/17/2023    EGFRIFAFRI 99 01/19/2022    EGFRIFNONA 86 01/19/2022    BCR 19.4 08/17/2023     Lab Results   Component Value Date    CHLPL 173 08/17/2023    TRIG 369 (H) 08/17/2023    HDL 37 (L) 08/17/2023    LDL 77 08/17/2023     Microalbumin          8/17/2023    09:51   Microalbumin   Microalbumin, Urine 4.3      Current Medication List  This medication list has been reviewed with the patient and evaluated for any interactions or necessary modifications/recommendations, and updated to include all prescription medications, OTC medications, and supplements the patient is currently taking.  This list reflects what is contained in the patient's profile, which has also been marked as reviewed to communicate to other providers it is the most up to date version of the patient's current medication therapy.     Current Outpatient Medications:     Continuous Blood Gluc Sensor (Dexcom G6 Sensor), Apply  topically Every 10 (Ten) Days for 90 days., Disp: 9 each, Rfl: 1    Continuous Blood Gluc Transmit (Dexcom G6 Transmitter) misc, USE AS DIRECTED AND REPLACE EVERY THREE MONTHS, Disp: 1 each, Rfl: 4    dapagliflozin-metformin HCl ER (Xigduo XR) 5-1000 MG tablet, Take 1 tablet by mouth 2 (Two) Times a Day for 90 days., Disp: 60 tablet, Rfl: 1    escitalopram (LEXAPRO) 10 MG tablet, Take 1 tablet by mouth Daily., Disp: 90 tablet, Rfl: 3    fenofibrate (TRICOR) 145 MG  tablet, Take 1 tablet by mouth Daily., Disp: 90 tablet, Rfl: 0    Glucagon (Baqsimi Two Pack) 3 MG/DOSE powder, 3 mg into the nostril(s) as directed by provider As Needed (hypoglycemia on pump)., Disp: 2 each, Rfl: 1    glucose blood (Accu-Chek SmartView) test strip, Check 4 times daily Use as instructed, Disp: 100 each, Rfl: 12    insulin aspart (NovoLOG FlexPen) 100 UNIT/ML solution pen-injector sc pen, Inject 120 Units under the skin into the appropriate area as directed Daily. Up to 120u daily with pump (Patient not taking: Reported on 4/17/2023), Disp: 45 mL, Rfl: 0    Insulin Aspart (NovoLOG) 100 UNIT/ML injection, Use 100 units daily via Omnipod as directed., Disp: 30 mL, Rfl: 0    Insulin Disposable Pump (Omnipod 5 G6 Pod, Gen 5,) misc, ONE UNDER THE SKIN EVERY OTHER DAY, Disp: 45 each, Rfl: 1    Insulin Pen Needle (BD Pen Needle Gina U/F) 32G X 4 MM misc, USE FIVE TIMES DAILY WITH INSULIN, Disp: 500 each, Rfl: 0    lidocaine (LIDODERM) 5 %, Place 1 patch on the skin as directed by provider Daily As Needed (back pain)., Disp: , Rfl:     norgestimate-ethinyl estradiol (Tri-Sprintec) 0.18/0.215/0.25 MG-35 MCG per tablet, Take 1 tablet by mouth Daily., Disp: 84 tablet, Rfl: 3    rosuvastatin (CRESTOR) 40 MG tablet, Take 1 tablet by mouth Daily., Disp: 90 tablet, Rfl: 9    Synthroid 175 MCG tablet, TAKE 1 TABLET  DAILY IN THE MORNING AS DIRECTED ON EMPTY STOMACH WITH WATER ONLY., Disp: 90 tablet, Rfl: 1    Vascepa 1 g capsule capsule, Take 2 capsules by mouth 2 (Two) Times a Day With Meals., Disp: 360 capsule, Rfl: 1    vitamin D (ERGOCALCIFEROL) 1.25 MG (10484 UT) capsule capsule, Take 1 capsule by mouth Every 7 (Seven) Days. (Patient not taking: Reported on 9/7/2023), Disp: 30 capsule, Rfl: 0    Medicines reviewed by Alannah Rosas, PharmD on 9/7/2023 at 12:13 PM    Drug Interactions  No DDIs identified    Recommended Medications Assessment  Aspirin: Not Taking Currently  Statin: Currently Taking   ACEi/ARB:  Not Taking Currently    Adverse Drug Reactions  Medication tolerability: Tolerating with no to minimal ADRs  Medication plan: Continue therapy with normal follow-up  Plan for ADR Management: N/A    Adherence, Self-Administration, and Current Therapy Problems  Adherence related to the patient's specialty therapy was discussed with the patient. The Adherence segment of this outreach has been reviewed and updated.     Adherence Questions  Medication(s) assessed: Xigduo, Vascepa  On average, how many doses/injections does the patient miss per month?: 0  What are the identified reasons for non-adherence or missed doses? : no problems identfied  What is the estimated medication adherence level?: %  Based on the patient/caregiver response and refill history, does this patient require an MTP to track adherence improvements?: no    Additional Barriers to Patient Self-Administration: None identified  Methods for Supporting Patient Self-Administration: N/A    Open Medication Therapy Problems  No medication therapy recommendations to display    Goals of Therapy  Goals related to the patient's specialty therapy were discussed with the patient. The Patient Goals segment of this outreach has been reviewed and updated.   Goals Addressed Today        Specialty Pharmacy General Goal      Reduce triglycerides < 150 mg/dL    Lab Results   Component Value Date    TRIG 369 (H) 08/17/2023    TRIG 265 (H) 11/30/2022    TRIG 340 (H) 04/25/2022    TRIG 233 (H) 01/19/2022    TRIG 478 (H) 11/06/2021       **Improved from > 2000 mg/dL in Nov. 2021 during acute pancreatitis admission       Specialty Pharmacy General Goal      Reduce HbA1c < 7.5%    Lab Results   Component Value Date    HGBA1C 8.30 (H) 08/17/2023    HGBA1C 8.30 (H) 04/04/2023    HGBA1C 7.90 (H) 11/30/2022    HGBA1C 8.6 (H) 04/25/2022    HGBA1C 7.9 (H) 01/19/2022                 Quality of Life Assessment   Quality of Life related to the patient's enrollment in the patient  management program and services provided was discussed with the patient. The QOL segment of this outreach has been reviewed and updated.  Quality of Life Improvement Scale: Somewhat better    Reassessment Plan & Follow-Up  1. Medication Therapy Changes: Continue current therapy: Novolog with OmniPod, Xigduo 5/1000mg twice daily, and Vascepa 2g twice daily.    2. Related Plans, Therapy Recommendations, or Issues to Be Addressed: None  3. Pharmacist to perform regular assessments no more than (6) months from the previous assessment.  4. Care Coordinator to set up future refill outreaches, coordinate prescription delivery, and escalate clinical questions to pharmacist.    Attestation  Therapeutic appropriateness: Appropriate   I attest the patient was actively involved in and has agreed to the above plan of care.  If the prescribed therapy is at any point deemed not appropriate based on the current or future assessments, a consultation will be initiated with the patient's specialty care provider to determine the best course of action. The revised plan of therapy will be documented along with any required assessments and/or additional patient education provided.     Alannah Rosas, PharmD, BCPS, BCCCP  Clinical Specialty Pharmacist, Endocrinology  9/7/2023  12:16 EDT

## 2023-09-25 ENCOUNTER — SPECIALTY PHARMACY (OUTPATIENT)
Dept: ENDOCRINOLOGY | Age: 43
End: 2023-09-25

## 2023-09-25 NOTE — PROGRESS NOTES
"Specialty Pharmacy Refill Coordination Note     Cora \"Cora Gutiérrez\" is a 43 y.o. female contacted today regarding refills of  5 specialty medication(s).    Reviewed and verified with patient:       Specialty medication(s) and dose(s) confirmed: yes    Refill Questions      Flowsheet Row Most Recent Value   Changes to allergies? No   Changes to medications? No   New conditions since last clinic visit No   Unplanned office visit, urgent care, ED, or hospital admission in the last 4 weeks  No   How does patient/caregiver feel medication is working? Good   Financial problems or insurance changes  No   Since the previous refill, were any specialty medication doses or scheduled injections missed or delayed?  No   Does this patient require a clinical escalation to a pharmacist? No            Delivery Questions      Flowsheet Row Most Recent Value   Delivery method Other (Comment)  [beeline]   Delivery address correct? Yes   Delivery phone number [phone]   Preferred delivery time? Anytime   Number of medications in delivery 5   Medication being filled and delivered Xigduo, Vascepa,Dexcom sensor, dexcom transmitter, fenofibrate   Doses left of specialty medications 1 week   Is there any medication that is due not being filled? No   Supplies needed? No supplies needed   Cooler needed? No   Do any medications need mixed or dated? No   Copay form of payment Credit card on file   Additional comments $14.18   Questions or concerns for the pharmacist? No   Are any medications first time fills? No   Shipment status Delivery complete              Medication Adherence    Adherence tools used: patient uses a pill box to manage medications  Support network for adherence: family member, healthcare provider          Follow-up: 25 day(s)     Debbie Washburn, Pharmacy Technician  Specialty Pharmacy Technician        "

## 2023-10-09 DIAGNOSIS — E03.9 ACQUIRED HYPOTHYROIDISM: Primary | ICD-10-CM

## 2023-10-09 RX ORDER — LEVOTHYROXINE SODIUM 175 MCG
TABLET ORAL
Qty: 30 TABLET | Refills: 1 | Status: SHIPPED | OUTPATIENT
Start: 2023-10-09

## 2023-10-18 ENCOUNTER — SPECIALTY PHARMACY (OUTPATIENT)
Dept: ENDOCRINOLOGY | Age: 43
End: 2023-10-18
Payer: COMMERCIAL

## 2023-10-18 RX ORDER — ROSUVASTATIN CALCIUM 40 MG/1
40 TABLET, COATED ORAL DAILY
Qty: 30 TABLET | Refills: 0 | Status: SHIPPED | OUTPATIENT
Start: 2023-10-18

## 2023-10-18 RX ORDER — DAPAGLIFLOZIN AND METFORMIN HYDROCHLORIDE 5; 1000 MG/1; MG/1
1 TABLET, FILM COATED, EXTENDED RELEASE ORAL 2 TIMES DAILY
Qty: 60 TABLET | Refills: 0 | Status: SHIPPED | OUTPATIENT
Start: 2023-10-18

## 2023-10-18 RX ORDER — FENOFIBRATE 145 MG/1
145 TABLET, COATED ORAL DAILY
Qty: 30 TABLET | Refills: 0 | Status: SHIPPED | OUTPATIENT
Start: 2023-10-18

## 2023-10-18 NOTE — PROGRESS NOTES
"Specialty Pharmacy Refill Coordination Note     Cora \"Cora Gutiérrez\" is a 43 y.o. female contacted today regarding refills of  5 specialty medication(s).    Reviewed and verified with patient:       Specialty medication(s) and dose(s) confirmed: yes    Refill Questions      Flowsheet Row Most Recent Value   Changes to allergies? No   Changes to medications? No   New conditions since last clinic visit No   Unplanned office visit, urgent care, ED, or hospital admission in the last 4 weeks  No   How does patient/caregiver feel medication is working? Good   Financial problems or insurance changes  No   Since the previous refill, were any specialty medication doses or scheduled injections missed or delayed?  No   Does this patient require a clinical escalation to a pharmacist? No            Delivery Questions      Flowsheet Row Most Recent Value   Delivery method Other (Comment)  [beeline]   Delivery address correct? Yes   Delivery phone number [phone]   Preferred delivery time? Anytime   Number of medications in delivery 5   Medication(s) being filled and delivered Rosuvastatin Calcium, Continuous Blood Gluc Sensor, Dapagliflozin-Metformin Hcl, Fenofibrate (Fibric Acid Derivatives), Icosapent Ethyl   Doses left of specialty medications 1 week   Is there any medication that is due not being filled? No   Supplies needed? No supplies needed   Cooler needed? No   Copay form of payment Credit card on file   Additional comments $45.18   Questions or concerns for the pharmacist? No   Are any medications first time fills? No   Shipment status Delivery complete              Medication Adherence          Adherence tools used: patient uses a pill box to manage medications      Support network for adherence: family member, healthcare provider                Follow-up: 25 day(s)     Debbie Washburn, Pharmacy Technician  Specialty Pharmacy Technician        "

## 2023-11-01 ENCOUNTER — OFFICE VISIT (OUTPATIENT)
Dept: ENDOCRINOLOGY | Age: 43
End: 2023-11-01
Payer: COMMERCIAL

## 2023-11-01 VITALS
BODY MASS INDEX: 40.75 KG/M2 | DIASTOLIC BLOOD PRESSURE: 90 MMHG | TEMPERATURE: 96.6 F | OXYGEN SATURATION: 98 % | HEART RATE: 98 BPM | HEIGHT: 63 IN | SYSTOLIC BLOOD PRESSURE: 130 MMHG | WEIGHT: 230 LBS

## 2023-11-01 DIAGNOSIS — Z79.4 TYPE 2 DIABETES MELLITUS WITH HYPERGLYCEMIA, WITH LONG-TERM CURRENT USE OF INSULIN: Primary | ICD-10-CM

## 2023-11-01 DIAGNOSIS — E03.9 ACQUIRED HYPOTHYROIDISM: ICD-10-CM

## 2023-11-01 DIAGNOSIS — E11.65 TYPE 2 DIABETES MELLITUS WITH HYPERGLYCEMIA, WITH LONG-TERM CURRENT USE OF INSULIN: Primary | ICD-10-CM

## 2023-11-01 DIAGNOSIS — E78.2 HYPERLIPEMIA, MIXED: ICD-10-CM

## 2023-11-01 LAB — HBA1C MFR BLD: 8.4 % (ref 4.8–5.6)

## 2023-11-01 PROCEDURE — 95251 CONT GLUC MNTR ANALYSIS I&R: CPT | Performed by: NURSE PRACTITIONER

## 2023-11-01 PROCEDURE — 99214 OFFICE O/P EST MOD 30 MIN: CPT | Performed by: NURSE PRACTITIONER

## 2023-11-01 RX ORDER — TIRZEPATIDE 2.5 MG/.5ML
2.5 INJECTION, SOLUTION SUBCUTANEOUS WEEKLY
Qty: 2 ML | Refills: 0 | Status: SHIPPED | OUTPATIENT
Start: 2023-11-01

## 2023-11-01 RX ORDER — AMOXICILLIN AND CLAVULANATE POTASSIUM 875; 125 MG/1; MG/1
TABLET, FILM COATED ORAL
COMMUNITY
Start: 2023-10-10

## 2023-11-01 RX ORDER — BLOOD-GLUCOSE METER
EACH MISCELLANEOUS 3 TIMES DAILY
Qty: 1 KIT | Refills: 0 | Status: SHIPPED | OUTPATIENT
Start: 2023-11-01

## 2023-11-01 RX ORDER — LIRAGLUTIDE 6 MG/ML
INJECTION SUBCUTANEOUS EVERY 24 HOURS
COMMUNITY
End: 2023-11-01

## 2023-11-01 RX ORDER — LANCETS
EACH MISCELLANEOUS
Qty: 300 EACH | Refills: 4 | Status: SHIPPED | OUTPATIENT
Start: 2023-11-01 | End: 2024-10-31

## 2023-11-01 NOTE — PROGRESS NOTES
"Chief Complaint  Diabetes (Omnipod attached)    Subjective        Cora Gutiérrez presents to [facility] ENDOCRINOLOGY  History of Present Illness    Type 2 dm diagnosed April 2015, insulin started at that time      Today in clinic pt reports being on omnipod 5 with novolog, xigduo 5-1000 BID  Used victoza that she had left at home after last visit without complications or side effects, verbalizes understanding of increased risk of pancreatitis   Down 8 pounds and wanting to use a weekly form of victoza to help her manage her chronic obesity and DM2  Backup plan: insulin pens  Glucagon: baqsimi  No known diabetic complications   Episodes of hypoglycemia - denies  On statin and vascepa with fenofibrate     Cgm review  54% time in range  0% low  46% high  Gmi 7.7%  Average glucose 184  99% automated mode  91% basal  9% bolus     Hypothyroid  Synthroid 175mcg daily  Feeling well overall     Objective   Vital Signs:  /90   Pulse 98   Temp 96.6 °F (35.9 °C) (Temporal)   Ht 160 cm (62.99\")   Wt 104 kg (230 lb)   SpO2 98%   BMI 40.76 kg/m²   Estimated body mass index is 40.76 kg/m² as calculated from the following:    Height as of this encounter: 160 cm (62.99\").    Weight as of this encounter: 104 kg (230 lb).             Physical Exam  Vitals reviewed.   Constitutional:       General: She is not in acute distress.  HENT:      Head: Normocephalic and atraumatic.   Cardiovascular:      Rate and Rhythm: Normal rate.   Pulmonary:      Effort: Pulmonary effort is normal. No respiratory distress.   Musculoskeletal:         General: No signs of injury. Normal range of motion.      Cervical back: Normal range of motion and neck supple.   Skin:     General: Skin is warm and dry.   Neurological:      Mental Status: She is alert and oriented to person, place, and time. Mental status is at baseline.   Psychiatric:         Mood and Affect: Mood normal.         Behavior: Behavior normal.         Thought " Content: Thought content normal.         Judgment: Judgment normal.        Result Review :  The following data was reviewed by: PORSHA Shaw on 11/01/2023:  Common labs          11/30/2022    08:53 4/4/2023    09:46 8/17/2023    09:51   Common Labs   Glucose 120   121    BUN 13   14    Creatinine 0.92   0.72    Sodium 140   137    Potassium 4.5   4.3    Chloride 103   101    Calcium 9.3   9.5    Total Protein   6.8    Albumin   4.1    Total Bilirubin   <0.2    Alkaline Phosphatase   53    AST (SGOT)   21    ALT (SGPT)   18    Total Cholesterol 161   173    Triglycerides 265   369    HDL Cholesterol 35   37    LDL Cholesterol  82   77    Hemoglobin A1C 7.90  8.30  8.30    Microalbumin, Urine   4.3                   Assessment and Plan   Diagnoses and all orders for this visit:    1. Type 2 diabetes mellitus with hyperglycemia, with long-term current use of insulin (Primary)  -     Hemoglobin A1c    2. Hyperlipemia, mixed    3. Acquired hypothyroidism    4. BMI 40.0-44.9, adult    Other orders  -     Tirzepatide (Mounjaro) 2.5 MG/0.5ML solution pen-injector; Inject 0.5 mL under the skin into the appropriate area as directed 1 (One) Time Per Week. Start with 2.5 mg for 4 weeks then go to 5 mg weekly  Dispense: 2 mL; Refill: 0  -     Blood Glucose Monitoring Suppl (Accu-Chek Kristina Plus) w/Device kit; Use to check blood sugar 3 times a day.  Dispense: 1 kit; Refill: 0  -     glucose blood test strip; Check Blood Sugar 3 (Three) Times a Day.  Dispense: 300 each; Refill: 4             Follow Up   Return in about 3 months (around 2/1/2024).    Reviewed risk of pancreatitis, we feel the risk of obesity and uncontrolled diabetes outweigh the risk and will add mounjaro cautiously  Reviewed safety of medication: understands risk of recurrence of pancreatitis on glp-1, no alcohol consumption, follows a healthy diet maintains her medications with vascepa and a low triglyceride diet  Cgm reviewed, needs to improve bolusing  at lunch and dinner to improve post prandial hyperglycemia    Labs today    Patient was given instructions and counseling regarding her condition or for health maintenance advice. Please see specific information pulled into the AVS if appropriate.     PORSHA Shaw

## 2023-11-01 NOTE — TELEPHONE ENCOUNTER
Specialty Pharmacy Patient Management Program  Prescription Refill Request     Patient currently fills medications at  Pharmacy. Needing refill(s) on the following:      Requested Prescriptions     Pending Prescriptions Disp Refills    Accu-Chek Softclix Lancets lancets 300 each 4     Sig: Use to check blood sugar 3 times per day       Pended for PORSHA Shaw to review, and approve if appropriate.     Alannah Rosas PharmD, BCPS, BCCCP  11/1/2023 12:17 EDT

## 2023-11-10 ENCOUNTER — SPECIALTY PHARMACY (OUTPATIENT)
Dept: ENDOCRINOLOGY | Age: 43
End: 2023-11-10
Payer: COMMERCIAL

## 2023-11-10 DIAGNOSIS — E78.2 HYPERLIPEMIA, MIXED: ICD-10-CM

## 2023-11-10 RX ORDER — DAPAGLIFLOZIN AND METFORMIN HYDROCHLORIDE 5; 1000 MG/1; MG/1
1 TABLET, FILM COATED, EXTENDED RELEASE ORAL 2 TIMES DAILY
Qty: 180 TABLET | Refills: 0 | Status: SHIPPED | OUTPATIENT
Start: 2023-11-10

## 2023-11-10 RX ORDER — PROCHLORPERAZINE 25 MG/1
SUPPOSITORY RECTAL
Qty: 1 EACH | Refills: 1 | Status: SHIPPED | OUTPATIENT
Start: 2023-11-10

## 2023-11-10 RX ORDER — ROSUVASTATIN CALCIUM 40 MG/1
40 TABLET, COATED ORAL DAILY
Qty: 90 TABLET | Refills: 1 | Status: SHIPPED | OUTPATIENT
Start: 2023-11-10

## 2023-11-10 RX ORDER — FENOFIBRATE 145 MG/1
145 TABLET, COATED ORAL DAILY
Qty: 90 TABLET | Refills: 1 | Status: SHIPPED | OUTPATIENT
Start: 2023-11-10

## 2023-11-10 RX ORDER — ICOSAPENT ETHYL 1000 MG/1
2 CAPSULE ORAL 2 TIMES DAILY WITH MEALS
Qty: 360 CAPSULE | Refills: 1 | Status: SHIPPED | OUTPATIENT
Start: 2023-11-10

## 2023-11-10 RX ORDER — PROCHLORPERAZINE 25 MG/1
SUPPOSITORY RECTAL
Qty: 9 EACH | Refills: 1 | Status: SHIPPED | OUTPATIENT
Start: 2023-11-10 | End: 2024-02-08

## 2023-11-10 NOTE — PROGRESS NOTES
"Specialty Pharmacy Refill Coordination Note     Cora \"Cora Gutiérrez\" is a 43 y.o. female contacted today regarding refills of  5 specialty medication(s).    Reviewed and verified with patient:       Specialty medication(s) and dose(s) confirmed: yes    Refill Questions      Flowsheet Row Most Recent Value   Changes to allergies? No   Changes to medications? No   New conditions since last clinic visit No   Unplanned office visit, urgent care, ED, or hospital admission in the last 4 weeks  No   How does patient/caregiver feel medication is working? Good   Financial problems or insurance changes  No   Since the previous refill, were any specialty medication doses or scheduled injections missed or delayed?  No   Does this patient require a clinical escalation to a pharmacist? No            Delivery Questions      Flowsheet Row Most Recent Value   Delivery method Other (Comment)  [beeline]   Delivery address correct? Yes   Delivery phone number [phone]   Preferred delivery time? Anytime   Number of medications in delivery 5   Medication(s) being filled and delivered Continuous Blood Gluc Sensor, Icosapent Ethyl, Rosuvastatin Calcium, Fenofibrate (Fibric Acid Derivatives), Dapagliflozin Prop-Metformin   Doses left of specialty medications 1 week   Is there any medication that is due not being filled? No   Supplies needed? No supplies needed   Cooler needed? No   Do any medications need mixed or dated? No   Copay form of payment Credit card on file   Additional comments $45ish   Questions or concerns for the pharmacist? No   Are any medications first time fills? No   Shipment status Delivery complete              Medication Adherence          Adherence tools used: patient uses a pill box to manage medications      Support network for adherence: family member, healthcare provider                Follow-up: 25 day(s)     Debbie Washburn, Pharmacy Technician  Specialty Pharmacy Technician        "

## 2023-11-10 NOTE — TELEPHONE ENCOUNTER
Rx Refill Note  Requested Prescriptions     Pending Prescriptions Disp Refills    Continuous Blood Gluc Transmit (Dexcom G6 Transmitter) misc 1 each 1     Sig: USE AS DIRECTED AND REPLACE EVERY THREE MONTHS    Continuous Blood Gluc Sensor (Dexcom G6 Sensor) 9 each 1     Sig: Apply  topically Every 10 (Ten) Days for 90 days.    Vascepa 1 g capsule capsule 360 capsule 1     Sig: Take 2 capsules by mouth 2 (Two) Times a Day With Meals.    rosuvastatin (CRESTOR) 40 MG tablet 90 tablet 1     Sig: Take 1 tablet by mouth Daily.    fenofibrate (TRICOR) 145 MG tablet 90 tablet 1     Sig: Take 1 tablet by mouth Daily.    dapagliflozin-metformin HCl ER (Xigduo XR) 5-1000 MG tablet 180 tablet 1     Sig: Take 1 tablet by mouth 2 (Two) Times a Day.      Last office visit with prescribing clinician: 11/1/2023   Last telemedicine visit with prescribing clinician: Visit date not found   Next office visit with prescribing clinician: 3/4/2024                         Would you like a call back once the refill request has been completed: [] Yes [] No    If the office needs to give you a call back, can they leave a voicemail: [] Yes [] No    Monika Scott MA  11/10/23, 09:34 EST

## 2023-12-04 ENCOUNTER — PATIENT MESSAGE (OUTPATIENT)
Dept: ENDOCRINOLOGY | Age: 43
End: 2023-12-04
Payer: COMMERCIAL

## 2023-12-04 ENCOUNTER — SPECIALTY PHARMACY (OUTPATIENT)
Dept: ENDOCRINOLOGY | Age: 43
End: 2023-12-04
Payer: COMMERCIAL

## 2023-12-04 DIAGNOSIS — Z79.4 TYPE 2 DIABETES MELLITUS WITH HYPERGLYCEMIA, WITH LONG-TERM CURRENT USE OF INSULIN: Primary | ICD-10-CM

## 2023-12-04 DIAGNOSIS — E11.65 TYPE 2 DIABETES MELLITUS WITH HYPERGLYCEMIA, WITH LONG-TERM CURRENT USE OF INSULIN: Primary | ICD-10-CM

## 2023-12-04 NOTE — PROGRESS NOTES
"Specialty Pharmacy Refill Coordination Note     Cora \"Cora Gutiérrez\" is a 43 y.o. female contacted today regarding refills of  1 specialty medication(s).    Reviewed and verified with patient:       Specialty medication(s) and dose(s) confirmed: yes, Patient stated she was ok to change to the higher dose and was mainly concerned about the nausea but stated she could deal with  it.    Refill Questions      Flowsheet Row Most Recent Value   Changes to allergies? No   Changes to medications? No   New conditions since last clinic visit No   Unplanned office visit, urgent care, ED, or hospital admission in the last 4 weeks  No   How does patient/caregiver feel medication is working? Good   Financial problems or insurance changes  No   Since the previous refill, were any specialty medication doses or scheduled injections missed or delayed?  No   Does this patient require a clinical escalation to a pharmacist? No            Delivery Questions      Flowsheet Row Most Recent Value   Delivery method Other (Comment)  [beeline]   Delivery address correct? Yes   Delivery phone number [phone]   Preferred delivery time? Anytime   Number of medications in delivery 1   Medication(s) being filled and delivered Tirzepatide   Doses left of specialty medications 1 week   Is there any medication that is due not being filled? No   Supplies needed? No supplies needed   Cooler needed? Yes   Do any medications need mixed or dated? No   Copay form of payment Credit card on file   Additional comments $25   Questions or concerns for the pharmacist? No   Are any medications first time fills? No   Shipment status Delivery complete              Medication Adherence          Adherence tools used: patient uses a pill box to manage medications      Support network for adherence: family member, healthcare provider                Follow-up: 25 day(s)     Debbie Washburn, Pharmacy Technician  Specialty Pharmacy Technician        "

## 2023-12-04 NOTE — TELEPHONE ENCOUNTER
From: Cora Gutiérrez  To: Thais Wheatley  Sent: 12/4/2023 12:41 PM EST  Subject: MONJOURO    Julio Cesar Plascencia, I was wondering if I could get my second prescription of the Monjouro? But, can I stay on the 2.5 for another month?

## 2023-12-11 ENCOUNTER — SPECIALTY PHARMACY (OUTPATIENT)
Dept: ENDOCRINOLOGY | Age: 43
End: 2023-12-11
Payer: COMMERCIAL

## 2023-12-11 NOTE — PROGRESS NOTES
"Specialty Pharmacy Refill Coordination Note     Cora \"Cora Gutiérrez\" is a 43 y.o. female contacted today regarding refills of  7 specialty medication(s).    Reviewed and verified with patient:       Specialty medication(s) and dose(s) confirmed: yes    Refill Questions      Flowsheet Row Most Recent Value   Changes to allergies? No   Changes to medications? No   New conditions since last clinic visit No   Unplanned office visit, urgent care, ED, or hospital admission in the last 4 weeks  No   How does patient/caregiver feel medication is working? Good   Financial problems or insurance changes  No   Since the previous refill, were any specialty medication doses or scheduled injections missed or delayed?  No   Does this patient require a clinical escalation to a pharmacist? No            Delivery Questions      Flowsheet Row Most Recent Value   Delivery method Other (Comment)  [beeline]   Delivery address correct? Yes   Delivery phone number [phone]   Preferred delivery time? Anytime   Number of medications in delivery 7   Medication(s) being filled and delivered Continuous Blood Gluc Transmit, Insulin Aspart, Continuous Blood Gluc Sensor, Icosapent Ethyl, Rosuvastatin Calcium, Fenofibrate (Fibric Acid Derivatives), Dapagliflozin Prop-Metformin   Doses left of specialty medications 1 week   Is there any medication that is due not being filled? No   Supplies needed? No supplies needed   Cooler needed? Yes   Do any medications need mixed or dated? No   Copay form of payment Credit card on file   Additional comments $51.12   Questions or concerns for the pharmacist? No   Are any medications first time fills? No   Shipment status Delivery complete, Cooler packed              Medication Adherence          Adherence tools used: patient uses a pill box to manage medications      Support network for adherence: family member, healthcare provider                Follow-up: 25 day(s)     Debbie Washburn, Pharmacy " Technician  Specialty Pharmacy Technician

## 2023-12-24 DIAGNOSIS — E03.9 ACQUIRED HYPOTHYROIDISM: ICD-10-CM

## 2023-12-26 NOTE — TELEPHONE ENCOUNTER
Rx Refill Note  Requested Prescriptions     Pending Prescriptions Disp Refills    Synthroid 175 MCG tablet [Pharmacy Med Name: SYNTHROID    TAB 175MCG] 30 tablet 1     Sig: TAKE 1 TABLET DAILY IN THE MORNING AS DIRECTED ON EMPTY STOMACH WITH WATER ONLY FOR HYPOTHYROIDISM      Last office visit with prescribing clinician: 11/1/2023   Last telemedicine visit with prescribing clinician: Visit date not found   Next office visit with prescribing clinician: 3/4/2024                         Would you like a call back once the refill request has been completed: [] Yes [] No    If the office needs to give you a call back, can they leave a voicemail: [] Yes [] No    Bhavya Webb MA  12/26/23, 09:28 EST

## 2023-12-27 RX ORDER — LEVOTHYROXINE SODIUM 175 MCG
TABLET ORAL
Qty: 90 TABLET | Refills: 0 | Status: SHIPPED | OUTPATIENT
Start: 2023-12-27

## 2024-01-02 ENCOUNTER — SPECIALTY PHARMACY (OUTPATIENT)
Dept: ENDOCRINOLOGY | Age: 44
End: 2024-01-02
Payer: COMMERCIAL

## 2024-01-02 NOTE — TELEPHONE ENCOUNTER
Specialty Pharmacy Patient Management Program  Prescription Refill Request     Patient currently fills medications at  Pharmacy. Needing refill(s) on the following:      Requested Prescriptions     Pending Prescriptions Disp Refills    Tirzepatide (Mounjaro) 5 MG/0.5ML solution pen-injector pen 6 mL 0     Sig: Inject 0.5 mL under the skin into the appropriate area as directed 1 (One) Time Per Week.       Pended for PORSHA Shaw to review, and approve if appropriate.     Alannah Rosas, PharmD, BCPS, BCCCP  1/2/2024 11:32 EST

## 2024-01-02 NOTE — PROGRESS NOTES
"Specialty Pharmacy Refill Coordination Note     Cora \"Cora Gutiérrez\" is a 43 y.o. female contacted today regarding refills of  1 specialty medication(s).    Reviewed and verified with patient:       Specialty medication(s) and dose(s) confirmed: yes    Refill Questions      Flowsheet Row Most Recent Value   Changes to allergies? No   Changes to medications? No   New conditions or infections since last clinic visit No   Unplanned office visit, urgent care, ED, or hospital admission in the last 4 weeks  No   How does patient/caregiver feel medication is working? Good   Financial problems or insurance changes  No   Since the previous refill, were any specialty medication doses or scheduled injections missed or delayed?  No   Does this patient require a clinical escalation to a pharmacist? No            Delivery Questions      Flowsheet Row Most Recent Value   Delivery method Beeline   Delivery address verified with patient/caregiver? Yes   Delivery address Home   Number of medications in delivery 1   Medication(s) being filled and delivered Tirzepatide   Doses left of specialty medications 1 week   Copay verified? Yes   Copay amount $25   Copay form of payment Credit/debit on file              Medication Adherence          Adherence tools used: patient uses a pill box to manage medications      Support network for adherence: family member, healthcare provider                Follow-up: 25 day(s)     Debbie Washburn, Pharmacy Technician  Specialty Pharmacy Technician        "

## 2024-01-08 RX ORDER — INSULIN ASPART 100 [IU]/ML
100 INJECTION, SOLUTION INTRAVENOUS; SUBCUTANEOUS DAILY
Qty: 90 ML | Refills: 0 | Status: SHIPPED | OUTPATIENT
Start: 2024-01-08

## 2024-01-08 NOTE — TELEPHONE ENCOUNTER
Rx Refill Note  Requested Prescriptions     Pending Prescriptions Disp Refills    Insulin Aspart (NovoLOG) 100 UNIT/ML injection 30 mL 2     Sig: Use 100 units daily via Omnipod as directed.      Last office visit with prescribing clinician: 11/1/2023   Last telemedicine visit with prescribing clinician: Visit date not found   Next office visit with prescribing clinician: 3/4/2024                         Would you like a call back once the refill request has been completed: [] Yes [] No    If the office needs to give you a call back, can they leave a voicemail: [] Yes [] No    Ramona Webb  01/08/24, 10:31 EST

## 2024-01-10 ENCOUNTER — SPECIALTY PHARMACY (OUTPATIENT)
Dept: ENDOCRINOLOGY | Age: 44
End: 2024-01-10
Payer: COMMERCIAL

## 2024-01-10 ENCOUNTER — TELEMEDICINE (OUTPATIENT)
Dept: FAMILY MEDICINE CLINIC | Facility: CLINIC | Age: 44
End: 2024-01-10
Payer: COMMERCIAL

## 2024-01-10 DIAGNOSIS — E11.65 TYPE 2 DIABETES MELLITUS WITH HYPERGLYCEMIA, WITH LONG-TERM CURRENT USE OF INSULIN: ICD-10-CM

## 2024-01-10 DIAGNOSIS — U07.1 COVID-19: Primary | ICD-10-CM

## 2024-01-10 DIAGNOSIS — Z79.4 TYPE 2 DIABETES MELLITUS WITH HYPERGLYCEMIA, WITH LONG-TERM CURRENT USE OF INSULIN: ICD-10-CM

## 2024-01-10 PROCEDURE — 99213 OFFICE O/P EST LOW 20 MIN: CPT | Performed by: NURSE PRACTITIONER

## 2024-01-10 NOTE — LETTER
January 10, 2024     Patient: Cora Gutiérrez   YOB: 1980   Date of Visit: 1/10/2024       To Whom It May Concern:    It is my medical opinion that Cora Gutiérrez may return to work Tues 1/16/24.            Sincerely,        PORSHA Huang    CC:   No Recipients

## 2024-01-10 NOTE — PROGRESS NOTES
"Specialty Pharmacy Refill Coordination Note     Cora \"Cora Gutiérrez\" is a 43 y.o. female contacted today regarding refills of  5 specialty medication(s).    Reviewed and verified with patient:       Specialty medication(s) and dose(s) confirmed: yes    Refill Questions      Flowsheet Row Most Recent Value   Changes to allergies? No   Changes to medications? No   New conditions or infections since last clinic visit No   Unplanned office visit, urgent care, ED, or hospital admission in the last 4 weeks  No   How does patient/caregiver feel medication is working? Good   Financial problems or insurance changes  No   Since the previous refill, were any specialty medication doses or scheduled injections missed or delayed?  No   Does this patient require a clinical escalation to a pharmacist? No            Delivery Questions      Flowsheet Row Most Recent Value   Delivery method Beeline   Delivery address verified with patient/caregiver? Yes   Delivery address Home   Number of medications in delivery 5   Medication(s) being filled and delivered Rosuvastatin Calcium, Continuous Blood Gluc Sensor, Fenofibrate (Fibric Acid Derivatives), Dapagliflozin Prop-Metformin, Icosapent Ethyl   Doses left of specialty medications 1 week   Copay verified? Yes   Copay amount 39.79   Copay form of payment Credit/debit on file              Medication Adherence          Adherence tools used: patient uses a pill box to manage medications      Support network for adherence: family member, healthcare provider                Follow-up: 25 day(s)     Debbie Washburn, Pharmacy Technician  Specialty Pharmacy Technician        "

## 2024-01-10 NOTE — PROGRESS NOTES
Subjective   Cora Gutiérrez is a 43 y.o. female.   This was an audio and video enabled telemedicine encounter.   History of Present Illness   Estab pt     Video visit for covid. She has been sick since Christmas. Was told RSV from [facility], went back last Fri and she was dx pneumonia/pleurisy. Took Augmentin and steroid. Started feeling sick Monday and tested + covid last night. She stopped steroid due to blood sugar spikes. She has rescue inhaler but has not used it. Symptoms are fatigue, congestion, cough, denies SOA.     The following portions of the patient's history were reviewed and updated as appropriate: allergies, current medications, past family history, past medical history, past social history, past surgical history and problem list.    Review of Systems   Constitutional:  Positive for activity change and fatigue. Negative for fever.   HENT:  Positive for congestion. Negative for trouble swallowing.    Respiratory:  Positive for cough. Negative for shortness of breath and wheezing.    Cardiovascular:  Negative for chest pain.   Neurological:  Negative for headache.       Objective   Physical Exam  Constitutional:       Appearance: Normal appearance. She is ill-appearing.   HENT:      Mouth/Throat:      Mouth: Mucous membranes are moist.   Pulmonary:      Effort: Pulmonary effort is normal.   Skin:     General: Skin is dry.   Neurological:      General: No focal deficit present.      Mental Status: She is alert.         There were no vitals filed for this visit.  There is no height or weight on file to calculate BMI.    Procedures    Assessment & Plan   Problems Addressed this Visit       Type 2 diabetes mellitus     Other Visit Diagnoses       COVID-19    -  Primary          Diagnoses         Codes Comments    COVID-19    -  Primary ICD-10-CM: U07.1  ICD-9-CM: 079.89     Type 2 diabetes mellitus with hyperglycemia, with long-term current use of insulin     ICD-10-CM: E11.65, Z79.4  ICD-9-CM:  250.00, 790.29, V58.67           COVID-19-discussed antiviral with patient due to uncontrolled DM2.  Patient does not want to take antiviral at this time.  She feels that she can control her symptoms with over-the-counter medication and rest/supportive care.  Advised fluids, rest, Tylenol ibuprofen as needed or over-the-counter cold preparations.  Use rescue inhaler as needed.  Reviewed guidelines for 5 days of isolation, 5 days massed, work note sent  Call for concerns  ER for any acute changes or severe shortness of air.    [id number] with Endo, patient has stopped her steroid due to elevated blood sugar, follow ADA diet.         You have chosen to receive care through a telehealth visit. Do you consent to use a telephone visit for your medical care today? Yes    Education provided in AVS   Return if symptoms worsen or fail to improve.

## 2024-01-15 RX ORDER — NORGESTIMATE AND ETHINYL ESTRADIOL 7DAYSX3 28
1 KIT ORAL DAILY
Qty: 84 TABLET | Refills: 3 | OUTPATIENT
Start: 2024-01-15

## 2024-01-25 DIAGNOSIS — F32.A DEPRESSION, UNSPECIFIED DEPRESSION TYPE: ICD-10-CM

## 2024-01-25 RX ORDER — ESCITALOPRAM OXALATE 10 MG/1
10 TABLET ORAL DAILY
Qty: 90 TABLET | Refills: 3 | Status: SHIPPED | OUTPATIENT
Start: 2024-01-25

## 2024-01-29 ENCOUNTER — SPECIALTY PHARMACY (OUTPATIENT)
Dept: ENDOCRINOLOGY | Age: 44
End: 2024-01-29
Payer: COMMERCIAL

## 2024-01-29 NOTE — PROGRESS NOTES
"Specialty Pharmacy Refill Coordination Note     Cora \"Cora Gutiérrez\" is a 43 y.o. female contacted today regarding refills of  1 specialty medication(s).    Reviewed and verified with patient:       Specialty medication(s) and dose(s) confirmed: yes    Refill Questions      Flowsheet Row Most Recent Value   Changes to allergies? No   Changes to medications? No   New conditions or infections since last clinic visit No   Unplanned office visit, urgent care, ED, or hospital admission in the last 4 weeks  No   How does patient/caregiver feel medication is working? Good   Financial problems or insurance changes  No   Since the previous refill, were any specialty medication doses or scheduled injections missed or delayed?  No   Does this patient require a clinical escalation to a pharmacist? No            Delivery Questions      Flowsheet Row Most Recent Value   Delivery method Beeline   Delivery address verified with patient/caregiver? Yes   Delivery address Home   Number of medications in delivery 1   Medication(s) being filled and delivered Tirzepatide   Doses left of specialty medications 1 week   Copay verified? Yes   Copay amount $25   Copay form of payment HAS/FSA on file              Medication Adherence          Adherence tools used: patient uses a pill box to manage medications      Support network for adherence: family member, healthcare provider                Follow-up: 25 day(s)     Debbie Washburn, Pharmacy Technician  Specialty Pharmacy Technician        "

## 2024-02-02 RX ORDER — DAPAGLIFLOZIN AND METFORMIN HYDROCHLORIDE 5; 1000 MG/1; MG/1
1 TABLET, FILM COATED, EXTENDED RELEASE ORAL 2 TIMES DAILY
Qty: 180 TABLET | Refills: 0 | Status: SHIPPED | OUTPATIENT
Start: 2024-02-02

## 2024-02-02 NOTE — TELEPHONE ENCOUNTER
Rx Refill Note  Requested Prescriptions     Pending Prescriptions Disp Refills    dapagliflozin-metformin HCl ER (Xigduo XR) 5-1000 MG tablet 180 tablet 0     Sig: Take 1 tablet by mouth 2 (Two) Times a Day.      Last office visit with prescribing clinician: 11/1/2023   Last telemedicine visit with prescribing clinician: Visit date not found   Next office visit with prescribing clinician: 3/4/2024                         Would you like a call back once the refill request has been completed: [] Yes [] No    If the office needs to give you a call back, can they leave a voicemail: [] Yes [] No    Bhavya Webb MA  02/02/24, 09:54 EST

## 2024-02-06 ENCOUNTER — SPECIALTY PHARMACY (OUTPATIENT)
Dept: ENDOCRINOLOGY | Age: 44
End: 2024-02-06
Payer: COMMERCIAL

## 2024-02-06 NOTE — PROGRESS NOTES
"Specialty Pharmacy Refill Coordination Note     Cora \"Cora Gutiérrez\" is a 43 y.o. female contacted today regarding refills of  6 specialty medication(s).    Reviewed and verified with patient:       Specialty medication(s) and dose(s) confirmed: yes    Refill Questions      Flowsheet Row Most Recent Value   Changes to allergies? No   Changes to medications? No   New conditions or infections since last clinic visit No   Unplanned office visit, urgent care, ED, or hospital admission in the last 4 weeks  No   How does patient/caregiver feel medication is working? Good   Financial problems or insurance changes  No   Since the previous refill, were any specialty medication doses or scheduled injections missed or delayed?  No   Does this patient require a clinical escalation to a pharmacist? No            Delivery Questions      Flowsheet Row Most Recent Value   Delivery method Beeline   Delivery address verified with patient/caregiver? Yes   Delivery address Home   Number of medications in delivery 6   Medication(s) being filled and delivered Rosuvastatin Calcium, Icosapent Ethyl, Continuous Blood Gluc Sensor, Fenofibrate (Fibric Acid Derivatives), Dapagliflozin Prop-Metformin, Tirzepatide, Insulin Aspart   Doses left of specialty medications 1 week   Copay verified? Yes   Copay amount $64.79   Copay form of payment Credit/debit on file              Medication Adherence          Adherence tools used: patient uses a pill box to manage medications      Support network for adherence: family member, healthcare provider                Follow-up: 25 day(s)     Debbie Washburn, Pharmacy Technician  Specialty Pharmacy Technician        "

## 2024-02-08 RX ORDER — NORGESTIMATE AND ETHINYL ESTRADIOL 7DAYSX3 28
1 KIT ORAL DAILY
Qty: 84 TABLET | Refills: 3 | Status: SHIPPED | OUTPATIENT
Start: 2024-02-08

## 2024-02-08 RX ORDER — INSULIN PMP CART,AUT,G6/7,CNTR
EACH SUBCUTANEOUS
Qty: 45 EACH | Refills: 0 | Status: SHIPPED | OUTPATIENT
Start: 2024-02-08

## 2024-02-08 NOTE — TELEPHONE ENCOUNTER
LAST REFILL - 02/01/23  LAST VISIT - 01/10/24 telemedicine, 11/30/22 in office  NEXT VISIT - not scheduled

## 2024-02-08 NOTE — TELEPHONE ENCOUNTER
Rx Refill Note  Requested Prescriptions     Pending Prescriptions Disp Refills    Insulin Disposable Pump (Omnipod 5 G6 Pod, Gen 5,) misc [Pharmacy Med Name: OMNIPOD 5 G6 PODS (GEN 5) 5PK] 45 each 1     Sig: ONE UNDER THE SKIN EVERY OTHER DAY      Last office visit with prescribing clinician: 11/1/2023   Last telemedicine visit with prescribing clinician: Visit date not found   Next office visit with prescribing clinician: 3/4/2024                         Would you like a call back once the refill request has been completed: [] Yes [] No    If the office needs to give you a call back, can they leave a voicemail: [] Yes [] No    Ramona Webb  02/08/24, 07:43 EST

## 2024-02-26 ENCOUNTER — TELEPHONE (OUTPATIENT)
Dept: ENDOCRINOLOGY | Age: 44
End: 2024-02-26
Payer: COMMERCIAL

## 2024-02-26 NOTE — TELEPHONE ENCOUNTER
"Hub staff attempted to follow warm transfer process and was unsuccessful     Caller: Cora Gutiérrez \"Cora Gutiérrez\"    Relationship to patient: Self    Best call back number: [phone]    Patient is needing: PT CALLED IN TO SCHEDULE HER LABS BEFORE HER APPT ON 4/9/2024. PLEASE CALL THE PT BACK.         "

## 2024-02-27 ENCOUNTER — SPECIALTY PHARMACY (OUTPATIENT)
Dept: ENDOCRINOLOGY | Age: 44
End: 2024-02-27
Payer: COMMERCIAL

## 2024-02-27 NOTE — PROGRESS NOTES
Specialty Pharmacy Patient Management Program  Endocrinology Reassessment     Cora Gutiérrez was referred by an Endocrinology provider to the Endocrinology Patient Management program offered by [facility] for Type 2 Diabetes. A follow-up outreach was conducted, including assessment of continued therapy appropriateness, medication adherence, and side effect incidence and management for Mounjaro, Vascepa, and Xigduo.    Changes to Insurance Coverage or Financial Support  [employer] and copay cards    Relevant Past Medical History and Comorbidities  Relevant medical history and concomitant health conditions were discussed with the patient. The patient's chart has been reviewed for relevant past medical history and comorbid health conditions and updated as necessary.   Past Medical History:   Diagnosis Date    BMI 40.0-44.9, adult     Diabetes mellitus     Fatty liver     H/O Pancreatitis, acute     Hyperlipidemia     Hypothyroidism     Left ovarian cyst     Type 2 diabetes mellitus      Social History     Socioeconomic History    Marital status:     Number of children: 0   Tobacco Use    Smoking status: Smoker, Current Status Unknown     Packs/day: .5     Types: Electronic Cigarette, Cigarettes    Smokeless tobacco: Never    Tobacco comments:     quit cigarrettes 4/01/19, now ecig    Vaping Use    Vaping Use: Every day   Substance and Sexual Activity    Alcohol use: Yes     Comment: 1 every 3 months     Drug use: No    Sexual activity: Yes     Partners: Male     Birth control/protection: OCP     Problem list reviewed by Ying Syed RP on 2/27/2024 at  9:33 AM    Hospitalizations and Urgent Care Since Last Assessment  ED Visits, Admissions, or Hospitalizations: None reported or disclosed  Urgent Office Visits: None reported or disclosed     Allergies  Known allergies and reactions were discussed with the patient. The patient's chart has been reviewed for allergy information and  updated as necessary.   Allergies   Allergen Reactions    Trulicity [Dulaglutide] GI Intolerance     Also h/o pancreatitis; avoid GLP-1 analogs     Allergies reviewed by Ying Syed RPH on 2/27/2024 at  9:20 AM    Relevant Laboratory Values  Relevant laboratory values were discussed with the patient. The following specialty medication dose adjustment(s) are recommended: No adjustments needed at this time.  A1C Last 3 Results          4/4/2023    09:46 8/17/2023    09:51 11/1/2023    09:26   HGBA1C Last 3 Results   Hemoglobin A1C 8.30  8.30  8.40      Lab Results   Component Value Date    HGBA1C 8.40 (H) 11/01/2023     Lab Results   Component Value Date    GLUCOSE 121 (H) 08/17/2023    CALCIUM 9.5 08/17/2023     08/17/2023    K 4.3 08/17/2023    CO2 22.8 08/17/2023     08/17/2023    BUN 14 08/17/2023    CREATININE 0.72 08/17/2023    EGFRIFAFRI 99 01/19/2022    EGFRIFNONA 86 01/19/2022    BCR 19.4 08/17/2023    ANIONGAP 11 11/07/2021     Lab Results   Component Value Date    CHLPL 173 08/17/2023    TRIG 369 (H) 08/17/2023    HDL 37 (L) 08/17/2023    LDL 77 08/17/2023     Microalbumin          8/17/2023    09:51   Microalbumin   Microalbumin, Urine 4.3      Current Medication List  This medication list has been reviewed with the patient and evaluated for any interactions or necessary modifications/recommendations, and updated to include all prescription medications, OTC medications, and supplements the patient is currently taking.  This list reflects what is contained in the patient's profile, which has also been marked as reviewed to communicate to other providers it is the most up to date version of the patient's current medication therapy.     Current Outpatient Medications:     Accu-Chek Softclix Lancets lancets, Use to check blood sugar 3 times per day, Disp: 300 each, Rfl: 4    Blood Glucose Monitoring Suppl (Accu-Chek Kristina Plus) w/Device kit, Use to check blood sugar 3 times a day., Disp: 1 kit,  Rfl: 0    Continuous Blood Gluc Sensor (Dexcom G6 Sensor), Apply  topically Every 10 (Ten) Days for 90 days., Disp: 9 each, Rfl: 1    Continuous Blood Gluc Transmit (Dexcom G6 Transmitter) misc, Use as Directed and Replace Transmitter Every 3 Months, Disp: 1 each, Rfl: 1    dapagliflozin-metformin HCl ER (Xigduo XR) 5-1000 MG tablet, Take 1 tablet by mouth 2 (Two) Times a Day., Disp: 180 tablet, Rfl: 0    escitalopram (LEXAPRO) 10 MG tablet, TAKE ONE TABLET BY MOUTH DAILY, Disp: 90 tablet, Rfl: 3    fenofibrate (TRICOR) 145 MG tablet, Take 1 tablet by mouth Daily., Disp: 90 tablet, Rfl: 1    Glucagon (Baqsimi Two Pack) 3 MG/DOSE powder, 3 mg into the nostril(s) as directed by provider As Needed (hypoglycemia on pump)., Disp: 2 each, Rfl: 1    glucose blood (Accu-Chek SmartView) test strip, Check 4 times daily Use as instructed, Disp: 100 each, Rfl: 12    glucose blood test strip, Check Blood Sugar 3 (Three) Times a Day., Disp: 300 each, Rfl: 4    insulin aspart (NovoLOG FlexPen) 100 UNIT/ML solution pen-injector sc pen, Inject 120 Units under the skin into the appropriate area as directed Daily. Up to 120u daily with pump, Disp: 45 mL, Rfl: 0    Insulin Aspart (NovoLOG) 100 UNIT/ML injection, Use 100 units daily via Omnipod as directed., Disp: 90 mL, Rfl: 0    Insulin Disposable Pump (Omnipod 5 G6 Pod, Gen 5,) misc, ONE UNDER THE SKIN EVERY OTHER DAY, Disp: 45 each, Rfl: 0    Insulin Pen Needle (BD Pen Needle Gina U/F) 32G X 4 MM misc, USE FIVE TIMES DAILY WITH INSULIN, Disp: 500 each, Rfl: 0    lidocaine (LIDODERM) 5 %, Place 1 patch on the skin as directed by provider Daily As Needed (back pain)., Disp: , Rfl:     norgestimate-ethinyl estradiol (Tri-Sprintec) 0.18/0.215/0.25 MG-35 MCG per tablet, TAKE ONE TABLET BY MOUTH DAILY, Disp: 84 tablet, Rfl: 3    rosuvastatin (CRESTOR) 40 MG tablet, Take 1 tablet by mouth Daily., Disp: 90 tablet, Rfl: 1    Synthroid 175 MCG tablet, TAKE 1 TABLET DAILY IN THE MORNING AS  DIRECTED ON EMPTY STOMACH WITH WATER ONLY FOR HYPOTHYROIDISM, Disp: 90 tablet, Rfl: 0    Tirzepatide (Mounjaro) 5 MG/0.5ML solution pen-injector pen, Inject 0.5 mL under the skin into the appropriate area as directed 1 (One) Time Per Week., Disp: 6 mL, Rfl: 0    Vascepa 1 g capsule capsule, Take 2 capsules by mouth 2 (Two) Times a Day With Meals., Disp: 360 capsule, Rfl: 1    Medicines reviewed by Ying Syed [facility] on 2/27/2024 at  9:22 AM    Drug Interactions  Mounajo, Novolog and Xigduo XR  Monitor patients for hypoglycemia.  Mounjaro and Tri-Sprintec   Patients using oral hormonal contraceptives should switch to a non-oral contraceptive method, or add a barrier method of contraception, for 4 weeks after initiation of tirzepatide and for 4 weeks after each dose escalation of tirzepatide.    Recommended Medications Assessment  Aspirin: Not Taking Currently  Statin: Currently Taking   ACEi/ARB: Not Taking Currently    Adverse Drug Reactions  Medication tolerability: Tolerating with no to minimal ADRs  Medication plan: Continue therapy with normal follow-up  Plan for ADR Management: No ADRs reported     Adherence, Self-Administration, and Current Therapy Problems  Adherence related to the patient's specialty therapy was discussed with the patient. The Adherence segment of this outreach has been reviewed and updated.     Adherence Questions  Linked Medication(s) Assessed: Continuous Blood Gluc Sensor, Continuous Blood Gluc Transmit, Dapagliflozin Prop-Metformin, Icosapent Ethyl  On average, how many doses/injections does the patient miss per month?: 0  What are the identified reasons for non-adherence or missed doses? : no problems identified  What is the estimated medication adherence level?: % (Per patient and data on proportion of days covered)  Based on the patient/caregiver response and refill history, does this patient require an MTP to track adherence improvements?: no    Additional Barriers to Patient  Self-Administration: None reported or disclosed   Methods for Supporting Patient Self-Administration: Continue to follow with fills and clinical assessments     Open Medication Therapy Problems  No medication therapy recommendations to display    Goals of Therapy  Goals related to the patient's specialty therapy were discussed with the patient. The Patient Goals segment of this outreach has been reviewed and updated.   Goals Addressed Today        Specialty Pharmacy General Goal      Reduce triglycerides < 150 mg/dL    Lab Results   Component Value Date    TRIG 369 (H) 08/17/2023    TRIG 265 (H) 11/30/2022    TRIG 340 (H) 04/25/2022    TRIG 233 (H) 01/19/2022    TRIG 478 (H) 11/06/2021       **Improved from > 2000 mg/dL in Nov. 2021 during acute pancreatitis admission       Specialty Pharmacy General Goal      Reduce HbA1c < 7.5%    Lab Results   Component Value Date    HGBA1C 8.40 (H) 11/01/2023    HGBA1C 8.30 (H) 08/17/2023    HGBA1C 8.30 (H) 04/04/2023    HGBA1C 7.90 (H) 11/30/2022    HGBA1C 8.6 (H) 04/25/2022                 Quality of Life Assessment   Quality of Life related to the patient's enrollment in the patient management program and services provided was discussed with the patient. The QOL segment of this outreach has been reviewed and updated.  Quality of Life Improvement Scale: 6-A little better    Reassessment Plan & Follow-Up  1. Medication Therapy Changes: No changes  2. Related Plans, Therapy Recommendations, or Issues to Be Addressed: Will discuss increasing Mounjaro when available   3. Pharmacist to perform regular assessments no more than (6) months from the previous assessment.  4. Care Coordinator to set up future refill outreaches, coordinate prescription delivery, and escalate clinical questions to pharmacist.    Attestation  Therapeutic appropriateness: Appropriate   I attest the patient was actively involved in and has agreed to the above plan of care.  If the prescribed therapy is at any  "point deemed not appropriate based on the current or future assessments, a consultation will be initiated with the patient's specialty care provider to determine the best course of action. The revised plan of therapy will be documented along with any required assessments and/or additional patient education provided.     Donna Syed PharmD, DIDIER  Clinical Specialty Pharmacist, Endocrinology  2/27/2024  09:36 EST        Specialty Pharmacy Patient Management Program  Endocrinology Refill Outreach      Cora \"Cora Gutiérrez\" is a 44 y.o. female contacted today regarding refills of her medication(s).    Specialty medication(s) and dose(s) confirmed: Mounjaro 5 mg (2 mL/28 days)  Other medication(s) being refilled: N/A    Refill Questions      Flowsheet Row Most Recent Value   Changes to allergies? No   Changes to medications? No   New conditions or infections since last clinic visit No   Unplanned office visit, urgent care, ED, or hospital admission in the last 4 weeks  No   How does patient/caregiver feel medication is working? Good   Financial problems or insurance changes  No   Since the previous refill, were any specialty medication doses or scheduled injections missed or delayed?  No   Does this patient require a clinical escalation to a pharmacist? No          Delivery Questions      Flowsheet Row Most Recent Value   Delivery method Beeline   Delivery address verified with patient/caregiver? Yes   Delivery address Home   Number of medications in delivery 1   Medication(s) being filled and delivered Tirzepatide   Doses left of specialty medications About a week   Copay verified? Yes   Copay amount $25   Copay form of payment HAS/FSA on file            Follow-Up: About a month for future fills    Donna Syed PharmD, DIDIER   Clinical Speciality Pharmacist, Endocrinology   2/27/2024  09:45 EST    "

## 2024-03-06 ENCOUNTER — SPECIALTY PHARMACY (OUTPATIENT)
Dept: ENDOCRINOLOGY | Age: 44
End: 2024-03-06
Payer: COMMERCIAL

## 2024-03-06 NOTE — PROGRESS NOTES
"Specialty Pharmacy Refill Coordination Note     Cora \"Cora Gutiérrez\" is a 44 y.o. female contacted today regarding refills of  5 specialty medication(s).    Reviewed and verified with patient:       Specialty medication(s) and dose(s) confirmed: yes    Refill Questions      Flowsheet Row Most Recent Value   Changes to allergies? No   Changes to medications? No   New conditions or infections since last clinic visit No   Unplanned office visit, urgent care, ED, or hospital admission in the last 4 weeks  No   How does patient/caregiver feel medication is working? Good   Financial problems or insurance changes  No   Since the previous refill, were any specialty medication doses or scheduled injections missed or delayed?  No   Does this patient require a clinical escalation to a pharmacist? No            Delivery Questions      Flowsheet Row Most Recent Value   Delivery method Beeline   Delivery address verified with patient/caregiver? Yes   Delivery address Home   Number of medications in delivery 5   Medication(s) being filled and delivered Continuous Blood Gluc Transmit, Continuous Blood Gluc Sensor, Fenofibrate (Fibric Acid Derivatives), Insulin Aspart, Rosuvastatin Calcium   Doses left of specialty medications 1 week   Copay verified? Yes   Copay amount 47.73   Copay form of payment HAS/FSA on file              Medication Adherence    Adherence tools used: patient uses a pill box to manage medications  Support network for adherence: family member, healthcare provider          Follow-up: 25 day(s)     Debbie Washburn, Pharmacy Technician  Specialty Pharmacy Technician        "

## 2024-03-21 ENCOUNTER — SPECIALTY PHARMACY (OUTPATIENT)
Dept: ENDOCRINOLOGY | Age: 44
End: 2024-03-21
Payer: COMMERCIAL

## 2024-03-21 NOTE — PROGRESS NOTES
"Specialty Pharmacy Refill Coordination Note     Cora \"Cora Gutiérrez\" is a 44 y.o. female contacted today regarding refills of  2 specialty medication(s).    Reviewed and verified with patient:       Specialty medication(s) and dose(s) confirmed: yes    Refill Questions      Flowsheet Row Most Recent Value   Changes to allergies? No   Changes to medications? No   New conditions or infections since last clinic visit No   Unplanned office visit, urgent care, ED, or hospital admission in the last 4 weeks  No   How does patient/caregiver feel medication is working? Good   Financial problems or insurance changes  No   Since the previous refill, were any specialty medication doses or scheduled injections missed or delayed?  No   Does this patient require a clinical escalation to a pharmacist? No            Delivery Questions      Flowsheet Row Most Recent Value   Delivery method Beeline   Delivery address verified with patient/caregiver? Yes   Delivery address Home   Number of medications in delivery 2   Medication(s) being filled and delivered Dapagliflozin Prop-Metformin, Tirzepatide   Doses left of specialty medications 1 week   Copay verified? Yes   Copay amount $25   Copay form of payment Credit/debit on file              Medication Adherence    Adherence tools used: patient uses a pill box to manage medications  Support network for adherence: family member, healthcare provider          Follow-up: 25 day(s)     Debbie Washburn, Pharmacy Technician  Specialty Pharmacy Technician        "

## 2024-03-21 NOTE — TELEPHONE ENCOUNTER
Rx Refill Note  Requested Prescriptions     Pending Prescriptions Disp Refills    Tirzepatide (Mounjaro) 5 MG/0.5ML solution pen-injector pen 2 mL 0     Sig: Inject 0.5 mL under the skin into the appropriate area as directed 1 (One) Time Per Week.      Last office visit with prescribing clinician: 11/1/2023   Last telemedicine visit with prescribing clinician: Visit date not found   Next office visit with prescribing clinician: 4/9/2024                         Would you like a call back once the refill request has been completed: [] Yes [] No    If the office needs to give you a call back, can they leave a voicemail: [] Yes [] No    Bhavya Webb MA  03/21/24, 09:45 EDT

## 2024-03-26 ENCOUNTER — TELEPHONE (OUTPATIENT)
Dept: ENDOCRINOLOGY | Age: 44
End: 2024-03-26
Payer: COMMERCIAL

## 2024-03-26 DIAGNOSIS — E03.9 ACQUIRED HYPOTHYROIDISM: ICD-10-CM

## 2024-03-26 DIAGNOSIS — Z79.4 TYPE 2 DIABETES MELLITUS WITH HYPERGLYCEMIA, WITH LONG-TERM CURRENT USE OF INSULIN: Primary | ICD-10-CM

## 2024-03-26 DIAGNOSIS — E11.65 TYPE 2 DIABETES MELLITUS WITH HYPERGLYCEMIA, WITH LONG-TERM CURRENT USE OF INSULIN: Primary | ICD-10-CM

## 2024-04-04 DIAGNOSIS — E03.9 ACQUIRED HYPOTHYROIDISM: ICD-10-CM

## 2024-04-04 DIAGNOSIS — Z79.4 TYPE 2 DIABETES MELLITUS WITH HYPERGLYCEMIA, WITH LONG-TERM CURRENT USE OF INSULIN: ICD-10-CM

## 2024-04-04 DIAGNOSIS — E11.65 TYPE 2 DIABETES MELLITUS WITH HYPERGLYCEMIA, WITH LONG-TERM CURRENT USE OF INSULIN: ICD-10-CM

## 2024-04-05 ENCOUNTER — SPECIALTY PHARMACY (OUTPATIENT)
Dept: ENDOCRINOLOGY | Age: 44
End: 2024-04-05
Payer: COMMERCIAL

## 2024-04-05 LAB
25(OH)D3+25(OH)D2 SERPL-MCNC: 15.9 NG/ML (ref 30–100)
ALBUMIN SERPL-MCNC: 4.1 G/DL (ref 3.5–5.2)
ALBUMIN/GLOB SERPL: 1.3 G/DL
ALP SERPL-CCNC: 52 U/L (ref 39–117)
ALT SERPL-CCNC: 35 U/L (ref 1–33)
AST SERPL-CCNC: 32 U/L (ref 1–32)
BILIRUB SERPL-MCNC: 0.3 MG/DL (ref 0–1.2)
BUN SERPL-MCNC: 13 MG/DL (ref 6–20)
BUN/CREAT SERPL: 16 (ref 7–25)
CALCIUM SERPL-MCNC: 9.7 MG/DL (ref 8.6–10.5)
CHLORIDE SERPL-SCNC: 102 MMOL/L (ref 98–107)
CHOLEST SERPL-MCNC: 462 MG/DL (ref 0–200)
CO2 SERPL-SCNC: 20.5 MMOL/L (ref 22–29)
CREAT SERPL-MCNC: 0.81 MG/DL (ref 0.57–1)
EGFRCR SERPLBLD CKD-EPI 2021: 92.5 ML/MIN/1.73
FOLATE SERPL-MCNC: 12.3 NG/ML (ref 4.78–24.2)
GLOBULIN SER CALC-MCNC: 3.1 GM/DL
GLUCOSE SERPL-MCNC: 120 MG/DL (ref 65–99)
HBA1C MFR BLD: 7.6 % (ref 4.8–5.6)
HDLC SERPL-MCNC: 25 MG/DL (ref 40–60)
IMP & REVIEW OF LAB RESULTS: NORMAL
LDLC SERPL CALC-MCNC: ABNORMAL MG/DL
POTASSIUM SERPL-SCNC: 4.1 MMOL/L (ref 3.5–5.2)
PROT SERPL-MCNC: 7.2 G/DL (ref 6–8.5)
SODIUM SERPL-SCNC: 139 MMOL/L (ref 136–145)
T4 FREE SERPL-MCNC: 0.85 NG/DL (ref 0.93–1.7)
TRIGL SERPL-MCNC: 1751 MG/DL (ref 0–150)
TSH SERPL DL<=0.005 MIU/L-ACNC: 24.8 UIU/ML (ref 0.27–4.2)
VIT B12 SERPL-MCNC: 354 PG/ML (ref 211–946)
VLDLC SERPL CALC-MCNC: ABNORMAL MG/DL

## 2024-04-05 NOTE — PROGRESS NOTES
"Specialty Pharmacy Refill Coordination Note     Cora \"Cora Gutiérrez\" is a 44 y.o. female contacted today regarding refills of  4 specialty medication(s).    Reviewed and verified with patient:       Specialty medication(s) and dose(s) confirmed: yes    Refill Questions      Flowsheet Row Most Recent Value   Changes to allergies? No   Changes to medications? No   New conditions or infections since last clinic visit No   Unplanned office visit, urgent care, ED, or hospital admission in the last 4 weeks  No   How does patient/caregiver feel medication is working? Good   Financial problems or insurance changes  No   Since the previous refill, were any specialty medication doses or scheduled injections missed or delayed?  No   Does this patient require a clinical escalation to a pharmacist? No            Delivery Questions      Flowsheet Row Most Recent Value   Delivery method Beeline   Delivery address verified with patient/caregiver? Yes   Delivery address Home   Number of medications in delivery 4   Medication(s) being filled and delivered Rosuvastatin Calcium, Fenofibrate (Fibric Acid Derivatives), Continuous Blood Gluc Sensor, Insulin Aspart   Doses left of specialty medications 1 week   Copay verified? Yes   Copay amount 49.34   Copay form of payment Credit/debit on file              Medication Adherence    Adherence tools used: patient uses a pill box to manage medications  Support network for adherence: family member, healthcare provider          Follow-up: 25 day(s)     Debbie Washburn, Pharmacy Technician  Specialty Pharmacy Technician        "

## 2024-04-09 ENCOUNTER — OFFICE VISIT (OUTPATIENT)
Dept: ENDOCRINOLOGY | Age: 44
End: 2024-04-09
Payer: COMMERCIAL

## 2024-04-09 VITALS
SYSTOLIC BLOOD PRESSURE: 132 MMHG | HEART RATE: 96 BPM | WEIGHT: 219.6 LBS | DIASTOLIC BLOOD PRESSURE: 76 MMHG | BODY MASS INDEX: 38.91 KG/M2 | TEMPERATURE: 96.9 F | HEIGHT: 63 IN | OXYGEN SATURATION: 99 %

## 2024-04-09 DIAGNOSIS — Z79.4 TYPE 2 DIABETES MELLITUS WITH HYPERGLYCEMIA, WITH LONG-TERM CURRENT USE OF INSULIN: Primary | ICD-10-CM

## 2024-04-09 DIAGNOSIS — E03.9 ACQUIRED HYPOTHYROIDISM: ICD-10-CM

## 2024-04-09 DIAGNOSIS — E11.65 TYPE 2 DIABETES MELLITUS WITH HYPERGLYCEMIA, WITH LONG-TERM CURRENT USE OF INSULIN: Primary | ICD-10-CM

## 2024-04-09 DIAGNOSIS — E78.2 HYPERLIPEMIA, MIXED: ICD-10-CM

## 2024-04-09 DIAGNOSIS — E66.01 CLASS 2 SEVERE OBESITY DUE TO EXCESS CALORIES WITH SERIOUS COMORBIDITY AND BODY MASS INDEX (BMI) OF 38.0 TO 38.9 IN ADULT: ICD-10-CM

## 2024-04-09 PROCEDURE — 95251 CONT GLUC MNTR ANALYSIS I&R: CPT | Performed by: NURSE PRACTITIONER

## 2024-04-09 PROCEDURE — 99214 OFFICE O/P EST MOD 30 MIN: CPT | Performed by: NURSE PRACTITIONER

## 2024-04-09 RX ORDER — GLUCAGON 3 MG/1
3 POWDER NASAL AS NEEDED
Qty: 2 EACH | Refills: 1 | Status: SHIPPED | OUTPATIENT
Start: 2024-04-09

## 2024-04-09 NOTE — PROGRESS NOTES
"Chief Complaint  Diabetes (Type 2: Pt Dexcom is attached. )    Subjective        Cora Gutiérrez presents to [facility]  History of Present Illness    Type 2 dm diagnosed April 2015, insulin started at that time   Dm regimen: omnipod 5 with novolog, xigduo 5-1000 BID, mounjaro 5mg weekly  Backup plan: insulin pens  Glucagon: baqsimi  HLP, mixed: statin and vascepa with fenofibrate      Cgm review  70% time in range  0% low  30% high  Gmi 7.3%  Average glucose 165  100% automated mode  84% basal  16% bolus   TDD 29u    Hypothyroid  Synthroid 175mcg daily- missed several doses leading up to lab work     Objective   Vital Signs:  /76   Pulse 96   Temp 96.9 °F (36.1 °C) (Temporal)   Ht 160 cm (62.99\")   Wt 99.6 kg (219 lb 9.6 oz)   SpO2 99%   BMI 38.91 kg/m²   Estimated body mass index is 38.91 kg/m² as calculated from the following:    Height as of this encounter: 160 cm (62.99\").    Weight as of this encounter: 99.6 kg (219 lb 9.6 oz).             Physical Exam  Vitals reviewed.   Constitutional:       General: She is not in acute distress.  HENT:      Head: Normocephalic and atraumatic.   Cardiovascular:      Rate and Rhythm: Normal rate.   Pulmonary:      Effort: Pulmonary effort is normal. No respiratory distress.   Musculoskeletal:         General: No signs of injury. Normal range of motion.      Cervical back: Normal range of motion and neck supple.   Skin:     General: Skin is warm and dry.   Neurological:      Mental Status: She is alert and oriented to person, place, and time. Mental status is at baseline.   Psychiatric:         Mood and Affect: Mood normal.         Behavior: Behavior normal.         Thought Content: Thought content normal.         Judgment: Judgment normal.          Result Review :    The following data was reviewed by: PORSHA Shaw on 04/09/2024:  Common labs          8/17/2023    09:51 11/1/2023    09:26 4/4/2024    08:36   Common Labs "   Glucose 121   120    BUN 14   13    Creatinine 0.72   0.81    Sodium 137   139    Potassium 4.3   4.1    Chloride 101   102    Calcium 9.5   9.7    Total Protein 6.8   7.2    Albumin 4.1   4.1    Total Bilirubin <0.2   0.3    Alkaline Phosphatase 53   52    AST (SGOT) 21   32    ALT (SGPT) 18   35    Total Cholesterol 173   462    Triglycerides 369   1,751    HDL Cholesterol 37   25    LDL Cholesterol  77   Comment    Hemoglobin A1C 8.30  8.40  7.60    Microalbumin, Urine 4.3                     Assessment and Plan     Diagnoses and all orders for this visit:    1. Type 2 diabetes mellitus with hyperglycemia, with long-term current use of insulin (Primary)  -     TSH; Future  -     T4, Free; Future  -     T3, Free; Future  -     Comprehensive Metabolic Panel; Future  -     Lipid Panel; Future    2. Acquired hypothyroidism    3. Hyperlipemia, mixed    4. Class 2 severe obesity due to excess calories with serious comorbidity and body mass index (BMI) of 38.0 to 38.9 in adult    Other orders  -     Glucagon (Baqsimi Two Pack) 3 MG/DOSE powder; Spray 3 mg into the nostril(s) as directed by provider As Needed for hypoglycemia on pump  Dispense: 2 each; Refill: 1             Follow Up     Return in about 4 months (around 8/9/2024).    Target glucose decreased from 120 to 110  Repeat thyroid labs again in 6 weeks before making medication changes   Cgm reviewed, improving  A1c improving    Patient was given instructions and counseling regarding her condition or for health maintenance advice. Please see specific information pulled into the AVS if appropriate.     PORSHA Shaw

## 2024-04-19 ENCOUNTER — SPECIALTY PHARMACY (OUTPATIENT)
Dept: ENDOCRINOLOGY | Age: 44
End: 2024-04-19
Payer: COMMERCIAL

## 2024-04-19 NOTE — PROGRESS NOTES
"Specialty Pharmacy Refill Coordination Note     Cora \"Cora Gutiérrez\" is a 44 y.o. female contacted today regarding refills of  1 specialty medication(s).    Reviewed and verified with patient:       Specialty medication(s) and dose(s) confirmed: yes    Refill Questions      Flowsheet Row Most Recent Value   Changes to allergies? No   Changes to medications? No   New conditions or infections since last clinic visit No   Unplanned office visit, urgent care, ED, or hospital admission in the last 4 weeks  No   How does patient/caregiver feel medication is working? Good   Financial problems or insurance changes  No   Since the previous refill, were any specialty medication doses or scheduled injections missed or delayed?  No   Does this patient require a clinical escalation to a pharmacist? No            Delivery Questions      Flowsheet Row Most Recent Value   Delivery method Beeline   Delivery address verified with patient/caregiver? Yes   Delivery address Home   Number of medications in delivery 1   Medication(s) being filled and delivered Dapagliflozin Prop-Metformin   Doses left of specialty medications 1 week   Copay verified? Yes   Copay amount 0   Copay form of payment No copayment ($0)              Medication Adherence    Adherence tools used: patient uses a pill box to manage medications  Support network for adherence: family member, healthcare provider          Follow-up: 25 day(s)     Debbie Washburn, Pharmacy Technician  Specialty Pharmacy Technician        "

## 2024-04-29 NOTE — TELEPHONE ENCOUNTER
Rx Refill Note  Requested Prescriptions     Pending Prescriptions Disp Refills    Tirzepatide (Mounjaro) 5 MG/0.5ML solution pen-injector pen 2 mL 0     Sig: Inject 0.5 mL under the skin into the appropriate area as directed 1 (One) Time Per Week.      Last office visit with prescribing clinician: 4/9/2024   Last telemedicine visit with prescribing clinician: Visit date not found   Next office visit with prescribing clinician: 8/13/2024                         Would you like a call back once the refill request has been completed: [] Yes [] No    If the office needs to give you a call back, can they leave a voicemail: [] Yes [] No    Bhavya Webb MA  04/29/24, 09:09 EDT

## 2024-05-01 ENCOUNTER — SPECIALTY PHARMACY (OUTPATIENT)
Dept: ENDOCRINOLOGY | Age: 44
End: 2024-05-01
Payer: COMMERCIAL

## 2024-05-01 NOTE — PROGRESS NOTES
"Specialty Pharmacy Refill Coordination Note     Cora \"Cora Gutiérrez\" is a 44 y.o. female contacted today regarding refills of  2 specialty medication(s).    Reviewed and verified with patient:       Specialty medication(s) and dose(s) confirmed: yes    Refill Questions      Flowsheet Row Most Recent Value   Changes to allergies? No   Changes to medications? No   New conditions or infections since last clinic visit No   Unplanned office visit, urgent care, ED, or hospital admission in the last 4 weeks  No   How does patient/caregiver feel medication is working? Good   Financial problems or insurance changes  No   Since the previous refill, were any specialty medication doses or scheduled injections missed or delayed?  No   Does this patient require a clinical escalation to a pharmacist? No            Delivery Questions      Flowsheet Row Most Recent Value   Delivery method FedEx   Delivery address verified with patient/caregiver? Yes   Delivery address Home   Number of medications in delivery 2   Medication(s) being filled and delivered Icosapent Ethyl, Tirzepatide   Doses left of specialty medications 1 week   Copay verified? Yes   Copay amount 34   Copay form of payment Credit/debit on file              Medication Adherence    Adherence tools used: patient uses a pill box to manage medications  Support network for adherence: family member, healthcare provider          Follow-up: 25 day(s)     Debbie Washburn, Pharmacy Technician  Specialty Pharmacy Technician        "

## 2024-05-17 ENCOUNTER — SPECIALTY PHARMACY (OUTPATIENT)
Dept: ENDOCRINOLOGY | Age: 44
End: 2024-05-17
Payer: COMMERCIAL

## 2024-05-17 RX ORDER — PROCHLORPERAZINE 25 MG/1
1 SUPPOSITORY RECTAL
Qty: 1 EACH | Refills: 0 | Status: SHIPPED | OUTPATIENT
Start: 2024-05-17

## 2024-05-17 RX ORDER — INSULIN ASPART 100 [IU]/ML
100 INJECTION, SOLUTION INTRAVENOUS; SUBCUTANEOUS DAILY
Qty: 90 ML | Refills: 0 | Status: SHIPPED | OUTPATIENT
Start: 2024-05-17

## 2024-05-17 RX ORDER — DAPAGLIFLOZIN AND METFORMIN HYDROCHLORIDE 5; 1000 MG/1; MG/1
1 TABLET, FILM COATED, EXTENDED RELEASE ORAL 2 TIMES DAILY
Qty: 180 TABLET | Refills: 0 | Status: SHIPPED | OUTPATIENT
Start: 2024-05-17

## 2024-05-17 RX ORDER — ROSUVASTATIN CALCIUM 40 MG/1
40 TABLET, COATED ORAL DAILY
Qty: 90 TABLET | Refills: 0 | Status: SHIPPED | OUTPATIENT
Start: 2024-05-17

## 2024-05-17 RX ORDER — PROCHLORPERAZINE 25 MG/1
SUPPOSITORY RECTAL
Qty: 9 EACH | Refills: 0 | Status: SHIPPED | OUTPATIENT
Start: 2024-05-17 | End: 2024-08-15

## 2024-05-17 RX ORDER — FENOFIBRATE 145 MG/1
145 TABLET, COATED ORAL DAILY
Qty: 90 TABLET | Refills: 0 | Status: SHIPPED | OUTPATIENT
Start: 2024-05-17

## 2024-05-17 NOTE — TELEPHONE ENCOUNTER
Rx Refill Note  Requested Prescriptions     Pending Prescriptions Disp Refills    Continuous Glucose Transmitter (Dexcom G6 Transmitter) misc 1 each 1     Sig: Use as Directed and Replace Transmitter Every 3 Months    Continuous Glucose Sensor (Dexcom G6 Sensor) 9 each 1     Sig: Apply  topically Every 10 (Ten) Days for 90 days.    rosuvastatin (CRESTOR) 40 MG tablet 90 tablet 1     Sig: Take 1 tablet by mouth Daily.    fenofibrate (TRICOR) 145 MG tablet 90 tablet 1     Sig: Take 1 tablet by mouth Daily.    Insulin Aspart (NovoLOG) 100 UNIT/ML injection 90 mL 0     Sig: Use 100 units daily via Omnipod as directed.    dapagliflozin-metformin HCl ER (Xigduo XR) 5-1000 MG tablet 180 tablet 0     Sig: Take 1 tablet by mouth 2 (Two) Times a Day.      Last office visit with prescribing clinician: 4/9/2024   Last telemedicine visit with prescribing clinician: Visit date not found   Next office visit with prescribing clinician: 8/13/2024                         Would you like a call back once the refill request has been completed: [] Yes [] No    If the office needs to give you a call back, can they leave a voicemail: [] Yes [] No    Arely Adkins MA  05/17/24, 09:25 EDT

## 2024-05-17 NOTE — PROGRESS NOTES
"Specialty Pharmacy Refill Coordination Note     Cora \"Cora Gutiérrez\" is a 44 y.o. female contacted today regarding refills of  5 specialty medication(s).    Reviewed and verified with patient:       Specialty medication(s) and dose(s) confirmed: yes    Refill Questions      Flowsheet Row Most Recent Value   Changes to allergies? No   Changes to medications? No   New conditions or infections since last clinic visit No   Unplanned office visit, urgent care, ED, or hospital admission in the last 4 weeks  No   How does patient/caregiver feel medication is working? Good   Financial problems or insurance changes  No   Since the previous refill, were any specialty medication doses or scheduled injections missed or delayed?  No   Does this patient require a clinical escalation to a pharmacist? No            Delivery Questions      Flowsheet Row Most Recent Value   Delivery method Beeline   Delivery address verified with patient/caregiver? No   Delivery address Home   Number of medications in delivery 5   Medication(s) being filled and delivered Dapagliflozin Prop-Metformin, Fenofibrate (Fibric Acid Derivatives), Insulin Aspart, Continuous Glucose Sensor, Rosuvastatin Calcium   Doses left of specialty medications 1 week   Copay verified? Yes   Copay amount 47.73   Copay form of payment HSA/FSA on file              Medication Adherence    Adherence tools used: patient uses a pill box to manage medications  Support network for adherence: family member, healthcare provider          Follow-up: 25 day(s)     Debbie Washburn, Pharmacy Technician  Specialty Pharmacy Technician        "

## 2024-05-21 NOTE — TELEPHONE ENCOUNTER
Rx Refill Note  Requested Prescriptions     Pending Prescriptions Disp Refills    Tirzepatide (Mounjaro) 5 MG/0.5ML solution pen-injector pen 2 mL 0     Sig: Inject 0.5 mL under the skin into the appropriate area as directed 1 (One) Time Per Week.      Last office visit with prescribing clinician: 4/9/2024   Last telemedicine visit with prescribing clinician: Visit date not found   Next office visit with prescribing clinician: 8/13/2024                         Would you like a call back once the refill request has been completed: [] Yes [] No    If the office needs to give you a call back, can they leave a voicemail: [] Yes [] No    Arely Adkins MA  05/21/24, 12:02 EDT

## 2024-05-24 ENCOUNTER — SPECIALTY PHARMACY (OUTPATIENT)
Dept: ENDOCRINOLOGY | Age: 44
End: 2024-05-24
Payer: COMMERCIAL

## 2024-05-24 NOTE — TELEPHONE ENCOUNTER
Specialty Pharmacy Patient Management Program  Prescription Refill Request     Patient currently fills medications at  Pharmacy. Needing refill(s) on the following:      Requested Prescriptions     Pending Prescriptions Disp Refills    Tirzepatide (Mounjaro) 7.5 MG/0.5ML solution pen-injector pen 2 mL 0     Sig: Inject 0.5 mL under the skin into the appropriate area as directed 1 (One) Time Per Week.       Pended for PORSHA Shaw to review, and approve if appropriate.

## 2024-05-24 NOTE — PROGRESS NOTES
"Specialty Pharmacy Refill Coordination Note     Cora \"Cora Gutiérrez\" is a 44 y.o. female contacted today regarding refills of  2 specialty medication(s).    Reviewed and verified with patient:       Specialty medication(s) and dose(s) confirmed: yes    Refill Questions      Flowsheet Row Most Recent Value   Changes to allergies? No   Changes to medications? No   New conditions or infections since last clinic visit No   Unplanned office visit, urgent care, ED, or hospital admission in the last 4 weeks  No   How does patient/caregiver feel medication is working? Good   Financial problems or insurance changes  No   Since the previous refill, were any specialty medication doses or scheduled injections missed or delayed?  No   Does this patient require a clinical escalation to a pharmacist? No            Delivery Questions      Flowsheet Row Most Recent Value   Delivery method Beeline   Delivery address verified with patient/caregiver? Yes   Delivery address Home   Number of medications in delivery 2   Medication(s) being filled and delivered Tirzepatide, Icosapent Ethyl   Doses left of specialty medications 1 week   Copay verified? Yes   Copay amount 34   Copay form of payment Credit/debit on file              Medication Adherence    Adherence tools used: patient uses a pill box to manage medications  Support network for adherence: family member, healthcare provider          Follow-up: 25 day(s)     Debbie Washburn, Pharmacy Technician  Specialty Pharmacy Technician        "

## 2024-06-03 DIAGNOSIS — E03.9 ACQUIRED HYPOTHYROIDISM: ICD-10-CM

## 2024-06-03 RX ORDER — LEVOTHYROXINE SODIUM 175 MCG
TABLET ORAL
Qty: 90 TABLET | Refills: 0 | Status: SHIPPED | OUTPATIENT
Start: 2024-06-03

## 2024-06-03 NOTE — TELEPHONE ENCOUNTER
Rx Refill Note  Requested Prescriptions     Pending Prescriptions Disp Refills    Synthroid 175 MCG tablet [Pharmacy Med Name: SYNTHROID    TAB 175MCG] 30 tablet      Sig: TAKE 1 TABLET DAILY IN THE MORNING AS DIRECTED ON EMPTY STOMACH WITH WATER ONLY FOR HYPOTHYROIDISM      Last office visit with prescribing clinician: 4/9/2024   Last telemedicine visit with prescribing clinician: Visit date not found   Next office visit with prescribing clinician: 8/13/2024                         Would you like a call back once the refill request has been completed: [] Yes [] No    If the office needs to give you a call back, can they leave a voicemail: [] Yes [] No    Ying Smith MA  06/03/24, 08:57 EDT

## 2024-06-04 ENCOUNTER — SPECIALTY PHARMACY (OUTPATIENT)
Dept: ENDOCRINOLOGY | Age: 44
End: 2024-06-04
Payer: COMMERCIAL

## 2024-06-04 NOTE — PROGRESS NOTES
"Specialty Pharmacy Refill Coordination Note     Cora \"Cora Gutiérrez\" is a 44 y.o. female contacted today regarding refills of  1 specialty medication(s).    Reviewed and verified with patient:       Specialty medication(s) and dose(s) confirmed: yes    Refill Questions      Flowsheet Row Most Recent Value   Changes to allergies? No   Changes to medications? No   New conditions or infections since last clinic visit No   Unplanned office visit, urgent care, ED, or hospital admission in the last 4 weeks  No   How does patient/caregiver feel medication is working? Good   Financial problems or insurance changes  No   Since the previous refill, were any specialty medication doses or scheduled injections missed or delayed?  No   Does this patient require a clinical escalation to a pharmacist? No            Delivery Questions      Flowsheet Row Most Recent Value   Delivery method Beeline   Delivery address verified with patient/caregiver? Yes   Delivery address Home   Number of medications in delivery 1   Medication(s) being filled and delivered Continuous Glucose Transmitter   Doses left of specialty medications 1 week   Copay verified? Yes   Copay amount 0   Copay form of payment No copayment ($0)              Medication Adherence    Adherence tools used: patient uses a pill box to manage medications  Support network for adherence: family member, healthcare provider          Follow-up: 85 day(s)     Debbie Washburn, Pharmacy Technician  Specialty Pharmacy Technician        "

## 2024-06-14 ENCOUNTER — SPECIALTY PHARMACY (OUTPATIENT)
Dept: ENDOCRINOLOGY | Age: 44
End: 2024-06-14
Payer: COMMERCIAL

## 2024-06-14 NOTE — PROGRESS NOTES
"Specialty Pharmacy Refill Coordination Note     Cora \"Cora Gutiérrez\" is a 44 y.o. female contacted today regarding refills of  5 specialty medication(s).    Reviewed and verified with patient:       Specialty medication(s) and dose(s) confirmed: yes    Refill Questions      Flowsheet Row Most Recent Value   Changes to allergies? No   Changes to medications? No   New conditions or infections since last clinic visit No   Unplanned office visit, urgent care, ED, or hospital admission in the last 4 weeks  No   How does patient/caregiver feel medication is working? Good   Financial problems or insurance changes  No   Since the previous refill, were any specialty medication doses or scheduled injections missed or delayed?  No   Does this patient require a clinical escalation to a pharmacist? No            Delivery Questions      Flowsheet Row Most Recent Value   Delivery method Beeline   Delivery address verified with patient/caregiver? Yes   Delivery address Home   Number of medications in delivery 5   Medication(s) being filled and delivered Fenofibrate (Fibric Acid Derivatives), Rosuvastatin Calcium, Continuous Glucose Sensor, Dapagliflozin Prop-Metformin, Insulin Aspart   Doses left of specialty medications 1 week   Copay verified? Yes   Copay amount 44.84   Copay form of payment Credit/debit on file              Medication Adherence    Adherence tools used: patient uses a pill box to manage medications  Support network for adherence: family member, healthcare provider          Follow-up: 25 day(s)     Debbie Washburn, Pharmacy Technician  Specialty Pharmacy Technician        "

## 2024-06-20 ENCOUNTER — SPECIALTY PHARMACY (OUTPATIENT)
Dept: ENDOCRINOLOGY | Age: 44
End: 2024-06-20
Payer: COMMERCIAL

## 2024-06-20 DIAGNOSIS — E78.2 HYPERLIPEMIA, MIXED: ICD-10-CM

## 2024-06-20 RX ORDER — ICOSAPENT ETHYL 1000 MG/1
2 CAPSULE ORAL 2 TIMES DAILY WITH MEALS
Qty: 360 CAPSULE | Refills: 1 | Status: SHIPPED | OUTPATIENT
Start: 2024-06-20

## 2024-06-20 NOTE — TELEPHONE ENCOUNTER
Rx Refill Note  Requested Prescriptions     Pending Prescriptions Disp Refills    Tirzepatide (Mounjaro) 7.5 MG/0.5ML solution pen-injector pen 2 mL 0     Sig: Inject 0.5 mL under the skin into the appropriate area as directed 1 (One) Time Per Week.      Last office visit with prescribing clinician: 4/9/2024   Last telemedicine visit with prescribing clinician: Visit date not found   Next office visit with prescribing clinician: 8/13/2024                         Would you like a call back once the refill request has been completed: [] Yes [] No    If the office needs to give you a call back, can they leave a voicemail: [] Yes [] No    Ramona Webb  06/20/24, 10:35 EDT

## 2024-06-20 NOTE — PROGRESS NOTES
"Specialty Pharmacy Refill Coordination Note     Cora \"Cora Gutiérrez\" is a 44 y.o. female contacted today regarding refills of  2 specialty medication(s).    Reviewed and verified with patient:       Specialty medication(s) and dose(s) confirmed: yes    Refill Questions      Flowsheet Row Most Recent Value   Changes to allergies? No   Changes to medications? No   New conditions or infections since last clinic visit No   Unplanned office visit, urgent care, ED, or hospital admission in the last 4 weeks  No   How does patient/caregiver feel medication is working? Good   Financial problems or insurance changes  No   Since the previous refill, were any specialty medication doses or scheduled injections missed or delayed?  No   Does this patient require a clinical escalation to a pharmacist? No            Delivery Questions      Flowsheet Row Most Recent Value   Delivery method [employer]Ex   Delivery address verified with patient/caregiver? Yes   Delivery address Home   Number of medications in delivery 2   Medication(s) being filled and delivered Tirzepatide, Icosapent Ethyl   Doses left of specialty medications 1 week   Copay verified? Yes   Copay amount 34   Copay form of payment Credit/debit on file              Medication Adherence    Adherence tools used: patient uses a pill box to manage medications  Support network for adherence: family member, healthcare provider          Follow-up: 25 day(s)     Debbie Washburn, Pharmacy Technician  Specialty Pharmacy Technician        "

## 2024-06-20 NOTE — TELEPHONE ENCOUNTER
Rx Refill Note  Requested Prescriptions     Pending Prescriptions Disp Refills    Vascepa 1 g capsule capsule 360 capsule 1     Sig: Take 2 capsules by mouth 2 (Two) Times a Day With Meals.      Last office visit with prescribing clinician: 4/9/2024   Last telemedicine visit with prescribing clinician: Visit date not found   Next office visit with prescribing clinician: 8/13/2024                         Would you like a call back once the refill request has been completed: [] Yes [] No    If the office needs to give you a call back, can they leave a voicemail: [] Yes [] No    Deidre Alcazar MA  06/20/24, 14:23 EDT

## 2024-06-26 ENCOUNTER — TELEMEDICINE (OUTPATIENT)
Dept: FAMILY MEDICINE CLINIC | Facility: TELEHEALTH | Age: 44
End: 2024-06-26
Payer: COMMERCIAL

## 2024-06-26 DIAGNOSIS — T36.95XA ANTIBIOTIC-INDUCED YEAST INFECTION: ICD-10-CM

## 2024-06-26 DIAGNOSIS — R39.89 SUSPECTED UTI: Primary | ICD-10-CM

## 2024-06-26 DIAGNOSIS — B37.9 ANTIBIOTIC-INDUCED YEAST INFECTION: ICD-10-CM

## 2024-06-26 PROCEDURE — 99213 OFFICE O/P EST LOW 20 MIN: CPT | Performed by: NURSE PRACTITIONER

## 2024-06-26 RX ORDER — FLUCONAZOLE 150 MG/1
150 TABLET ORAL
Qty: 2 TABLET | Refills: 0 | Status: SHIPPED | OUTPATIENT
Start: 2024-06-26 | End: 2024-06-30

## 2024-06-26 RX ORDER — PHENAZOPYRIDINE HYDROCHLORIDE 200 MG/1
200 TABLET, FILM COATED ORAL 3 TIMES DAILY PRN
Qty: 6 TABLET | Refills: 0 | Status: SHIPPED | OUTPATIENT
Start: 2024-06-26 | End: 2024-06-28

## 2024-06-26 RX ORDER — NITROFURANTOIN 25; 75 MG/1; MG/1
100 CAPSULE ORAL 2 TIMES DAILY
Qty: 10 CAPSULE | Refills: 0 | Status: SHIPPED | OUTPATIENT
Start: 2024-06-26 | End: 2024-07-01

## 2024-06-26 NOTE — PATIENT INSTRUCTIONS
Drink plenty of water and avoid caffeine  If symptoms do not improve in 3-5 days follow up with your primary care provider or urgent care     Urinary Tract Infection, Adult  A urinary tract infection (UTI) is an infection of any part of the urinary tract. The urinary tract includes:  The kidneys.  The ureters.  The bladder.  The urethra.  These organs make, store, and get rid of pee (urine) in the body.  What are the causes?  This infection is caused by germs (bacteria) in your genital area. These germs grow and cause swelling (inflammation) of your urinary tract.  What increases the risk?  The following factors may make you more likely to develop this condition:  Using a small, thin tube (catheter) to drain pee.  Not being able to control when you pee or poop (incontinence).  Being female. If you are female, these things can increase the risk:  Using these methods to prevent pregnancy:  A medicine that kills sperm (spermicide).  A device that blocks sperm (diaphragm).  Having low levels of a female hormone (estrogen).  Being pregnant.  You are more likely to develop this condition if:  You have genes that add to your risk.  You are sexually active.  You take antibiotic medicines.  You have trouble peeing because of:  A prostate that is bigger than normal, if you are male.  A blockage in the part of your body that drains pee from the bladder.  A kidney stone.  A nerve condition that affects your bladder.  Not getting enough to drink.  Not peeing often enough.  You have other conditions, such as:  Diabetes.  A weak disease-fighting system (immune system).  Sickle cell disease.  Gout.  Injury of the spine.  What are the signs or symptoms?  Symptoms of this condition include:  Needing to pee right away.  Peeing small amounts often.  Pain or burning when peeing.  Blood in the pee.  Pee that smells bad or not like normal.  Trouble peeing.  Pee that is cloudy.  Fluid coming from the vagina, if you are female.  Pain in the  belly or lower back.  Other symptoms include:  Vomiting.  Not feeling hungry.  Feeling mixed up (confused). This may be the first symptom in older adults.  Being tired and grouchy (irritable).  A fever.  Watery poop (diarrhea).  How is this treated?  Taking antibiotic medicine.  Taking other medicines.  Drinking enough water.  In some cases, you may need to see a specialist.  Follow these instructions at home:    Medicines  Take over-the-counter and prescription medicines only as told by your doctor.  If you were prescribed an antibiotic medicine, take it as told by your doctor. Do not stop taking it even if you start to feel better.  General instructions  Make sure you:  Pee until your bladder is empty.  Do not hold pee for a long time.  Empty your bladder after sex.  Wipe from front to back after peeing or pooping if you are a female. Use each tissue one time when you wipe.  Drink enough fluid to keep your pee pale yellow.  Keep all follow-up visits.  Contact a doctor if:  You do not get better after 1-2 days.  Your symptoms go away and then come back.  Get help right away if:  You have very bad back pain.  You have very bad pain in your lower belly.  You have a fever.  You have chills.  You feeling like you will vomit or you vomit.  Summary  A urinary tract infection (UTI) is an infection of any part of the urinary tract.  This condition is caused by germs in your genital area.  There are many risk factors for a UTI.  Treatment includes antibiotic medicines.  Drink enough fluid to keep your pee pale yellow.  This information is not intended to replace advice given to you by your health care provider. Make sure you discuss any questions you have with your health care provider.  Document Revised: 07/25/2021 Document Reviewed: 07/30/2021  [employer] Patient Education © 2024 [employer]

## 2024-06-26 NOTE — PROGRESS NOTES
CHIEF COMPLAINT  Chief Complaint   Patient presents with    Urinary Tract Infection         HPI  Cora Gutiérrez is a 44 y.o. female  presents with complaint of symptoms of UTI that started 2 days ago. She has had symptoms in the past and the symptoms are the same.     Review of Systems   Constitutional:  Positive for chills. Negative for diaphoresis, fatigue and fever.   Gastrointestinal:  Negative for diarrhea, nausea and vomiting.   Genitourinary:  Positive for dysuria, frequency, pelvic pain and urgency. Negative for decreased urine volume, difficulty urinating, enuresis, flank pain, genital sores, hematuria, menstrual problem, vaginal bleeding, vaginal discharge and vaginal pain.   Musculoskeletal:  Negative for myalgias.       Past Medical History:   Diagnosis Date    BMI 40.0-44.9, adult     Diabetes mellitus     Fatty liver     H/O Pancreatitis, acute     Hyperlipidemia     Hypothyroidism     Left ovarian cyst     Type 2 diabetes mellitus        Family History   Problem Relation Age of Onset    Diabetes Mother     Hyperlipidemia Mother     Hypertension Mother     Cervical cancer Mother     Other Father         suicide?       Social History     Socioeconomic History    Marital status:     Number of children: 0   Tobacco Use    Smoking status: Former     Current packs/day: 0.50     Average packs/day: 0.5 packs/day for 3.0 years (1.5 ttl pk-yrs)     Types: Cigarettes     Passive exposure: Never    Smokeless tobacco: Never    Tobacco comments:     quit cigarrettes 4/01/19, now ecig    Vaping Use    Vaping status: Every Day    Substances: Nicotine, Flavoring   Substance and Sexual Activity    Alcohol use: Yes     Comment: Not enough to even register that i drink    Drug use: No    Sexual activity: Yes     Partners: Male     Birth control/protection: OCP         LMP 06/12/2024 (Approximate)   Breastfeeding No     PHYSICAL EXAM  Physical Exam   Constitutional: She is oriented to person, place, and time.  She appears well-developed and well-nourished. She does not have a sickly appearance. She does not appear ill. No distress.   HENT:   Head: Normocephalic and atraumatic.   Eyes: EOM are normal.   Neck: Neck normal appearance.  Pulmonary/Chest: Effort normal.  No respiratory distress.  Neurological: She is alert and oriented to person, place, and time.   Skin: Skin is dry.   Psychiatric: She has a normal mood and affect.       Results for orders placed or performed in visit on 04/04/24   Vitamin B12 & Folate    Specimen: Blood   Result Value Ref Range    Vitamin B-12 354 211 - 946 pg/mL    Folate 12.30 4.78 - 24.20 ng/mL   Vitamin D,25-Hydroxy    Specimen: Blood   Result Value Ref Range    25 Hydroxy, Vitamin D 15.9 (L) 30.0 - 100.0 ng/ml   Lipid Panel    Specimen: Blood   Result Value Ref Range    Total Cholesterol 462 (H) 0 - 200 mg/dL    Triglycerides 1,751 (H) 0 - 150 mg/dL    HDL Cholesterol 25 (L) 40 - 60 mg/dL    VLDL Cholesterol Dionte Comment mg/dL    LDL Chol Calc (NIH) Comment mg/dL   Comprehensive Metabolic Panel    Specimen: Blood   Result Value Ref Range    Glucose 120 (H) 65 - 99 mg/dL    BUN 13 6 - 20 mg/dL    Creatinine 0.81 0.57 - 1.00 mg/dL    EGFR Result 92.5 >60.0 mL/min/1.73    BUN/Creatinine Ratio 16.0 7.0 - 25.0    Sodium 139 136 - 145 mmol/L    Potassium 4.1 3.5 - 5.2 mmol/L    Chloride 102 98 - 107 mmol/L    Total CO2 20.5 (L) 22.0 - 29.0 mmol/L    Calcium 9.7 8.6 - 10.5 mg/dL    Total Protein 7.2 6.0 - 8.5 g/dL    Albumin 4.1 3.5 - 5.2 g/dL    Globulin 3.1 gm/dL    A/G Ratio 1.3 g/dL    Total Bilirubin 0.3 0.0 - 1.2 mg/dL    Alkaline Phosphatase 52 39 - 117 U/L    AST (SGOT) 32 1 - 32 U/L    ALT (SGPT) 35 (H) 1 - 33 U/L   Hemoglobin A1c    Specimen: Blood   Result Value Ref Range    Hemoglobin A1C 7.60 (H) 4.80 - 5.60 %   T4, Free    Specimen: Blood   Result Value Ref Range    Free T4 0.85 (L) 0.93 - 1.70 ng/dL   TSH    Specimen: Blood   Result Value Ref Range    TSH 24.800 (H) 0.270 - 4.200  uIU/mL   Cardiovascular Risk Assessment   Result Value Ref Range    Interpretation Note        Diagnoses and all orders for this visit:    1. Suspected UTI (Primary)    2. Antibiotic-induced yeast infection    Other orders  -     nitrofurantoin, macrocrystal-monohydrate, (Macrobid) 100 MG capsule; Take 1 capsule by mouth 2 (Two) Times a Day for 5 days.  Dispense: 10 capsule; Refill: 0  -     phenazopyridine (Pyridium) 200 MG tablet; Take 1 tablet by mouth 3 (Three) Times a Day As Needed for Bladder Spasms for up to 2 days.  Dispense: 6 tablet; Refill: 0  -     fluconazole (Diflucan) 150 MG tablet; Take 1 tablet by mouth Every 3 (Three) Days for 2 doses.  Dispense: 2 tablet; Refill: 0        The use of a video visit has been reviewed with the patient and verbal informed consent has been obtained. Myself and Cora Gutiérrez participated in this visit. The patient is located in [address] [place] [address]. I am located in [place], [place]. [employer] and [employer] were utilized.       Note Disclaimer: At [facility], we believe that sharing information builds trust and better   relationships. You are receiving this note because you recently visited [facility]. It is possible you   will see health information before a provider has talked with you about it. This kind of information can   be easy to misunderstand. To help you fully understand what it means for your health, we urge you to   discuss this note with your provider.    PORSHA Brandon  06/26/2024  09:47 EDT

## 2024-07-12 ENCOUNTER — SPECIALTY PHARMACY (OUTPATIENT)
Dept: ENDOCRINOLOGY | Age: 44
End: 2024-07-12
Payer: COMMERCIAL

## 2024-07-12 NOTE — PROGRESS NOTES
"Specialty Pharmacy Refill Coordination Note     Cora \"Cora Gutiérrez\" is a 44 y.o. female contacted today regarding refills of  5 specialty medication(s).    Reviewed and verified with patient:       Specialty medication(s) and dose(s) confirmed: yes    Refill Questions      Flowsheet Row Most Recent Value   Changes to allergies? No   Changes to medications? No   New conditions or infections since last clinic visit No   Unplanned office visit, urgent care, ED, or hospital admission in the last 4 weeks  No   How does patient/caregiver feel medication is working? Good   Financial problems or insurance changes  No   If yes, describe changes in insurance or financial issues. n/a   Since the previous refill, were any specialty medication doses or scheduled injections missed or delayed?  No   Does this patient require a clinical escalation to a pharmacist? No            Delivery Questions      Flowsheet Row Most Recent Value   Delivery method Beeline   Delivery address verified with patient/caregiver? Yes   Delivery address Home   Number of medications in delivery 5   Medication(s) being filled and delivered Rosuvastatin Calcium, Fenofibrate (Fibric Acid Derivatives), Dapagliflozin Prop-Metformin, Insulin Aspart, Continuous Glucose Sensor   Doses left of specialty medications 1 week   Copay verified? Yes   Copay amount 48.85   Copay form of payment Credit/debit on file   Ship Date 07/15   Delivery Date 07/16   Signature Required No              Medication Adherence    Adherence tools used: patient uses a pill box to manage medications  Support network for adherence: family member, healthcare provider          Follow-up: 25 day(s)     Debbie Washburn, Pharmacy Technician  Specialty Pharmacy Technician        "

## 2024-08-01 ENCOUNTER — SPECIALTY PHARMACY (OUTPATIENT)
Dept: ENDOCRINOLOGY | Age: 44
End: 2024-08-01
Payer: COMMERCIAL

## 2024-08-01 DIAGNOSIS — E78.2 HYPERLIPEMIA, MIXED: ICD-10-CM

## 2024-08-01 RX ORDER — ICOSAPENT ETHYL 1 G/1
2 CAPSULE ORAL 2 TIMES DAILY WITH MEALS
Qty: 360 CAPSULE | Refills: 1 | Status: SHIPPED | OUTPATIENT
Start: 2024-08-01

## 2024-08-01 NOTE — PROGRESS NOTES
" Specialty Pharmacy Patient Management Program  Endocrinology Refill Outreach      Cora \"Cora Gutiérrez\" is a 44 y.o. female contacted today regarding refills of her medication(s).    Specialty medication(s) and dose(s) confirmed: yes    Refill Questions      Flowsheet Row Most Recent Value   Changes to allergies? No   Changes to medications? No   New conditions or infections since last clinic visit No   Unplanned office visit, urgent care, ED, or hospital admission in the last 4 weeks  No   How does patient/caregiver feel medication is working? Very good   Financial problems or insurance changes  No   Since the previous refill, were any specialty medication doses or scheduled injections missed or delayed?  No   Does this patient require a clinical escalation to a pharmacist? No          Delivery Questions      Flowsheet Row Most Recent Value   Delivery method Beeline   Delivery address verified with patient/caregiver? Yes   Delivery address Home   Number of medications in delivery 2   Medication(s) being filled and delivered Tirzepatide, Icosapent Ethyl   Copay verified? Yes   Copay amount 45   Copay form of payment Credit/debit on file   Ship Date 8/5   Delivery Date 8/6   Signature Required No            Follow-Up: 28 days    Radha Rowland, PharmD  Clinical Specialty Pharmacist, Endocrinology  8/1/2024  13:26 EDT       "

## 2024-08-01 NOTE — TELEPHONE ENCOUNTER
Specialty Pharmacy Patient Management Program  Prescription Refill Request     Patient currently fills medications at  Pharmacy. Needing refill(s) on the following:      Requested Prescriptions     Pending Prescriptions Disp Refills    icosapent ethyl (Vascepa) 1 g capsule capsule 360 capsule 1     Sig: Take 2 g by mouth 2 (Two) Times a Day With Meals.     Insurance now prefers generic Vascepa. Sending new prescription without KAYLIE.    Last visit: 4/9/24  Next visit: 8/13/24    Pended for PORSHA Shaw to review, and approve if appropriate.     Radha Rowland, PharmD  Clinical Specialty Pharmacist, Endocrinology  8/1/2024  12:10 EDT

## 2024-08-06 ENCOUNTER — TELEPHONE (OUTPATIENT)
Dept: ENDOCRINOLOGY | Age: 44
End: 2024-08-06

## 2024-08-06 NOTE — TELEPHONE ENCOUNTER
"        Hub staff attempted to follow warm transfer process and was unsuccessful     Caller: Cora Gutiérrez \"Cora Gutiérrez\"    Relationship to patient: Self    Best call back number:     [phone]       Patient is needing: THE PT NEEDS TO SCHEDULE A LAB APPT FOR THE WEEK BEFORE HER NEXT FOLLOW UP. PLEASE ADVISE.         "
05-Feb-2021 21:38

## 2024-08-07 NOTE — TELEPHONE ENCOUNTER
Specialty Pharmacy Patient Management Program  Prescription Refill Request     Patient currently fills medications at  Pharmacy. Needing refill(s) on the following:      Requested Prescriptions     Pending Prescriptions Disp Refills    Tirzepatide (Mounjaro) 7.5 MG/0.5ML solution pen-injector pen 2 mL 0     Sig: Inject 0.5 mL under the skin into the appropriate area as directed 1 (One) Time Per Week.     Patient received mounjaro 5 mg this month due to availability. Pending a refill for mounjaro 7.5 mg to place on the wait list so that patient can increase the dose next month.    Last visit: 4/9/24  Next visit: 9/11/24    Pended for PORSHA Shaw to review, and approve if appropriate.     Radha Rowland, PharmD  Clinical Specialty Pharmacist, Endocrinology  8/7/2024  10:22 EDT

## 2024-08-12 RX ORDER — PROCHLORPERAZINE 25 MG/1
SUPPOSITORY RECTAL
Qty: 9 EACH | Refills: 0 | Status: SHIPPED | OUTPATIENT
Start: 2024-08-12 | End: 2024-11-10

## 2024-08-12 RX ORDER — INSULIN ASPART 100 [IU]/ML
100 INJECTION, SOLUTION INTRAVENOUS; SUBCUTANEOUS DAILY
Qty: 90 ML | Refills: 0 | Status: SHIPPED | OUTPATIENT
Start: 2024-08-12

## 2024-08-12 RX ORDER — DAPAGLIFLOZIN AND METFORMIN HYDROCHLORIDE 5; 1000 MG/1; MG/1
1 TABLET, FILM COATED, EXTENDED RELEASE ORAL 2 TIMES DAILY
Qty: 180 TABLET | Refills: 0 | Status: SHIPPED | OUTPATIENT
Start: 2024-08-12

## 2024-08-12 RX ORDER — ROSUVASTATIN CALCIUM 40 MG/1
40 TABLET, COATED ORAL DAILY
Qty: 90 TABLET | Refills: 0 | Status: SHIPPED | OUTPATIENT
Start: 2024-08-12

## 2024-08-12 RX ORDER — FENOFIBRATE 145 MG/1
145 TABLET, COATED ORAL DAILY
Qty: 90 TABLET | Refills: 0 | Status: SHIPPED | OUTPATIENT
Start: 2024-08-12

## 2024-08-12 NOTE — TELEPHONE ENCOUNTER
Rx Refill Note  Requested Prescriptions     Pending Prescriptions Disp Refills    Continuous Glucose Sensor (Dexcom G6 Sensor) 9 each 0     Sig: Apply  topically Every 10 (Ten) Days for 90 days.    rosuvastatin (CRESTOR) 40 MG tablet 90 tablet 0     Sig: Take 1 tablet by mouth Daily.    fenofibrate (TRICOR) 145 MG tablet 90 tablet 0     Sig: Take 1 tablet by mouth Daily.    Insulin Aspart (NovoLOG) 100 UNIT/ML injection 90 mL 0     Sig: Use 100 units daily via Omnipod as directed.    dapagliflozin-metformin HCl ER (Xigduo XR) 5-1000 MG tablet 180 tablet 0     Sig: Take 1 tablet by mouth 2 (Two) Times a Day.      Last office visit with prescribing clinician: Visit date not found   Last telemedicine visit with prescribing clinician: Visit date not found   Next office visit with prescribing clinician: Visit date not found                         Would you like a call back once the refill request has been completed: [] Yes [] No    If the office needs to give you a call back, can they leave a voicemail: [] Yes [] No    Ramona Webb  08/12/24, 10:11 EDT

## 2024-08-14 ENCOUNTER — SPECIALTY PHARMACY (OUTPATIENT)
Dept: ENDOCRINOLOGY | Age: 44
End: 2024-08-14
Payer: COMMERCIAL

## 2024-08-14 RX ORDER — PROCHLORPERAZINE 25 MG/1
1 SUPPOSITORY RECTAL
Qty: 1 EACH | Refills: 0 | Status: SHIPPED | OUTPATIENT
Start: 2024-08-14

## 2024-08-14 NOTE — TELEPHONE ENCOUNTER
Rx Refill Note  Requested Prescriptions     Pending Prescriptions Disp Refills    Continuous Glucose Transmitter (Dexcom G6 Transmitter) misc 1 each 0     Sig: Use as Directed and Replace Transmitter Every 3 Months      Last office visit with prescribing clinician: 4/9/2024   Last telemedicine visit with prescribing clinician: Visit date not found   Next office visit with prescribing clinician: 9/11/2024                         Would you like a call back once the refill request has been completed: [] Yes [] No    If the office needs to give you a call back, can they leave a voicemail: [] Yes [] No    Ramona Webb  08/14/24, 09:44 EDT

## 2024-08-14 NOTE — PROGRESS NOTES
"Specialty Pharmacy Refill Coordination Note     Cora \"Cora Gutiérrez\" is a 44 y.o. female contacted today regarding refills of  7 specialty medication(s).    Reviewed and verified with patient:       Specialty medication(s) and dose(s) confirmed: yes    Refill Questions      Flowsheet Row Most Recent Value   Changes to allergies? No   Changes to medications? No   New conditions or infections since last clinic visit No   Unplanned office visit, urgent care, ED, or hospital admission in the last 4 weeks  No   How does patient/caregiver feel medication is working? Good   Financial problems or insurance changes  No   Since the previous refill, were any specialty medication doses or scheduled injections missed or delayed?  No   Does this patient require a clinical escalation to a pharmacist? No            Delivery Questions      Flowsheet Row Most Recent Value   Delivery method Beeline   Delivery address verified with patient/caregiver? Yes   Delivery address Home   Number of medications in delivery 7   Medication(s) being filled and delivered Continuous Glucose Transmitter, Continuous Glucose Sensor, Rosuvastatin Calcium (Hmg Coa Reductase Inhibitors), Fenofibrate (Fibric Acid Derivatives), Insulin Aspart, Dapagliflozin Prop-Metformin, Tirzepatide   Doses left of specialty medications 1 week   Copay verified? Yes   Copay amount 73.84   Copay form of payment Credit/debit on file   Ship Date 08/15   Delivery Date 08/16   Signature Required No              Medication Adherence    Adherence tools used: patient uses a pill box to manage medications  Support network for adherence: family member, healthcare provider          Follow-up: 25 day(s)     Debbie Washburn, Pharmacy Technician  Specialty Pharmacy Technician        "

## 2024-08-16 ENCOUNTER — SPECIALTY PHARMACY (OUTPATIENT)
Dept: ENDOCRINOLOGY | Age: 44
End: 2024-08-16
Payer: COMMERCIAL

## 2024-08-16 NOTE — PROGRESS NOTES
Specialty Pharmacy Patient Management Program  Endocrinology Reassessment     Cora Gutiérrez was referred by an Endocrinology provider to the Endocrinology Patient Management program offered by [facility] for Type 2 Diabetes. A follow-up outreach was conducted, including assessment of continued therapy appropriateness, medication adherence, and side effect incidence and management for Mounjaro and Xigduo.    Changes to Insurance Coverage or Financial Support  Gerhard BCJAE    Relevant Past Medical History and Comorbidities  Relevant medical history and concomitant health conditions were discussed with the patient. The patient's chart has been reviewed for relevant past medical history and comorbid health conditions and updated as necessary.   Past Medical History:   Diagnosis Date    BMI 40.0-44.9, adult     Diabetes mellitus     Fatty liver     H/O Pancreatitis, acute     Hyperlipidemia     Hypothyroidism     Left ovarian cyst     Type 2 diabetes mellitus      Social History     Socioeconomic History    Marital status:     Number of children: 0   Tobacco Use    Smoking status: Former     Current packs/day: 0.50     Average packs/day: 0.5 packs/day for 3.0 years (1.5 ttl pk-yrs)     Types: Cigarettes     Passive exposure: Never    Smokeless tobacco: Never    Tobacco comments:     quit cigarrettes 4/01/19, now ecig    Vaping Use    Vaping status: Every Day    Substances: Nicotine, Flavoring   Substance and Sexual Activity    Alcohol use: Yes     Comment: Not enough to even register that i drink    Drug use: No    Sexual activity: Yes     Partners: Male     Birth control/protection: OCP     Problem list reviewed by Ying Syed [facility] on 8/16/2024 at 11:09 AM    Hospitalizations and Urgent Care Since Last Assessment  ED Visits, Admissions, or Hospitalizations: None reported or documented  Urgent Office Visits: None reported or documented     Allergies  Known allergies and reactions  were discussed with the patient. The patient's chart has been reviewed for allergy information and updated as necessary.   Allergies   Allergen Reactions    Trulicity [Dulaglutide] GI Intolerance     Also h/o pancreatitis; avoid GLP-1 analogs     Allergies reviewed by Ying Syed RPH on 8/16/2024 at 11:09 AM    Relevant Laboratory Values  Relevant laboratory values were discussed with the patient. The following specialty medication dose adjustment(s) are recommended: No adjustments needed at this time.   A1C Last 3 Results          11/1/2023    09:26 4/4/2024    08:36   HGBA1C Last 3 Results   Hemoglobin A1C 8.40  7.60      Lab Results   Component Value Date    HGBA1C 7.60 (H) 04/04/2024     Lab Results   Component Value Date    GLUCOSE 120 (H) 04/04/2024    CALCIUM 9.7 04/04/2024     04/04/2024    K 4.1 04/04/2024    CO2 20.5 (L) 04/04/2024     04/04/2024    BUN 13 04/04/2024    CREATININE 0.81 04/04/2024    EGFRIFAFRI 99 01/19/2022    EGFRIFNONA 86 01/19/2022    BCR 16.0 04/04/2024    ANIONGAP 11 11/07/2021     Lab Results   Component Value Date    CHLPL 462 (H) 04/04/2024    TRIG 1,751 (H) 04/04/2024    HDL 25 (L) 04/04/2024    LDL Comment 04/04/2024       Current Medication List  This medication list has been reviewed with the patient and evaluated for any interactions or necessary modifications/recommendations, and updated to include all prescription medications, OTC medications, and supplements the patient is currently taking.  This list reflects what is contained in the patient's profile, which has also been marked as reviewed to communicate to other providers it is the most up to date version of the patient's current medication therapy.     Current Outpatient Medications:     Accu-Chek Softclix Lancets lancets, Use to check blood sugar 3 times per day, Disp: 300 each, Rfl: 4    Blood Glucose Monitoring Suppl (Accu-Chek Kristina Plus) w/Device kit, Use to check blood sugar 3 times a day., Disp: 1  kit, Rfl: 0    Continuous Glucose Sensor (Dexcom G6 Sensor), Apply  topically Every 10 (Ten) Days for 90 days., Disp: 9 each, Rfl: 0    Continuous Glucose Transmitter (Dexcom G6 Transmitter) misc, Use as Directed and Replace Transmitter Every 3 Months, Disp: 1 each, Rfl: 0    dapagliflozin-metformin HCl ER (Xigduo XR) 5-1000 MG tablet, Take 1 tablet by mouth 2 (Two) Times a Day., Disp: 180 tablet, Rfl: 0    escitalopram (LEXAPRO) 10 MG tablet, TAKE ONE TABLET BY MOUTH DAILY, Disp: 90 tablet, Rfl: 3    fenofibrate (TRICOR) 145 MG tablet, Take 1 tablet by mouth Daily., Disp: 90 tablet, Rfl: 0    Glucagon (Baqsimi Two Pack) 3 MG/DOSE powder, Spray 3 mg into the nostril(s) as directed by provider As Needed for hypoglycemia on pump, Disp: 2 each, Rfl: 1    glucose blood (Accu-Chek SmartView) test strip, Check 4 times daily Use as instructed, Disp: 100 each, Rfl: 12    glucose blood test strip, Check Blood Sugar 3 (Three) Times a Day., Disp: 300 each, Rfl: 4    icosapent ethyl (Vascepa) 1 g capsule capsule, Take 2 capsules (2g) by mouth 2 (Two) Times a Day With Meals., Disp: 360 capsule, Rfl: 1    insulin aspart (NovoLOG FlexPen) 100 UNIT/ML solution pen-injector sc pen, Inject 120 Units under the skin into the appropriate area as directed Daily. Up to 120u daily with pump, Disp: 45 mL, Rfl: 0    Insulin Aspart (NovoLOG) 100 UNIT/ML injection, Use 100 units daily via Omnipod as directed., Disp: 90 mL, Rfl: 0    Insulin Disposable Pump (Omnipod 5 G6 Pod, Gen 5,) misc, ONE UNDER THE SKIN EVERY OTHER DAY, Disp: 45 each, Rfl: 0    Insulin Pen Needle (BD Pen Needle Gina U/F) 32G X 4 MM misc, USE FIVE TIMES DAILY WITH INSULIN, Disp: 500 each, Rfl: 0    lidocaine (LIDODERM) 5 %, Place 1 patch on the skin as directed by provider Daily As Needed (back pain)., Disp: , Rfl:     norgestimate-ethinyl estradiol (Tri-Sprintec) 0.18/0.215/0.25 MG-35 MCG per tablet, TAKE ONE TABLET BY MOUTH DAILY, Disp: 84 tablet, Rfl: 3    rosuvastatin  (CRESTOR) 40 MG tablet, Take 1 tablet by mouth Daily., Disp: 90 tablet, Rfl: 0    Synthroid 175 MCG tablet, TAKE 1 TABLET DAILY IN THE MORNING AS DIRECTED ON EMPTY STOMACH WITH WATER ONLY FOR HYPOTHYROIDISM, Disp: 90 tablet, Rfl: 0    Tirzepatide (Mounjaro) 7.5 MG/0.5ML solution pen-injector pen, Inject 0.5 mL under the skin into the appropriate area as directed 1 (One) Time Per Week., Disp: 2 mL, Rfl: 1    Medicines reviewed by Ying Syed [facility] on 8/16/2024 at 11:08 AM    Drug Interactions (per Lexicomp)  Mounjaro and Novolog  Consider insulin dose reductions when used in combination with glucagon-like peptide-1 agonists. Monitor patients for hypoglycemia.  Mounjaro and Tri-Sprintec  Patients using oral hormonal contraceptives should switch to a non-oral contraceptive method, or add a barrier method of contraception, for 4 weeks after initiation of tirzepatide and for 4 weeks after each dose escalation of tirzepatide.  Novolog and Xigduo XR  Consider a decrease in insulin dose when initiating therapy with a sodium-glucose cotransporter 2 inhibitor and monitor patients for hypoglycemia.    Recommended Medications Assessment  Aspirin: Not Taking Currently  Statin: Currently Taking   ACEi/ARB: Not Taking Currently    Adverse Drug Reactions  Medication tolerability: Tolerating with no to minimal ADRs  Medication plan: Continue therapy with normal follow-up  Plan for ADR Management: No ADRs reported     Adherence, Self-Administration, and Current Therapy Problems  Adherence related to the patient's specialty therapy was discussed with the patient. The Adherence segment of this outreach has been reviewed and updated.     Adherence Questions  Linked Medication(s) Assessed: Tirzepatide, Continuous Glucose Sensor, Continuous Glucose Transmitter, Dapagliflozin Prop-Metformin, Icosapent Ethyl  On average, how many doses/injections does the patient miss per month?: 1  What are the identified reasons for non-adherence or  missed doses? : no problems identified  What is the estimated medication adherence level?: % (Mounjaro adherence based on availability)  Based on the patient/caregiver response and refill history, does this patient require an MTP to track adherence improvements?: no    Additional Barriers to Patient Self-Administration: None identified or disclosed  Methods for Supporting Patient Self-Administration: Continue to follow with fills and clinical assessments     Open Medication Therapy Problems  No medication therapy recommendations to display    Goals of Therapy  Goals related to the patient's specialty therapy were discussed with the patient. The Patient Goals segment of this outreach has been reviewed and updated.   Goals Addressed Today        Specialty Pharmacy General Goal      Reduce HbA1c < 7.5%    Lab Results   Component Value Date    HGBA1C 7.60 (H) 04/04/2024    HGBA1C 8.40 (H) 11/01/2023    HGBA1C 8.30 (H) 08/17/2023    HGBA1C 8.30 (H) 04/04/2023    HGBA1C 7.90 (H) 11/30/2022                   Quality of Life Assessment   Quality of Life related to the patient's enrollment in the patient management program and services provided was discussed with the patient. The QOL segment of this outreach has been reviewed and updated.  Quality of Life Improvement Scale: 7-Somewhat better    Reassessment Plan & Follow-Up  1. Medication Therapy Changes: No changes  2. Related Plans, Therapy Recommendations, or Issues to Be Addressed: No issues  3. Pharmacist to perform regular assessments no more than (6) months from the previous assessment.  4. Care Coordinator to set up future refill outreaches, coordinate prescription delivery, and escalate clinical questions to pharmacist.    Attestation  Therapeutic appropriateness: Appropriate   I attest the patient was actively involved in and has agreed to the above plan of care.  If the prescribed therapy is at any point deemed not appropriate based on the current or future  assessments, a consultation will be initiated with the patient's specialty care provider to determine the best course of action. The revised plan of therapy will be documented along with any required assessments and/or additional patient education provided.     Donna Syed, PharmD, MM  Clinical Specialty Pharmacist, Endocrinology  8/16/2024  11:11 EDT

## 2024-08-30 ENCOUNTER — SPECIALTY PHARMACY (OUTPATIENT)
Dept: ENDOCRINOLOGY | Age: 44
End: 2024-08-30
Payer: COMMERCIAL

## 2024-08-30 NOTE — PROGRESS NOTES
"Specialty Pharmacy Refill Coordination Note     Cora \"Cora Gutiérrez\" is a 44 y.o. female contacted today regarding refills of  1 specialty medication(s).    Reviewed and verified with patient:       Specialty medication(s) and dose(s) confirmed: yes    Refill Questions      Flowsheet Row Most Recent Value   Changes to allergies? No   Changes to medications? No   New conditions or infections since last clinic visit No   Unplanned office visit, urgent care, ED, or hospital admission in the last 4 weeks  No   How does patient/caregiver feel medication is working? Good   Financial problems or insurance changes  No   Since the previous refill, were any specialty medication doses or scheduled injections missed or delayed?  No   Does this patient require a clinical escalation to a pharmacist? No            Delivery Questions      Flowsheet Row Most Recent Value   Delivery method UPS   Delivery address verified with patient/caregiver? Yes   Delivery address Home   Number of medications in delivery 1   Medication(s) being filled and delivered Icosapent Ethyl   Doses left of specialty medications 1 week   Copay verified? Yes   Copay amount $20   Copay form of payment Credit/debit on file   Ship Date 09/03   Delivery Date 09/04   Signature Required No              Medication Adherence    Adherence tools used: patient uses a pill box to manage medications  Support network for adherence: family member, healthcare provider          Follow-up: 25 day(s)     Debbie Washburn, Pharmacy Technician  Specialty Pharmacy Technician        "

## 2024-09-06 ENCOUNTER — SPECIALTY PHARMACY (OUTPATIENT)
Dept: ENDOCRINOLOGY | Age: 44
End: 2024-09-06
Payer: COMMERCIAL

## 2024-09-06 NOTE — TELEPHONE ENCOUNTER
Rx Refill Note  Requested Prescriptions     Pending Prescriptions Disp Refills    Tirzepatide (Mounjaro) 7.5 MG/0.5ML solution pen-injector pen 2 mL 1     Sig: Inject 0.5 mL under the skin into the appropriate area as directed 1 (One) Time Per Week.      Last office visit with prescribing clinician: 4/9/2024   Last telemedicine visit with prescribing clinician: Visit date not found   Next office visit with prescribing clinician: 10/1/2024                         Would you like a call back once the refill request has been completed: [] Yes [] No    If the office needs to give you a call back, can they leave a voicemail: [] Yes [] No    Ramona Webb  09/06/24, 10:38 EDT

## 2024-09-06 NOTE — PROGRESS NOTES
"Specialty Pharmacy Refill Coordination Note     Cora \"Cora Gutiérrez\" is a 44 y.o. female contacted today regarding refills of  6 specialty medication(s).    Reviewed and verified with patient:       Specialty medication(s) and dose(s) confirmed: yes    Refill Questions      Flowsheet Row Most Recent Value   Changes to allergies? No   Changes to medications? No   New conditions or infections since last clinic visit No   Unplanned office visit, urgent care, ED, or hospital admission in the last 4 weeks  No   How does patient/caregiver feel medication is working? Good   Financial problems or insurance changes  No   Since the previous refill, were any specialty medication doses or scheduled injections missed or delayed?  No   Does this patient require a clinical escalation to a pharmacist? No            Delivery Questions      Flowsheet Row Most Recent Value   Delivery method UPS   Delivery address verified with patient/caregiver? Yes   Delivery address Home   Number of medications in delivery 6   Medication(s) being filled and delivered Continuous Glucose Sensor, Rosuvastatin Calcium (Hmg Coa Reductase Inhibitors), Fenofibrate (Fibric Acid Derivatives), Insulin Aspart, Dapagliflozin Prop-Metformin, Tirzepatide   Doses left of specialty medications 1 week   Copay verified? Yes   Copay amount 73.77   Copay form of payment Credit/debit on file   Ship Date 09/09   Delivery Date 09/10   Signature Required No              Medication Adherence    Adherence tools used: patient uses a pill box to manage medications  Support network for adherence: family member, healthcare provider          Follow-up: 25 day(s)     Debbie Washburn, Pharmacy Technician  Specialty Pharmacy Technician        "

## 2024-09-13 ENCOUNTER — SPECIALTY PHARMACY (OUTPATIENT)
Dept: ENDOCRINOLOGY | Age: 44
End: 2024-09-13
Payer: COMMERCIAL

## 2024-09-13 NOTE — PROGRESS NOTES
"Specialty Pharmacy Refill Coordination Note     Cora \"Cora Gutiérrez\" is a 44 y.o. female contacted today regarding refills of  3 specialty medication(s).    Reviewed and verified with patient:       Specialty medication(s) and dose(s) confirmed: yes    Refill Questions      Flowsheet Row Most Recent Value   Changes to allergies? No   Changes to medications? No   New conditions or infections since last clinic visit No   Unplanned office visit, urgent care, ED, or hospital admission in the last 4 weeks  No   How does patient/caregiver feel medication is working? Good   Financial problems or insurance changes  No   Since the previous refill, were any specialty medication doses or scheduled injections missed or delayed?  No   Does this patient require a clinical escalation to a pharmacist? No            Delivery Questions      Flowsheet Row Most Recent Value   Delivery method UPS   Delivery address verified with patient/caregiver? Yes   Delivery address Home   Number of medications in delivery 3   Medication(s) being filled and delivered Tirzepatide, Dapagliflozin Prop-Metformin, Continuous Glucose Sensor   Doses left of specialty medications 1 weeek   Copay verified? Yes   Copay amount 25   Copay form of payment Credit/debit on file   Ship Date 09/16   Delivery Date 09/17   Signature Required No              Medication Adherence    Adherence tools used: patient uses a pill box to manage medications  Support network for adherence: family member, healthcare provider          Follow-up: 25 day(s)     Debbie Washburn, Pharmacy Technician  Specialty Pharmacy Technician        "

## 2024-09-25 ENCOUNTER — TELEPHONE (OUTPATIENT)
Dept: ENDOCRINOLOGY | Age: 44
End: 2024-09-25

## 2024-09-25 NOTE — TELEPHONE ENCOUNTER
"    Caller: Cora Gutiérrez \"Cora Gutiérrez\"    Relationship: Self    Best call back number: [phone]    What orders are you requesting (i.e. lab or imaging): LABS    In what timeframe would the patient need to come in: ONE WEEK FROM 1/3/25    Where will you receive your lab/imaging services: OUR OFFICE    Additional notes: PT ASKED FOR A CALL BACK AND OK TO LEAVE VOICEMAIL      "

## 2024-09-30 ENCOUNTER — SPECIALTY PHARMACY (OUTPATIENT)
Dept: ENDOCRINOLOGY | Age: 44
End: 2024-09-30
Payer: COMMERCIAL

## 2024-09-30 NOTE — PROGRESS NOTES
"Specialty Pharmacy Refill Coordination Note     Cora \"Cora Gutiérrez\" is a 44 y.o. female contacted today regarding refills of  2 specialty medication(s).    Reviewed and verified with patient:       Specialty medication(s) and dose(s) confirmed: yes    Refill Questions      Flowsheet Row Most Recent Value   Changes to allergies? No   Changes to medications? No   New conditions or infections since last clinic visit No   Unplanned office visit, urgent care, ED, or hospital admission in the last 4 weeks  No   How does patient/caregiver feel medication is working? Good   Financial problems or insurance changes  No   Since the previous refill, were any specialty medication doses or scheduled injections missed or delayed?  No   Does this patient require a clinical escalation to a pharmacist? No            Delivery Questions      Flowsheet Row Most Recent Value   Delivery method UPS   Delivery address verified with patient/caregiver? Yes   Delivery address Home   Number of medications in delivery 2   Medication(s) being filled and delivered Rosuvastatin Calcium (Hmg Coa Reductase Inhibitors), Fenofibrate (Fibric Acid Derivatives)   Doses left of specialty medications 1 week   Copay verified? Yes   Copay amount 22.48   Copay form of payment Credit/debit on file   Ship Date 09/30   Delivery Date 10/01   Signature Required No              Medication Adherence    Adherence tools used: patient uses a pill box to manage medications  Support network for adherence: family member, healthcare provider          Follow-up: 25 day(s)     Debbie Washburn, Pharmacy Technician  Specialty Pharmacy Technician        "

## 2024-10-09 NOTE — TELEPHONE ENCOUNTER
Rx Refill Note  Requested Prescriptions     Pending Prescriptions Disp Refills    Tirzepatide (Mounjaro) 7.5 MG/0.5ML solution pen-injector pen 2 mL 0     Sig: Inject 0.5 mL under the skin into the appropriate area as directed 1 (One) Time Per Week.      Last office visit with prescribing clinician: 4/9/2024   Last telemedicine visit with prescribing clinician: Visit date not found   Next office visit with prescribing clinician: 1/3/2025                         Would you like a call back once the refill request has been completed: [] Yes [] No    If the office needs to give you a call back, can they leave a voicemail: [] Yes [] No    Arely Adkins MA  10/09/24, 13:34 EDT

## 2024-10-15 ENCOUNTER — SPECIALTY PHARMACY (OUTPATIENT)
Dept: ENDOCRINOLOGY | Age: 44
End: 2024-10-15
Payer: COMMERCIAL

## 2024-10-15 NOTE — PROGRESS NOTES
"Specialty Pharmacy Refill Coordination Note     Cora \"Cora Gutiérrez\" is a 44 y.o. female contacted today regarding refills of  3 specialty medication(s).    Reviewed and verified with patient:       Specialty medication(s) and dose(s) confirmed: yes    Refill Questions      Flowsheet Row Most Recent Value   Changes to allergies? No   Changes to medications? No   New conditions or infections since last clinic visit No   Unplanned office visit, urgent care, ED, or hospital admission in the last 4 weeks  No   How does patient/caregiver feel medication is working? Good   Financial problems or insurance changes  No   Since the previous refill, were any specialty medication doses or scheduled injections missed or delayed?  No   Does this patient require a clinical escalation to a pharmacist? No            Delivery Questions      Flowsheet Row Most Recent Value   Delivery method UPS   Delivery address verified with patient/caregiver? Yes   Delivery address Home   Number of medications in delivery 3   Medication(s) being filled and delivered Dapagliflozin Prop-metFORMIN (XIGDUO XR), Tirzepatide (MOUNJARO), Continuous Glucose Sensor (Dexcom G6 Sensor)   Doses left of specialty medications 1   Copay verified? Yes   Copay amount 25   Copay form of payment Credit/debit on file   Ship Date 10/15   Delivery Date 10/16   Signature Required No              Medication Adherence    Adherence tools used: patient uses a pill box to manage medications  Support network for adherence: family member, healthcare provider          Follow-up: 25 day(s)     Debbie Washburn, Pharmacy Technician  Specialty Pharmacy Technician        "

## 2024-10-30 RX ORDER — PROCHLORPERAZINE 25 MG/1
SUPPOSITORY RECTAL
Qty: 3 EACH | Refills: 0 | OUTPATIENT
Start: 2024-10-30 | End: 2025-01-28

## 2024-10-30 RX ORDER — DAPAGLIFLOZIN AND METFORMIN HYDROCHLORIDE 5; 1000 MG/1; MG/1
1 TABLET, FILM COATED, EXTENDED RELEASE ORAL 2 TIMES DAILY
Qty: 60 TABLET | Refills: 0 | OUTPATIENT
Start: 2024-10-30

## 2024-10-30 NOTE — TELEPHONE ENCOUNTER
Specialty Pharmacy Patient Management Program  Prescription Refill Request     Patient currently fills medications at  Pharmacy. Needing refill(s) on the following:      Requested Prescriptions     Pending Prescriptions Disp Refills    Continuous Glucose Sensor (Dexcom G6 Sensor) 9 each 0     Sig: Apply  topically Every 10 (Ten) Days for 90 days.    dapagliflozin-metformin HCl ER (Xigduo XR) 5-1000 MG tablet 180 tablet 0     Sig: Take 1 tablet by mouth 2 (Two) Times a Day.       Last visit: 4/9/24  Next visit: 1/3/25    Pended for PORSHA Shaw to review, and approve if appropriate.     Radha Rowland, PharmD  Clinical Specialty Pharmacist, Endocrinology  10/30/2024  15:01 EDT

## 2024-10-31 DIAGNOSIS — Z79.4 TYPE 2 DIABETES MELLITUS WITH HYPERGLYCEMIA, WITH LONG-TERM CURRENT USE OF INSULIN: ICD-10-CM

## 2024-10-31 DIAGNOSIS — E11.65 TYPE 2 DIABETES MELLITUS WITH HYPERGLYCEMIA, WITH LONG-TERM CURRENT USE OF INSULIN: ICD-10-CM

## 2024-10-31 DIAGNOSIS — E03.9 ACQUIRED HYPOTHYROIDISM: Primary | ICD-10-CM

## 2024-11-04 ENCOUNTER — OFFICE VISIT (OUTPATIENT)
Dept: ENDOCRINOLOGY | Age: 44
End: 2024-11-04
Payer: COMMERCIAL

## 2024-11-04 VITALS
SYSTOLIC BLOOD PRESSURE: 122 MMHG | HEIGHT: 63 IN | DIASTOLIC BLOOD PRESSURE: 68 MMHG | WEIGHT: 210.8 LBS | TEMPERATURE: 98.2 F | BODY MASS INDEX: 37.35 KG/M2

## 2024-11-04 DIAGNOSIS — E78.2 HYPERLIPEMIA, MIXED: ICD-10-CM

## 2024-11-04 DIAGNOSIS — E03.9 ACQUIRED HYPOTHYROIDISM: ICD-10-CM

## 2024-11-04 DIAGNOSIS — E66.812 CLASS 2 SEVERE OBESITY DUE TO EXCESS CALORIES WITH SERIOUS COMORBIDITY AND BODY MASS INDEX (BMI) OF 37.0 TO 37.9 IN ADULT: ICD-10-CM

## 2024-11-04 DIAGNOSIS — E66.01 CLASS 2 SEVERE OBESITY DUE TO EXCESS CALORIES WITH SERIOUS COMORBIDITY AND BODY MASS INDEX (BMI) OF 37.0 TO 37.9 IN ADULT: ICD-10-CM

## 2024-11-04 DIAGNOSIS — E11.65 TYPE 2 DIABETES MELLITUS WITH HYPERGLYCEMIA, WITH LONG-TERM CURRENT USE OF INSULIN: Primary | ICD-10-CM

## 2024-11-04 DIAGNOSIS — Z79.4 TYPE 2 DIABETES MELLITUS WITH HYPERGLYCEMIA, WITH LONG-TERM CURRENT USE OF INSULIN: Primary | ICD-10-CM

## 2024-11-04 PROCEDURE — 99214 OFFICE O/P EST MOD 30 MIN: CPT | Performed by: NURSE PRACTITIONER

## 2024-11-04 PROCEDURE — 95251 CONT GLUC MNTR ANALYSIS I&R: CPT | Performed by: NURSE PRACTITIONER

## 2024-11-04 RX ORDER — INSULIN ASPART 100 [IU]/ML
100 INJECTION, SOLUTION INTRAVENOUS; SUBCUTANEOUS DAILY
Qty: 90 ML | Refills: 0 | Status: SHIPPED | OUTPATIENT
Start: 2024-11-04

## 2024-11-04 RX ORDER — DAPAGLIFLOZIN AND METFORMIN HYDROCHLORIDE 5; 1000 MG/1; MG/1
1 TABLET, FILM COATED, EXTENDED RELEASE ORAL 2 TIMES DAILY
Qty: 180 TABLET | Refills: 0 | Status: SHIPPED | OUTPATIENT
Start: 2024-11-04

## 2024-11-04 RX ORDER — INSULIN PMP CART,AUT,G6/7,CNTR
1 EACH SUBCUTANEOUS
Qty: 45 EACH | Refills: 0 | Status: SHIPPED | OUTPATIENT
Start: 2024-11-04

## 2024-11-04 NOTE — PROGRESS NOTES
"Chief Complaint  Diabetes    Subjective        Cora Gutiérrez presents to [facility] ENDOCRINOLOGY  History of Present Illness    Type 2 dm diagnosed April 2015, insulin started at that time   Dm regimen: omnipod 5 with novolog, xigduo 5-1000 BID, mounjaro 7.5mg weekly  Backup plan: insulin pens  Glucagon: baqsimi  Eyes: \" its been a while\", planning to go in Jan 2025 when HSA is renewed   CV: rosuvastatin 40mg QD    Hypothyroid: Synthroid 175mcg daily  Down 9 pounds since last visit 4/2024       Cgm review 10/22/2024- 11/4/24  77% time in range  0% low  23% high  Gmi n/a  Average glucose 162  64% automated mode  67% basal  33% bolus  TDD 30u        Objective   Vital Signs:  /68   Temp 98.2 °F (36.8 °C) (Oral)   Ht 160 cm (62.99\")   Wt 95.6 kg (210 lb 12.8 oz)   BMI 37.35 kg/m²   Estimated body mass index is 37.35 kg/m² as calculated from the following:    Height as of this encounter: 160 cm (62.99\").    Weight as of this encounter: 95.6 kg (210 lb 12.8 oz).          Physical Exam  Vitals reviewed.   Constitutional:       General: She is not in acute distress.  HENT:      Head: Normocephalic and atraumatic.   Cardiovascular:      Rate and Rhythm: Normal rate.   Pulmonary:      Effort: Pulmonary effort is normal. No respiratory distress.   Musculoskeletal:         General: No signs of injury. Normal range of motion.      Cervical back: Normal range of motion and neck supple.   Skin:     General: Skin is warm and dry.   Neurological:      Mental Status: She is alert and oriented to person, place, and time. Mental status is at baseline.   Psychiatric:         Mood and Affect: Mood normal.         Behavior: Behavior normal.         Thought Content: Thought content normal.         Judgment: Judgment normal.        Result Review :  The following data was reviewed by: PORSHA Shaw on 11/04/2024:  Common labs          4/4/2024    08:36   Common Labs   Glucose 120    BUN 13    Creatinine " 0.81    Sodium 139    Potassium 4.1    Chloride 102    Calcium 9.7    Total Protein 7.2    Albumin 4.1    Total Bilirubin 0.3    Alkaline Phosphatase 52    AST (SGOT) 32    ALT (SGPT) 35    Total Cholesterol 462    Triglycerides 1,751    HDL Cholesterol 25    LDL Cholesterol  Comment    Hemoglobin A1C 7.60                Assessment and Plan   Diagnoses and all orders for this visit:    1. Type 2 diabetes mellitus with hyperglycemia, with long-term current use of insulin (Primary)  -     Tirzepatide 10 MG/0.5ML solution auto-injector; Inject 10 mg under the skin into the appropriate area as directed 1 (One) Time Per Week.  Dispense: 6 mL; Refill: 0  -     dapagliflozin-metformin HCl ER (Xigduo XR) 5-1000 MG tablet; Take 1 tablet by mouth 2 (Two) Times a Day.  Dispense: 180 tablet; Refill: 0  -     Insulin Aspart (NovoLOG) 100 UNIT/ML injection; Use 100 units daily via Omnipod as directed.  Dispense: 90 mL; Refill: 0  -     Insulin Disposable Pump (Omnipod 5 [id number] Pods Gen 5) misc; 1 each by Other route Every Other Day.  Dispense: 45 each; Refill: 0  -     Lipid Panel  -     Hemoglobin A1c  -     Comprehensive Metabolic Panel  -     Microalbumin / Creatinine Urine Ratio - Urine, Clean Catch  -     TSH    2. Acquired hypothyroidism    3. Hyperlipemia, mixed    4. Class 2 severe obesity due to excess calories with serious comorbidity and body mass index (BMI) of 37.0 to 37.9 in adult             Follow Up   Return in about 6 months (around 5/4/2025).    Labs today  Cgm reviewed, near goal  Increase mounjaro to 10mg weekly  Ensure thyroid labs stable   Continue statin     Patient was given instructions and counseling regarding her condition or for health maintenance advice. Please see specific information pulled into the AVS if appropriate.     Thais Wheatley, APRN

## 2024-11-05 DIAGNOSIS — E03.9 ACQUIRED HYPOTHYROIDISM: ICD-10-CM

## 2024-11-05 LAB
ALBUMIN SERPL-MCNC: 4.6 G/DL (ref 3.9–4.9)
ALBUMIN/CREAT UR: 17 MG/G CREAT (ref 0–29)
ALP SERPL-CCNC: 51 IU/L (ref 44–121)
ALT SERPL-CCNC: 14 IU/L (ref 0–32)
AST SERPL-CCNC: 18 IU/L (ref 0–40)
BILIRUB SERPL-MCNC: 0.3 MG/DL (ref 0–1.2)
BUN SERPL-MCNC: 16 MG/DL (ref 6–24)
BUN/CREAT SERPL: 19 (ref 9–23)
CALCIUM SERPL-MCNC: 9.3 MG/DL (ref 8.7–10.2)
CHLORIDE SERPL-SCNC: 105 MMOL/L (ref 96–106)
CHOLEST SERPL-MCNC: 187 MG/DL (ref 100–199)
CO2 SERPL-SCNC: 21 MMOL/L (ref 20–29)
CREAT SERPL-MCNC: 0.83 MG/DL (ref 0.57–1)
CREAT UR-MCNC: 121.3 MG/DL
EGFRCR SERPLBLD CKD-EPI 2021: 90 ML/MIN/1.73
GLOBULIN SER CALC-MCNC: 2.9 G/DL (ref 1.5–4.5)
GLUCOSE SERPL-MCNC: 81 MG/DL (ref 70–99)
HBA1C MFR BLD: 7.6 % (ref 4.8–5.6)
HDLC SERPL-MCNC: 46 MG/DL
IMP & REVIEW OF LAB RESULTS: NORMAL
LDLC SERPL CALC-MCNC: 94 MG/DL (ref 0–99)
MICROALBUMIN UR-MCNC: 20.4 UG/ML
POTASSIUM SERPL-SCNC: 4.1 MMOL/L (ref 3.5–5.2)
PROT SERPL-MCNC: 7.5 G/DL (ref 6–8.5)
SODIUM SERPL-SCNC: 140 MMOL/L (ref 134–144)
TRIGL SERPL-MCNC: 278 MG/DL (ref 0–149)
TSH SERPL DL<=0.005 MIU/L-ACNC: 15.4 UIU/ML (ref 0.45–4.5)
VLDLC SERPL CALC-MCNC: 47 MG/DL (ref 5–40)

## 2024-11-05 RX ORDER — LEVOTHYROXINE SODIUM 175 MCG
TABLET ORAL
Start: 2024-11-05

## 2024-11-07 ENCOUNTER — SPECIALTY PHARMACY (OUTPATIENT)
Dept: ENDOCRINOLOGY | Age: 44
End: 2024-11-07
Payer: COMMERCIAL

## 2024-11-07 NOTE — PROGRESS NOTES
" Specialty Pharmacy Patient Management Program  Endocrinology Refill Outreach      Cora \"Cora Gutiérrez\" is a 44 y.o. female contacted today regarding refills of her medication(s).    Specialty medication(s) and dose(s) confirmed: Mounjaro 10 mg     Refill Questions      Flowsheet Row Most Recent Value   Changes to allergies? No   Changes to medications? Yes  [Mounjaro increased to 10 mg]   New conditions or infections since last clinic visit No   Unplanned office visit, urgent care, ED, or hospital admission in the last 4 weeks  No   How does patient/caregiver feel medication is working? Good   Financial problems or insurance changes  No   Since the previous refill, were any specialty medication doses or scheduled injections missed or delayed?  No   Does this patient require a clinical escalation to a pharmacist? No          Delivery Questions      Flowsheet Row Most Recent Value   Delivery method UPS   Delivery address verified with patient/caregiver? Yes   Delivery address Home   Number of medications in delivery 1   Medication(s) being filled and delivered Tirzepatide (MOUNJARO)   Doses left of specialty medications New dose   Copay verified? Yes   Copay amount $25   Copay form of payment Credit/debit on file   Ship Date 11/7   Delivery Date 11/8   Signature Required No            Follow-Up: About a month for future fills    Donna Syed, Kanika, MM   Clinical Speciality Pharmacist, Endocrinology   11/7/2024  11:56 EST    "

## 2024-11-15 NOTE — TELEPHONE ENCOUNTER
Specialty Pharmacy Patient Management Program  Prescription Refill Request     Patient currently fills medications at  Pharmacy. Needing refill(s) on the following:      Requested Prescriptions     Pending Prescriptions Disp Refills    Continuous Glucose Sensor (Dexcom G6 Sensor) 9 each 0     Sig: Apply  topically Every 10 (Ten) Days for 90 days.    Continuous Glucose Transmitter (Dexcom G6 Transmitter) misc 1 each 0     Sig: Use as Directed and Replace Transmitter Every 3 Months       Last visit: 11/4/24  Next visit: 5/5/25    Pended for PORSHA Shaw to review, and approve if appropriate.       Radha Rowland, PharmD  Clinical Specialty Pharmacist, Endocrinology  11/15/2024  16:29 EST

## 2024-11-18 RX ORDER — PROCHLORPERAZINE 25 MG/1
SUPPOSITORY RECTAL
Qty: 9 EACH | Refills: 0 | Status: SHIPPED | OUTPATIENT
Start: 2024-11-18 | End: 2024-11-21

## 2024-11-18 RX ORDER — PROCHLORPERAZINE 25 MG/1
1 SUPPOSITORY RECTAL
Qty: 1 EACH | Refills: 0 | Status: SHIPPED | OUTPATIENT
Start: 2024-11-18 | End: 2024-11-21

## 2024-11-21 ENCOUNTER — SPECIALTY PHARMACY (OUTPATIENT)
Dept: ENDOCRINOLOGY | Age: 44
End: 2024-11-21
Payer: COMMERCIAL

## 2024-11-21 RX ORDER — ACYCLOVIR 400 MG/1
1 TABLET ORAL
Qty: 3 EACH | Refills: 2 | Status: SHIPPED | OUTPATIENT
Start: 2024-11-21

## 2024-11-21 NOTE — TELEPHONE ENCOUNTER
Specialty Pharmacy Patient Management Program  Prescription Refill Request     Patient currently fills medications at  Pharmacy. Needing refill(s) on the following:      Requested Prescriptions     Pending Prescriptions Disp Refills    Continuous Glucose Sensor (Dexcom G7 Sensor) misc 3 each 2     Sig: Use 1 each Every 10 (Ten) Days.       Last visit: 11/4/24  Next visit: 5/5/25    Pended for PORSHA Shaw to review, and approve if appropriate.       Radha Rowland, PharmD  Clinical Specialty Pharmacist, Endocrinology  11/21/2024  15:59 EST

## 2024-11-21 NOTE — PROGRESS NOTES
" Specialty Pharmacy Patient Management Program  Endocrinology Refill Outreach      Cora \"Cora Gutiérrez\" is a 44 y.o. female contacted today regarding refills of her medication(s).    Specialty medication(s) and dose(s) confirmed: omnipod and sensors    Refill Questions      Flowsheet Row Most Recent Value   Changes to allergies? No   Changes to medications? Yes  [switching from dexcom g6 to dexcom g7- pharmacist and provider discussed with pt.]   New conditions or infections since last clinic visit No   Unplanned office visit, urgent care, ED, or hospital admission in the last 4 weeks  No   How does patient/caregiver feel medication is working? Very good   Financial problems or insurance changes  No   Since the previous refill, were any specialty medication doses or scheduled injections missed or delayed?  No   Does this patient require a clinical escalation to a pharmacist? No          Delivery Questions      Flowsheet Row Most Recent Value   Delivery method UPS   Delivery address verified with patient/caregiver? Yes   Delivery address Home   Number of medications in delivery 2   Medication(s) being filled and delivered Insulin Disposable Pump (Omnipod 5 [id number] Pods Gen 5), Continuous Glucose Sensor (Dexcom G7 Sensor)   Copay verified? Yes   Copay amount 0   Copay form of payment No copayment ($0)   Ship Date 11/25   Delivery Date 11/26   Signature Required No            Follow-Up: 30 days    Radha Rowland, PharmD  Clinical Specialty Pharmacist, Endocrinology  11/21/2024  16:23 EST       "

## 2024-11-21 NOTE — PROGRESS NOTES
Specialty Pharmacy       Cora Gutiérrez is a 44 y.o. female seen by an Endocrinology provider for Type 2 Diabetes.    Benefits Investigation Summary    Prescription: dexcom g7    Dispensing pharmacy: [facility]      PLAN: [facility]  BIN: [id number]   PCN: [id number]   RX GROUP: [id number]    Prior Auth and Med Assistance notes: Initiated Dexcom g7 PA in CMM: Key: [id number]. Awaiting a response    PA approved    Brenda ShoreD  Clinical Specialty Pharmacist, Endocrinology  11/21/2024  16:25 EST

## 2024-12-03 ENCOUNTER — SPECIALTY PHARMACY (OUTPATIENT)
Dept: ENDOCRINOLOGY | Age: 44
End: 2024-12-03
Payer: COMMERCIAL

## 2024-12-03 NOTE — PROGRESS NOTES
"Specialty Pharmacy Refill Coordination Note     Cora \"Cora Gutiérrez\" is a 44 y.o. female contacted today regarding refills of  3 specialty medication(s).    Reviewed and verified with patient:       Specialty medication(s) and dose(s) confirmed: yes    Refill Questions      Flowsheet Row Most Recent Value   Changes to allergies? No   Changes to medications? No   New conditions or infections since last clinic visit No   Unplanned office visit, urgent care, ED, or hospital admission in the last 4 weeks  No   How does patient/caregiver feel medication is working? Good   Financial problems or insurance changes  No   Since the previous refill, were any specialty medication doses or scheduled injections missed or delayed?  No   Does this patient require a clinical escalation to a pharmacist? No            Delivery Questions      Flowsheet Row Most Recent Value   Delivery method UPS   Delivery address verified with patient/caregiver? Yes   Delivery address Home   Number of medications in delivery 3   Medication(s) being filled and delivered Dapagliflozin Prop-metFORMIN (XIGDUO XR), Rosuvastatin Calcium (CRESTOR), Tirzepatide   Doses left of specialty medications 1 week   Copay verified? Yes   Copay amount $61   Copay form of payment Credit/debit on file   Ship Date 12/3   Delivery Date 12/4   Signature Required No              Medication Adherence    Adherence tools used: patient uses a pill box to manage medications  Support network for adherence: family member, healthcare provider          Follow-up: 23 day(s)     Thais Lees, Pharmacy Technician  Specialty Pharmacy Technician        "

## 2024-12-03 NOTE — PROGRESS NOTES
"Specialty Pharmacy Refill Coordination Note     Cora \"Cora Gutiérrez\" is a 44 y.o. female contacted today regarding refills of  3 specialty medication(s).    Reviewed and verified with patient:       Specialty medication(s) and dose(s) confirmed: yes    Refill Questions      Flowsheet Row Most Recent Value   Changes to allergies? No   Changes to medications? No   New conditions or infections since last clinic visit No   Unplanned office visit, urgent care, ED, or hospital admission in the last 4 weeks  No   How does patient/caregiver feel medication is working? Good   Financial problems or insurance changes  No   Since the previous refill, were any specialty medication doses or scheduled injections missed or delayed?  No   Does this patient require a clinical escalation to a pharmacist? No            Delivery Questions      Flowsheet Row Most Recent Value   Delivery method UPS   Delivery address verified with patient/caregiver? Yes   Delivery address Home   Number of medications in delivery 3   Medication(s) being filled and delivered Tirzepatide, Rosuvastatin Calcium (CRESTOR), Dapagliflozin Prop-metFORMIN (XIGDUO XR)   Doses left of specialty medications 1 week   Copay verified? Yes   Copay amount $61   Copay form of payment Credit/debit on file   Ship Date 12/3   Delivery Date 12/4   Signature Required No              Medication Adherence    Adherence tools used: patient uses a pill box to manage medications  Support network for adherence: family member, healthcare provider          Follow-up: 23 day(s)     Thais Lees, Pharmacy Technician  Specialty Pharmacy Technician        "

## 2024-12-20 ENCOUNTER — SPECIALTY PHARMACY (OUTPATIENT)
Dept: ENDOCRINOLOGY | Age: 44
End: 2024-12-20
Payer: COMMERCIAL

## 2024-12-20 NOTE — PROGRESS NOTES
"Specialty Pharmacy Refill Coordination Note     Cora \"Cora Gutiérrez\" is a 44 y.o. female contacted today regarding refills of  4 specialty medication(s).    Reviewed and verified with patient:       Specialty medication(s) and dose(s) confirmed: yes    Refill Questions      Flowsheet Row Most Recent Value   Changes to allergies? No   Changes to medications? No   New conditions or infections since last clinic visit No   Unplanned office visit, urgent care, ED, or hospital admission in the last 4 weeks  No   How does patient/caregiver feel medication is working? Very good   Financial problems or insurance changes  No   Since the previous refill, were any specialty medication doses or scheduled injections missed or delayed?  No   Does this patient require a clinical escalation to a pharmacist? No            Delivery Questions      Flowsheet Row Most Recent Value   Delivery method UPS   Delivery address verified with patient/caregiver? Yes   Delivery address Home   Number of medications in delivery 4   Medication(s) being filled and delivered Continuous Glucose Sensor (Dexcom G7 Sensor), Insulin Disposable Pump (Omnipod 5 [id number] Pods Gen 5), Insulin Aspart (novoLOG), Glucagon (Baqsimi Two Pack)   Doses left of specialty medications 1 week   Copay verified? Yes   Copay amount $25.00   Copay form of payment Credit/debit on file   Ship Date 12/23   Delivery Date 12/24   Signature Required No              Medication Adherence    Adherence tools used: patient uses a pill box to manage medications  Support network for adherence: family member, healthcare provider          Follow-up: 23 day(s)     Thais Lees, Pharmacy Technician  Specialty Pharmacy Technician        "

## 2024-12-26 ENCOUNTER — SPECIALTY PHARMACY (OUTPATIENT)
Dept: ENDOCRINOLOGY | Age: 44
End: 2024-12-26
Payer: COMMERCIAL

## 2024-12-26 NOTE — PROGRESS NOTES
"Specialty Pharmacy Refill Coordination Note     Cora \"Cora Gutiérrez\" is a 44 y.o. female contacted today regarding refills of  3 specialty medication(s).    Reviewed and verified with patient:       Specialty medication(s) and dose(s) confirmed: yes    Refill Questions      Flowsheet Row Most Recent Value   Changes to allergies? No   Changes to medications? No   New conditions or infections since last clinic visit No   Unplanned office visit, urgent care, ED, or hospital admission in the last 4 weeks  No   How does patient/caregiver feel medication is working? Good   Financial problems or insurance changes  No   Since the previous refill, were any specialty medication doses or scheduled injections missed or delayed?  No   Does this patient require a clinical escalation to a pharmacist? No            Delivery Questions      Flowsheet Row Most Recent Value   Delivery method UPS   Delivery address verified with patient/caregiver? Yes   Delivery address Home   Number of medications in delivery 3   Medication(s) being filled and delivered Icosapent Ethyl (VASCEPA), Tirzepatide, Dapagliflozin Prop-metFORMIN (XIGDUO XR)   Doses left of specialty medications 1 week   Copay verified? Yes   Copay amount $70.00   Copay form of payment Credit/debit on file   Ship Date 12/30   Delivery Date 12/31   Signature Required No              Medication Adherence    Adherence tools used: patient uses a pill box to manage medications  Support network for adherence: family member, healthcare provider          Follow-up: 23 day(s)     Thais Lees, Pharmacy Technician  Specialty Pharmacy Technician        "

## 2024-12-26 NOTE — PROGRESS NOTES
"Specialty Pharmacy Refill Coordination Note     Cora \"Cora Gutiérrez\" is a 44 y.o. female contacted today regarding refills of  3 specialty medication(s).    Reviewed and verified with patient:       Specialty medication(s) and dose(s) confirmed: yes    Refill Questions      Flowsheet Row Most Recent Value   Changes to allergies? No   Changes to medications? No   New conditions or infections since last clinic visit No   Unplanned office visit, urgent care, ED, or hospital admission in the last 4 weeks  No   How does patient/caregiver feel medication is working? Good   Financial problems or insurance changes  No   Since the previous refill, were any specialty medication doses or scheduled injections missed or delayed?  No   Does this patient require a clinical escalation to a pharmacist? No            Delivery Questions      Flowsheet Row Most Recent Value   Delivery method UPS   Delivery address verified with patient/caregiver? Yes   Delivery address Home   Number of medications in delivery 3   Medication(s) being filled and delivered Tirzepatide, Dapagliflozin Prop-metFORMIN (XIGDUO XR), Icosapent Ethyl (VASCEPA)   Doses left of specialty medications 1 week   Copay verified? Yes   Copay amount $70.00   Copay form of payment Credit/debit on file   Ship Date 12/30   Delivery Date 12/31   Signature Required No              Medication Adherence    Adherence tools used: patient uses a pill box to manage medications  Support network for adherence: family member, healthcare provider          Follow-up: 23 day(s)     Thais Lees, Pharmacy Technician  Specialty Pharmacy Technician        "

## 2024-12-26 NOTE — TELEPHONE ENCOUNTER
Specialty Pharmacy Patient Management Program  Prescription Refill Request     Patient currently fills medications at  Pharmacy. Needing refill(s) on the following:      Requested Prescriptions     Pending Prescriptions Disp Refills    rosuvastatin (CRESTOR) 40 MG tablet 90 tablet 0     Sig: Take 1 tablet by mouth Daily.       Last visit: 11/4/24  Next visit: 5/5/25    Pended for PORSHA Shaw to review, and approve if appropriate.       Radha Rowland, PharmD  Clinical Specialty Pharmacist, Endocrinology  12/26/2024  16:06 EST

## 2024-12-27 RX ORDER — ROSUVASTATIN CALCIUM 40 MG/1
40 TABLET, COATED ORAL DAILY
Qty: 90 TABLET | Refills: 1 | Status: SHIPPED | OUTPATIENT
Start: 2024-12-27

## 2025-01-22 DIAGNOSIS — Z79.4 TYPE 2 DIABETES MELLITUS WITH HYPERGLYCEMIA, WITH LONG-TERM CURRENT USE OF INSULIN: ICD-10-CM

## 2025-01-22 DIAGNOSIS — E11.65 TYPE 2 DIABETES MELLITUS WITH HYPERGLYCEMIA, WITH LONG-TERM CURRENT USE OF INSULIN: ICD-10-CM

## 2025-01-22 NOTE — TELEPHONE ENCOUNTER
Specialty Pharmacy Patient Management Program  Prescription Refill Request     Patient currently fills medications at  Pharmacy. Needing refill(s) on the following:      Requested Prescriptions     Pending Prescriptions Disp Refills    Tirzepatide 10 MG/0.5ML solution auto-injector 6 mL 1     Sig: Inject 10 mg under the skin into the appropriate area as directed 1 (One) Time Per Week.       Last visit: 11/04/24    Next visit: 05/05/25    Pended for PORSHA Shaw to review, and approve if appropriate.     Tomeka Constantino, PharmD, BCACP, BC-ADM, [facility]  Clinical Specialty Pharmacist, Endocrinology  1/22/2025  14:50 EST

## 2025-01-22 NOTE — TELEPHONE ENCOUNTER
Rx Refill Note  Requested Prescriptions     Pending Prescriptions Disp Refills    Tirzepatide 10 MG/0.5ML solution auto-injector 6 mL 1     Sig: Inject 10 mg under the skin into the appropriate area as directed 1 (One) Time Per Week.      Last office visit with prescribing clinician: Visit date not found   Last telemedicine visit with prescribing clinician: Visit date not found   Next office visit with prescribing clinician: Visit date not found                         Would you like a call back once the refill request has been completed: [] Yes [] No    If the office needs to give you a call back, can they leave a voicemail: [] Yes [] No    Ramona Webb  01/22/25, 14:59 EST

## 2025-01-24 ENCOUNTER — SPECIALTY PHARMACY (OUTPATIENT)
Dept: ENDOCRINOLOGY | Age: 45
End: 2025-01-24
Payer: COMMERCIAL

## 2025-01-24 NOTE — PROGRESS NOTES
"Specialty Pharmacy Refill Coordination Note     Cora \"Cora Gutiérrez\" is a 44 y.o. female contacted today regarding refills of  5 specialty medication(s).    Reviewed and verified with patient:       Specialty medication(s) and dose(s) confirmed: yes    Refill Questions      Flowsheet Row Most Recent Value   Changes to allergies? No   Changes to medications? No   New conditions or infections since last clinic visit No   Unplanned office visit, urgent care, ED, or hospital admission in the last 4 weeks  No   How does patient/caregiver feel medication is working? Good   Financial problems or insurance changes  No   Since the previous refill, were any specialty medication doses or scheduled injections missed or delayed?  No   Does this patient require a clinical escalation to a pharmacist? No            Delivery Questions      Flowsheet Row Most Recent Value   Delivery method UPS   Delivery address verified with patient/caregiver? Yes   Delivery address Home   Number of medications in delivery 5   Medication(s) being filled and delivered Continuous Glucose Sensor (Dexcom G7 Sensor), Insulin Disposable Pump (Omnipod 5 [id number] Pods Gen 5), Icosapent Ethyl (VASCEPA), Dapagliflozin Prop-metFORMIN (XIGDUO XR), Tirzepatide   Doses left of specialty medications 1 week   Copay verified? Yes   Copay amount $45.00   Copay form of payment Credit/debit on file   Ship Date 01/27   Delivery Date Selection 01/28/25   Signature Required No              Medication Adherence    Adherence tools used: patient uses a pill box to manage medications  Support network for adherence: family member, healthcare provider          Follow-up: 23 day(s)     Thais Lees, Pharmacy Technician  Specialty Pharmacy Technician        "

## 2025-02-03 RX ORDER — NORGESTIMATE AND ETHINYL ESTRADIOL 7DAYSX3 28
1 KIT ORAL DAILY
Qty: 84 TABLET | Refills: 3 | OUTPATIENT
Start: 2025-02-03

## 2025-02-18 DIAGNOSIS — E11.65 TYPE 2 DIABETES MELLITUS WITH HYPERGLYCEMIA, WITH LONG-TERM CURRENT USE OF INSULIN: ICD-10-CM

## 2025-02-18 DIAGNOSIS — Z79.4 TYPE 2 DIABETES MELLITUS WITH HYPERGLYCEMIA, WITH LONG-TERM CURRENT USE OF INSULIN: ICD-10-CM

## 2025-02-18 RX ORDER — DAPAGLIFLOZIN AND METFORMIN HYDROCHLORIDE 5; 1000 MG/1; MG/1
1 TABLET, FILM COATED, EXTENDED RELEASE ORAL 2 TIMES DAILY
Qty: 180 TABLET | Refills: 0 | Status: SHIPPED | OUTPATIENT
Start: 2025-02-18

## 2025-02-18 RX ORDER — INSULIN PMP CART,AUT,G6/7,CNTR
1 EACH SUBCUTANEOUS
Qty: 45 EACH | Refills: 0 | Status: SHIPPED | OUTPATIENT
Start: 2025-02-18

## 2025-02-18 RX ORDER — ACYCLOVIR 400 MG/1
1 TABLET ORAL
Qty: 9 EACH | Refills: 0 | Status: SHIPPED | OUTPATIENT
Start: 2025-02-18

## 2025-02-18 NOTE — TELEPHONE ENCOUNTER
Specialty Pharmacy Patient Management Program       Cora Gutiérrez is a 45 y.o. female seen by an Endocrinology provider for Type 2 Diabetes and enrolled in the Endocrinology Patient Management program offered by [facility].      Requested Prescriptions     Pending Prescriptions Disp Refills    Continuous Glucose Sensor (Dexcom G7 Sensor) misc 90 each 0     Sig: Use 1 each Every 10 (Ten) Days.    dapagliflozin-metformin HCl ER (Xigduo XR) 5-1000 MG tablet 180 tablet 0     Sig: Take 1 tablet by mouth 2 (Two) Times a Day.    Insulin Disposable Pump (Omnipod 5 [id number] Pods Gen 5) misc 45 each 0     Sig: Use 1 pod Every Other Day.       Refill sent in to patient's pharmacy for above medication prescribed pending provider approval by Thais Wheatley.   Last office visit 11/4/2024.  Next visit scheduled 5/5/2025.      Chandan Clemente, PharmD, MPH  Clinical Specialty Pharmacist, Endocrinology  2/18/2025  16:10 EST

## 2025-02-20 ENCOUNTER — SPECIALTY PHARMACY (OUTPATIENT)
Dept: ENDOCRINOLOGY | Age: 45
End: 2025-02-20
Payer: COMMERCIAL

## 2025-02-20 NOTE — PROGRESS NOTES
" Specialty Pharmacy Patient Management Program  Endocrinology Refill Outreach      Cora \"Cora Gutiérrez\" is a 45 y.o. female contacted today regarding refills of her medication(s).    Specialty medication(s) and dose(s) confirmed: Vascepa 4g/day, Mounjaro 10mg/week, Xigduo XR 10/2000mg/day     Refill Questions      Flowsheet Row Most Recent Value   Changes to allergies? No   Changes to medications? No   New conditions or infections since last clinic visit No   Unplanned office visit, urgent care, ED, or hospital admission in the last 4 weeks  No   How does patient/caregiver feel medication is working? Very good   Financial problems or insurance changes  No   Since the previous refill, were any specialty medication doses or scheduled injections missed or delayed?  No   Does this patient require a clinical escalation to a pharmacist? No          Delivery Questions      Flowsheet Row Most Recent Value   Delivery method UPS   Delivery address verified with patient/caregiver? Yes   Delivery address Home   Number of medications in delivery 6   Medication(s) being filled and delivered Icosapent Ethyl (VASCEPA), Dapagliflozin Prop-metFORMIN (XIGDUO XR), Insulin Aspart (novoLOG), Insulin Disposable Pump (Omnipod 5 [id number] Pods Gen 5), Continuous Glucose Sensor (Dexcom G7 Sensor), Tirzepatide   Copay verified? Yes   Copay amount $95.00   Copay form of payment Credit/debit on file   Delivery Date Selection 02/25/25   Signature Required No            Follow-Up: 21 days     Tomeka Constantino, PharmD, BCACP, BC-ADM, [facility]  Clinical Specialty Pharmacist, Endocrinology  2/20/2025  16:31 EST    "

## 2025-02-20 NOTE — PROGRESS NOTES
Specialty Pharmacy Patient Management Program  Endocrinology Reassessment     Cora Gutiérrez was referred by an Endocrinology provider to the Endocrinology Patient Management program offered by [facility] for Type 2 Diabetes and Hyperlipidemia. A follow-up outreach was conducted, including assessment of continued therapy appropriateness, medication adherence, and side effect incidence and management for Mounjaro, Dexcom G7 sensors, Novolog, Omnipod 5, Vascepa, and Xigduo.    Changes to Insurance Coverage or Financial Support  No change     Relevant Past Medical History and Comorbidities  Relevant medical history and concomitant health conditions were discussed with the patient. The patient's chart has been reviewed for relevant past medical history and comorbid health conditions and updated as necessary.   Past Medical History:   Diagnosis Date    BMI 40.0-44.9, adult     Diabetes mellitus     Fatty liver     H/O Pancreatitis, acute     Hyperlipidemia     Hypothyroidism     Left ovarian cyst     Type 2 diabetes mellitus      Social History     Socioeconomic History    Marital status:     Number of children: 0   Tobacco Use    Smoking status: Former     Current packs/day: 0.50     Average packs/day: 0.5 packs/day for 3.0 years (1.5 ttl pk-yrs)     Types: Cigarettes     Passive exposure: Never    Smokeless tobacco: Never    Tobacco comments:     quit cigarrettes 4/01/19, now ecig    Vaping Use    Vaping status: Every Day    Substances: Nicotine, Flavoring   Substance and Sexual Activity    Alcohol use: Yes     Comment: Not enough to even register that i drink    Drug use: No    Sexual activity: Yes     Partners: Male     Birth control/protection: OCP     Problem list reviewed by Tomeka Constantino, PharmD on 2/20/2025 at  4:23 PM    Hospitalizations and Urgent Care Since Last Assessment  ED Visits, Admissions, or Hospitalizations: none   Urgent Office Visits: none     Allergies  Known  allergies and reactions were discussed with the patient. The patient's chart has been reviewed for allergy information and updated as necessary.   Allergies   Allergen Reactions    Trulicity [Dulaglutide] GI Intolerance     Also h/o pancreatitis; avoid GLP-1 analogs          Relevant Laboratory Values  Relevant laboratory values were discussed with the patient. The following specialty medication dose adjustment(s) are recommended: A1c near goal and stable, TG not yet at goal but improving   A1C Last 3 Results          4/4/2024    08:36 11/4/2024    14:02   HGBA1C Last 3 Results   Hemoglobin A1C 7.60  7.6      Lab Results   Component Value Date    HGBA1C 7.6 (H) 11/04/2024     Lab Results   Component Value Date    GLUCOSE 81 11/04/2024    CALCIUM 9.3 11/04/2024     11/04/2024    K 4.1 11/04/2024    CO2 21 11/04/2024     11/04/2024    BUN 16 11/04/2024    CREATININE 0.83 11/04/2024    EGFRIFAFRI 99 01/19/2022    EGFRIFNONA 86 01/19/2022    BCR 19 11/04/2024    ANIONGAP 11 11/07/2021     Lab Results   Component Value Date    CHLPL 187 11/04/2024    TRIG 278 (H) 11/04/2024    HDL 46 11/04/2024    LDL 94 11/04/2024     Microalbumin          11/4/2024    14:02   Microalbumin   Microalbumin, Urine 20.4      Current Medication List  This medication list has been reviewed with the patient and evaluated for any interactions or necessary modifications/recommendations, and updated to include all prescription medications, OTC medications, and supplements the patient is currently taking.  This list reflects what is contained in the patient's profile, which has also been marked as reviewed to communicate to other providers it is the most up to date version of the patient's current medication therapy.     Current Outpatient Medications:     Continuous Glucose Sensor (Dexcom G7 Sensor) misc, Use 1 each Every 10 (Ten) Days., Disp: 9 each, Rfl: 0    dapagliflozin-metformin HCl ER (Xigduo XR) 5-1000 MG tablet, Take 1 tablet  by mouth 2 (Two) Times a Day., Disp: 180 tablet, Rfl: 0    escitalopram (LEXAPRO) 10 MG tablet, TAKE ONE TABLET BY MOUTH DAILY, Disp: 90 tablet, Rfl: 3    fenofibrate (TRICOR) 145 MG tablet, Take 1 tablet by mouth Daily., Disp: 90 tablet, Rfl: 0    icosapent ethyl (Vascepa) 1 g capsule capsule, Take 2 capsules (2g) by mouth 2 (Two) Times a Day With Meals., Disp: 360 capsule, Rfl: 1    insulin aspart (NovoLOG FlexPen) 100 UNIT/ML solution pen-injector sc pen, Inject 120 Units under the skin into the appropriate area as directed Daily. Up to 120u daily with pump, Disp: 45 mL, Rfl: 0    Insulin Disposable Pump (Omnipod 5 [id number] Pods Gen 5) misc, Use 1 pod Every Other Day., Disp: 45 each, Rfl: 0    lidocaine (LIDODERM) 5 %, Place 1 patch on the skin as directed by provider Daily As Needed (back pain)., Disp: , Rfl:     norgestimate-ethinyl estradiol (Tri-Sprintec) 0.18/0.215/0.25 MG-35 MCG per tablet, TAKE ONE TABLET BY MOUTH DAILY, Disp: 84 tablet, Rfl: 3    rosuvastatin (CRESTOR) 40 MG tablet, Take 1 tablet by mouth Daily., Disp: 90 tablet, Rfl: 1    Synthroid 175 MCG tablet, 1 tablet mon- sat and 2 tablets on every Sunday, Disp: , Rfl:     Tirzepatide 10 MG/0.5ML solution auto-injector, Inject 10 mg under the skin into the appropriate area as directed 1 (One) Time Per Week., Disp: 6 mL, Rfl: 0    Blood Glucose Monitoring Suppl (Accu-Chek Kristina Plus) w/Device kit, Use to check blood sugar 3 times a day., Disp: 1 kit, Rfl: 0    Glucagon (Baqsimi Two Pack) 3 MG/DOSE powder, Spray 3 mg into the nostril(s) as directed by provider As Needed for hypoglycemia on pump, Disp: 2 each, Rfl: 1    glucose blood (Accu-Chek SmartView) test strip, Check 4 times daily Use as instructed, Disp: 100 each, Rfl: 12    glucose blood test strip, Check Blood Sugar 3 (Three) Times a Day., Disp: 300 each, Rfl: 4    Insulin Aspart (NovoLOG) 100 UNIT/ML injection, Use 100 units daily via Omnipod as directed., Disp: 90 mL, Rfl: 0    Insulin Pen  Needle (BD Pen Needle Gina U/F) 32G X 4 MM misc, USE FIVE TIMES DAILY WITH INSULIN, Disp: 500 each, Rfl: 0    Medicines reviewed by Tomeka Constantino, PharmD on 2/20/2025 at  4:17 PM  Medicines reviewed by Tomeka Constantino, PharmD on 2/20/2025 at  4:23 PM    Drug Interactions  No Clinically Significant DDIs Were Identified at Present Time Upon Marking Medications Reviewed     Recommended Medications Assessment  Aspirin: Not Taking Currently  Statin: Currently Taking   ACEi/ARB: Not Taking Currently    Adverse Drug Reactions  Medication tolerability: Tolerating with no to minimal ADRs  Medication plan: Continue therapy with normal follow-up  Plan for ADR Management: N/A at this time.  Pt reports they are tolerating therapy well.     Adherence, Self-Administration, and Current Therapy Problems  Adherence related to the patient's specialty therapy was discussed with the patient. The Adherence segment of this outreach has been reviewed and updated.     Adherence Questions  Linked Medication(s) Assessed: Continuous Glucose Sensor (Dexcom G7 Sensor), Dapagliflozin Prop-metFORMIN (XIGDUO XR), Icosapent Ethyl (VASCEPA), Insulin Disposable Pump (Omnipod 5 [id number] Pods Gen 5), Tirzepatide  On average, how many doses/injections does the patient miss per month?: 0  What are the identified reasons for non-adherence or missed doses? : no problems identified  What is the estimated medication adherence level?: %  Based on the patient/caregiver response and refill history, does this patient require an MTP to track adherence improvements?: no    Additional Barriers to Patient Self-Administration: No known barriers at this time   Methods for Supporting Patient Self-Administration: none     Open Medication Therapy Problems  No medication therapy recommendations to display    Goals of Therapy  Goals related to the patient's specialty therapy were discussed with the patient. The Patient Goals segment of this outreach has been reviewed  and updated.   Goals Addressed Today    None         Quality of Life Assessment   Quality of Life related to the patient's enrollment in the patient management program and services provided was discussed with the patient. The QOL segment of this outreach has been reviewed and updated.  Quality of Life Improvement Scale: 8-Moderately better    Reassessment Plan & Follow-Up  1. Medication Therapy Changes: no changes   2. Related Plans, Therapy Recommendations, or Issues to Be Addressed: none   3. Pharmacist to perform regular assessments no more than (6) months from the previous assessment.  4. Care Coordinator to set up future refill outreaches, coordinate prescription delivery, and escalate clinical questions to pharmacist.    Attestation  Therapeutic appropriateness: Appropriate   I attest the patient was actively involved in and has agreed to the above plan of care.  If the prescribed therapy is at any point deemed not appropriate based on the current or future assessments, a consultation will be initiated with the patient's specialty care provider to determine the best course of action. The revised plan of therapy will be documented along with any required assessments and/or additional patient education provided.     Tomeka Constantino, PharmD, BCACP, BC-ADM, [facility]  Clinical Specialty Pharmacist, Endocrinology  2/20/2025  16:24 EST    Discussed the aforementioned information with the patient via Telephone.

## 2025-03-05 DIAGNOSIS — F32.A DEPRESSION, UNSPECIFIED DEPRESSION TYPE: ICD-10-CM

## 2025-03-06 RX ORDER — NORGESTIMATE AND ETHINYL ESTRADIOL 7DAYSX3 28
1 KIT ORAL DAILY
Qty: 84 TABLET | Refills: 3 | OUTPATIENT
Start: 2025-03-06

## 2025-03-06 RX ORDER — ESCITALOPRAM OXALATE 10 MG/1
10 TABLET ORAL DAILY
Qty: 90 TABLET | Refills: 3 | OUTPATIENT
Start: 2025-03-06

## 2025-03-06 NOTE — TELEPHONE ENCOUNTER
Refill refused, last in-office appointment 11/30/22; last telemedicine appointment 1/10/24. Patient needs appointment.

## 2025-03-12 DIAGNOSIS — F32.A DEPRESSION, UNSPECIFIED DEPRESSION TYPE: ICD-10-CM

## 2025-03-12 RX ORDER — ESCITALOPRAM OXALATE 10 MG/1
10 TABLET ORAL DAILY
Qty: 90 TABLET | Refills: 3 | OUTPATIENT
Start: 2025-03-12

## 2025-03-14 DIAGNOSIS — F32.A DEPRESSION, UNSPECIFIED DEPRESSION TYPE: ICD-10-CM

## 2025-03-14 RX ORDER — ESCITALOPRAM OXALATE 10 MG/1
10 TABLET ORAL DAILY
Qty: 90 TABLET | Refills: 3 | Status: CANCELLED | OUTPATIENT
Start: 2025-03-14

## 2025-03-14 RX ORDER — NORGESTIMATE AND ETHINYL ESTRADIOL 7DAYSX3 28
1 KIT ORAL DAILY
Qty: 28 TABLET | Refills: 0 | Status: SHIPPED | OUTPATIENT
Start: 2025-03-14

## 2025-03-14 NOTE — TELEPHONE ENCOUNTER
LAST REFILL - 02/08/24  LAST VISIT - 11/30/2022 in office; 1/10/24 telemedicine  NEXT VISIT - 03/18/25    Patient will have enough escitalopram to last until appointment date. Pended 28 day supply of birth control due to that is how it is dispensed at pharmacy.

## 2025-03-14 NOTE — TELEPHONE ENCOUNTER
PATIENT CALLED FOR MEDICATION REFILLS OF  escitalopram (LEXAPRO) 10 MG tablet   SHE HAS 5 -6 TABLETS LEFT    norgestimate-ethinyl estradiol (Tri-Sprintec) 0.18/0.215/0.25 MG-35 MCG per tablet   SHE IS OUT OF MEDICATION    [facility] [id number] - [place], [place] - [address] [place] - [phone] [facility] [phone]  [phone]     CALL BACK NUMBER [phone]

## 2025-03-17 ENCOUNTER — SPECIALTY PHARMACY (OUTPATIENT)
Dept: ENDOCRINOLOGY | Age: 45
End: 2025-03-17
Payer: COMMERCIAL

## 2025-03-17 DIAGNOSIS — Z79.4 TYPE 2 DIABETES MELLITUS WITH HYPERGLYCEMIA, WITH LONG-TERM CURRENT USE OF INSULIN: ICD-10-CM

## 2025-03-17 DIAGNOSIS — E11.65 TYPE 2 DIABETES MELLITUS WITH HYPERGLYCEMIA, WITH LONG-TERM CURRENT USE OF INSULIN: ICD-10-CM

## 2025-03-17 RX ORDER — TIRZEPATIDE 12.5 MG/.5ML
12.5 INJECTION, SOLUTION SUBCUTANEOUS
Qty: 2 ML | Refills: 0 | Status: SHIPPED | OUTPATIENT
Start: 2025-03-17

## 2025-03-17 NOTE — PROGRESS NOTES
Specialty Pharmacy Patient Management Program  One-Time Clinical Outreach     Cora Gutiérrez is a 45 y.o. female seen by an Endocrinology provider for Type 2 Diabetes and enrolled in the Endocrinology Patient Management program offered by [facility].      Patient's Mounjaro dose was increased to 12.5mg weekly (from 10mg weekly) today. Discussed dose increase with patient, possible side effects and how to avoid. Patient has been tolerating Mounjaro 10mg well without adverse effects. Will plan to check in with patient in 2 weeks to assess tolerability of higher Mounjaro dose.    Plan:  INCREASE to Mounjaro 12.5mg weekly   Pharmacist to check tolerability in 2 weeks       All questions answered, patient in agreement with plan, and patient expressed understanding .     Tomeka Constantino, PharmD, BCACP, BC-ADM, [facility]  Clinical Specialty Pharmacist, Endocrinology  3/17/2025  15:56 EDT

## 2025-03-17 NOTE — TELEPHONE ENCOUNTER
Specialty Pharmacy Patient Management Program  Prescription Refill Request     Patient currently fills medications at  Pharmacy. Needing refill(s) on the following:      Requested Prescriptions     Pending Prescriptions Disp Refills    Tirzepatide (Mounjaro) 12.5 MG/0.5ML solution auto-injector 2 mL 0     Sig: Inject 0.5 mL under the skin into the appropriate area as directed Every 7 (Seven) Days.       Last visit: 11/04/24    Next visit: 05/05/25    Patient requesting dose increase of Mounjaro to 12.5mg weekly (from 10mg weekly). Pended for PORSHA Shaw to review, and approve if appropriate.     Tomeka Constantino, PharmD, BCACP, BC-ADM, [facility]  Clinical Specialty Pharmacist, Endocrinology  3/17/2025  14:52 EDT

## 2025-03-17 NOTE — PROGRESS NOTES
"   Specialty Pharmacy Patient Management Program  Refill Outreach     Cora \"Cora Gutiérrez\" was contacted today regarding refills of their medication(s).    Refill Questions      Flowsheet Row Most Recent Value   Changes to allergies? No   Changes to medications? Yes  [increased dose of mounjaro to 12.5]   New conditions or infections since last clinic visit No   Unplanned office visit, urgent care, ED, or hospital admission in the last 4 weeks  No   How does patient/caregiver feel medication is working? Good   Financial problems or insurance changes  No   Since the previous refill, were any specialty medication doses or scheduled injections missed or delayed?  No   Does this patient require a clinical escalation to a pharmacist? No  [pharmacist did consultation with patient after refill coordination.]            Delivery Questions      Flowsheet Row Most Recent Value   Delivery method UPS   Delivery address verified with patient/caregiver? Yes   Delivery address Home   Number of medications in delivery 4   Medication(s) being filled and delivered Icosapent Ethyl (VASCEPA), Tirzepatide (Mounjaro), Rosuvastatin Calcium (CRESTOR), Insulin Aspart (novoLOG)   Doses left of specialty medications 1 week   Copay verified? Yes   Copay amount $89.16   Copay form of payment HSA/FSA on file   Delivery Date Selection 03/19/25   Signature Required No            Medication Adherence    Adherence tools used: patient uses a pill box to manage medications  Support network for adherence: family member, healthcare provider          Follow-up: 21 day(s)     Thais Lees, Pharmacy Technician  3/17/2025  15:56 EDT    "

## 2025-03-18 ENCOUNTER — OFFICE VISIT (OUTPATIENT)
Dept: FAMILY MEDICINE CLINIC | Facility: CLINIC | Age: 45
End: 2025-03-18
Payer: COMMERCIAL

## 2025-03-18 VITALS
OXYGEN SATURATION: 99 % | BODY MASS INDEX: 39.01 KG/M2 | HEIGHT: 62 IN | SYSTOLIC BLOOD PRESSURE: 108 MMHG | DIASTOLIC BLOOD PRESSURE: 70 MMHG | RESPIRATION RATE: 18 BRPM | HEART RATE: 76 BPM | WEIGHT: 212 LBS

## 2025-03-18 DIAGNOSIS — N89.8 ITCHING IN THE VAGINAL AREA: ICD-10-CM

## 2025-03-18 DIAGNOSIS — Z11.59 ENCOUNTER FOR HEPATITIS C SCREENING TEST FOR LOW RISK PATIENT: ICD-10-CM

## 2025-03-18 DIAGNOSIS — Z12.31 BREAST CANCER SCREENING BY MAMMOGRAM: ICD-10-CM

## 2025-03-18 DIAGNOSIS — Z30.41 ENCOUNTER FOR SURVEILLANCE OF CONTRACEPTIVE PILLS: ICD-10-CM

## 2025-03-18 DIAGNOSIS — F32.A DEPRESSION, UNSPECIFIED DEPRESSION TYPE: Primary | ICD-10-CM

## 2025-03-18 LAB
B-HCG UR QL: NEGATIVE
EXPIRATION DATE: NORMAL
INTERNAL NEGATIVE CONTROL: NEGATIVE
INTERNAL POSITIVE CONTROL: POSITIVE
Lab: NORMAL

## 2025-03-18 RX ORDER — NORGESTIMATE AND ETHINYL ESTRADIOL 7DAYSX3 28
1 KIT ORAL DAILY
Qty: 28 TABLET | Refills: 11 | Status: SHIPPED | OUTPATIENT
Start: 2025-03-18

## 2025-03-18 RX ORDER — ESCITALOPRAM OXALATE 10 MG/1
10 TABLET ORAL DAILY
Qty: 90 TABLET | Refills: 1 | Status: SHIPPED | OUTPATIENT
Start: 2025-03-18

## 2025-03-18 NOTE — PROGRESS NOTES
Subjective   Cora Gutiérrez is a 45 y.o. female.     History of Present Illness     The following portions of the patient's history were reviewed and updated as appropriate: allergies, current medications, past family history, past medical history, past social history, past surgical history and problem list.       The patient is a 45-year-old female who presents for chronic illness management and medication refills.    Chronic depression and anxiety x years. Managed on escitalopram 10 mg daily for several years, which she reports as beneficial in managing her mood. She expresses interest in exploring the potential benefits of increasing the dosage. Her sleep pattern remains satisfactory. She does not currently engage in therapy but acknowledges the availability of the [place] [employer] should she feel the need for therapeutic intervention. She reports no feelings of depression or hopelessness.    She is currently on semaglutide for weight management, prescribed by her endocrinologist, and has been using it for over six months with positive results. She reports a decreased appetite, often going without food for an entire day. She has experienced episodes of hypoglycemia, will discuss w endo. She is due for an eye examination and plans to request a foot examination from endo    She is also on tri-Sprintec, an oral contraceptive, which she tolerates well without any side effects. On period now. No side effects. She is currently menstruating and reports no history of thromboembolic events.   She has never undergone a mammogram and expresses concern about potential breast sensitivity during the procedure.  She suspects a current yeast infection due to pruritus. She has not previously used Diflucan or fluconazole for treatment.    Her thyroid levels were elevated in November 2024, and she is scheduled for a recheck by endo. She is on levothyroxine.    SOCIAL HISTORY  She admits to smoking and  vaping.    MEDICATIONS  escitalopram, semaglutide, Sprintec    IMMUNIZATIONS  She is due for a tetanus vaccine and prefers to defer the COVID-19, influenza, and pneumonia vaccines at this time.     PHQ-2 Depression Screening  Little interest or pleasure in doing things? Several days   Feeling down, depressed, or hopeless? Not at all   PHQ-2 Total Score 1         Review of Systems        Current Outpatient Medications:     Blood Glucose Monitoring Suppl (Accu-Chek Kristina Plus) w/Device kit, Use to check blood sugar 3 times a day., Disp: 1 kit, Rfl: 0    Continuous Glucose Sensor (Dexcom G7 Sensor) misc, Use 1 each Every 10 (Ten) Days., Disp: 9 each, Rfl: 0    dapagliflozin-metformin HCl ER (Xigduo XR) 5-1000 MG tablet, Take 1 tablet by mouth 2 (Two) Times a Day., Disp: 180 tablet, Rfl: 0    escitalopram (LEXAPRO) 10 MG tablet, Take 1 tablet by mouth Daily., Disp: 90 tablet, Rfl: 1    fenofibrate (TRICOR) 145 MG tablet, Take 1 tablet by mouth Daily., Disp: 90 tablet, Rfl: 0    Glucagon (Baqsimi Two Pack) 3 MG/DOSE powder, Spray 3 mg into the nostril(s) as directed by provider As Needed for hypoglycemia on pump, Disp: 2 each, Rfl: 1    glucose blood (Accu-Chek SmartView) test strip, Check 4 times daily Use as instructed, Disp: 100 each, Rfl: 12    glucose blood test strip, Check Blood Sugar 3 (Three) Times a Day., Disp: 300 each, Rfl: 4    icosapent ethyl (Vascepa) 1 g capsule capsule, Take 2 capsules (2g) by mouth 2 (Two) Times a Day With Meals., Disp: 360 capsule, Rfl: 1    insulin aspart (NovoLOG FlexPen) 100 UNIT/ML solution pen-injector sc pen, Inject 120 Units under the skin into the appropriate area as directed Daily. Up to 120u daily with pump, Disp: 45 mL, Rfl: 0    Insulin Aspart (NovoLOG) 100 UNIT/ML injection, Use 100 units daily via Omnipod as directed., Disp: 90 mL, Rfl: 0    Insulin Disposable Pump (Omnipod 5 [id number] Pods Gen 5) misc, Use 1 pod Every Other Day., Disp: 45 each, Rfl: 0    Insulin Pen Needle  (BD Pen Needle Gina U/F) 32G X 4 MM misc, USE FIVE TIMES DAILY WITH INSULIN, Disp: 500 each, Rfl: 0    lidocaine (LIDODERM) 5 %, Place 1 patch on the skin as directed by provider Daily As Needed (back pain)., Disp: , Rfl:     norgestimate-ethinyl estradiol (Tri-Sprintec) 0.18/0.215/0.25 MG-35 MCG per tablet, Take 1 tablet by mouth Daily., Disp: 28 tablet, Rfl: 11    rosuvastatin (CRESTOR) 40 MG tablet, Take 1 tablet by mouth Daily., Disp: 90 tablet, Rfl: 1    Synthroid 175 MCG tablet, 1 tablet mon- sat and 2 tablets on every Sunday, Disp: , Rfl:     Tirzepatide (Mounjaro) 12.5 MG/0.5ML solution auto-injector, Inject 0.5 mL under the skin into the appropriate area as directed Every 7 (Seven) Days., Disp: 2 mL, Rfl: 0    Objective   Physical Exam  Vitals reviewed.   Constitutional:       Appearance: Normal appearance. She is obese.   HENT:      Mouth/Throat:      Mouth: Mucous membranes are moist.   Cardiovascular:      Rate and Rhythm: Normal rate and regular rhythm.      Pulses: Normal pulses.      Heart sounds: Normal heart sounds.   Pulmonary:      Effort: Pulmonary effort is normal.      Breath sounds: Normal breath sounds.   Skin:     General: Skin is warm.   Neurological:      General: No focal deficit present.      Mental Status: She is alert.   Psychiatric:         Mood and Affect: Mood normal.      Comments: Stable on meds           Vitals:    03/18/25 0923   BP: 108/70   Pulse: 76   Resp: 18   SpO2: 99%     Body mass index is 38.78 kg/m².    Procedures    TSH   Date Value Ref Range Status   11/04/2024 15.400 (H) 0.450 - 4.500 uIU/mL Final     Hemoglobin A1C   Date Value Ref Range Status   11/04/2024 7.6 (H) 4.8 - 5.6 % Final     Comment:              Prediabetes: 5.7 - 6.4           Diabetes: >6.4           Glycemic control for adults with diabetes: <7.0              Over the past 2 weeks, how often have you been bothered by any of the following problems?  Little interest or pleasure in doing things:  Several days  Feeling down, depressed, or hopeless: Not at all      Assessment & Plan   Problems Addressed this Visit       Depression - Primary    Relevant Medications    escitalopram (LEXAPRO) 10 MG tablet     Other Visit Diagnoses         Itching in the vaginal area        Relevant Orders    NuSwab BV & Candida - Swab, Vagina      Encounter for surveillance of contraceptive pills        Relevant Orders    POCT pregnancy, urine (Completed)      Breast cancer screening by mammogram        Relevant Orders    Mammo Screening Digital Tomosynthesis Bilateral With CAD      Encounter for hepatitis C screening test for low risk patient        Relevant Orders    Hepatitis C Antibody          Diagnoses         Codes Comments      Depression, unspecified depression type    -  Primary ICD-10-CM: F32.A  ICD-9-CM: 311       Itching in the vaginal area     ICD-10-CM: N89.8  ICD-9-CM: 698.1       Encounter for surveillance of contraceptive pills     ICD-10-CM: Z30.41  ICD-9-CM: V25.41       Breast cancer screening by mammogram     ICD-10-CM: Z12.31  ICD-9-CM: V76.12       Encounter for hepatitis C screening test for low risk patient     ICD-10-CM: Z11.59  ICD-9-CM: V73.89           Orders Placed This Encounter   Procedures    NuSwab BV & Candida - Swab, Vagina     Release to patient:   Routine Release [[id number]]    Mammo Screening Digital Tomosynthesis Bilateral With CAD     Standing Status:   Future     Expected Date:   3/23/2025     Expiration Date:   3/18/2026     Scheduling Instructions:      [place] location     Reason for Exam::   breast cancer screen     Release to patient:   Routine Release [[id number]]    Hepatitis C Antibody     Standing Status:   Future     Expected Date:   3/23/2025     Expiration Date:   6/18/2026     Release to patient:   Routine Release [[id number]]    POCT pregnancy, urine     Release to patient:   Routine Release [[id number]]            1. Depression.  She has been on escitalopram 10 mg  daily for a few years and reports feeling good mood-wise. She expressed interest in increasing the dose. Potential side effects of increased dosage, including mood changes, headaches, weight gain, and low sex drive, were discussed. She will try doubling her current dose to 20 mg for two weeks and monitor for any side effects. A prescription for escitalopram 20 mg will be sent to [facility] pharmacy.    2. Diabetes Mellitus.  She is currently on semaglutide for diabetes management, prescribed by her endocrinologist, and reports it is effective. She has experienced some emergency low blood sugar episodes and has nasal glucagon for such events. She was advised to ensure adequate food intake throughout the day and to incorporate sufficient protein into her diet to prevent muscle loss associated with GLP-1 medications. Regular exercise was also recommended.    3. Oral Contraceptive Management.  She is on Sprintec and reports no side effects. The potential interaction between Mounjaro and hormonal contraceptives was discussed. She was advised to consult her pharmacist regarding this interaction and consider alternative contraceptive methods if necessary. A urine sample will be collected today to rule out pregnancy before refilling her birth control prescription. Advised condoms with dose increase in glp1 x 4 weeks. She does not want to consider nexplanon or IUD    4. Suspected Yeast Infection.  She reports itching and suspects a yeast infection. A swab test will be conducted today to confirm the diagnosis. Limit sweets, yogurt or probiotic daily    5. Elevated Thyroid Levels.  Her thyroid levels were high in November 2024. She is on a specific medication regimen: one tablet from Monday to Saturday and two tablets on Sunday mornings. She was advised to use a pill planner or keep the medication by her bedside to help remember the dosing schedule. She has a follow-up appointment scheduled for lab rechecks.    6. Health  Maintenance.  A mammogram will be ordered and scheduled at the [place] location. She was informed that her next colonoscopy is due when she turns 46 next year. Her last Pap smear in 2022 was negative, and the next one is due in 2027. She was advised to get a tetanus shot, especially in case of injuries like dog bites or stepping on a nail. She opted to wait on the COVID-19, influenza, and pneumonia vaccines.     Declines vaccines today         Education provided in AVS   Return if symptoms worsen or fail to improve.    Patient or patient representative verbalized consent for the use of Ambient Listening during the visit with  PORSHA Huang for chart documentation. 3/18/2025  12:08 EDT

## 2025-03-21 DIAGNOSIS — B37.31 VAGINAL YEAST INFECTION: Primary | ICD-10-CM

## 2025-03-21 LAB
A VAGINAE DNA VAG QL NAA+PROBE: ABNORMAL SCORE
BVAB2 DNA VAG QL NAA+PROBE: ABNORMAL SCORE
C ALBICANS DNA VAG QL NAA+PROBE: POSITIVE
C GLABRATA DNA VAG QL NAA+PROBE: NEGATIVE
MEGA1 DNA VAG QL NAA+PROBE: ABNORMAL SCORE

## 2025-03-21 RX ORDER — FLUCONAZOLE 150 MG/1
150 TABLET ORAL ONCE
Qty: 1 TABLET | Refills: 0 | Status: SHIPPED | OUTPATIENT
Start: 2025-03-21 | End: 2025-03-21

## 2025-03-31 ENCOUNTER — SPECIALTY PHARMACY (OUTPATIENT)
Dept: ENDOCRINOLOGY | Age: 45
End: 2025-03-31
Payer: COMMERCIAL

## 2025-03-31 RX ORDER — TIRZEPATIDE 12.5 MG/.5ML
12.5 INJECTION, SOLUTION SUBCUTANEOUS
Qty: 2 ML | Refills: 0 | Status: SHIPPED | OUTPATIENT
Start: 2025-03-31

## 2025-03-31 NOTE — PROGRESS NOTES
"   Specialty Pharmacy Patient Management Program  One-Time Clinical Outreach     Cora Gutiérrez is a 45 y.o. female seen by an Endocrinology provider for Type 2 Diabetes and enrolled in the Endocrinology Patient Management program offered by [facility].      Contacted patient to discuss tolerability of Mounjaro following dose increase. Patient reports taking 2 doses of Mounjaro 12.5mg so far and reports some nausea and increase in frequency of bowel movements. She does feel that these side effects are somewhat dependent on what she eats, especially if she eats \"something heavy\" and plans to work on diet modifications to help improve symptoms. Patient states they feel comfortable continuing with Mounjaro 12.5mg at this time.     All questions answered, patient in agreement with plan, and patient expressed understanding .    Tomeka Constantino, PharmD, BCACP, BC-ADM, [facility]  Clinical Specialty Pharmacist, Endocrinology  3/31/2025  14:05 EDT  "

## 2025-03-31 NOTE — TELEPHONE ENCOUNTER
" Specialty Pharmacy Patient Management Program  Prescription Refill Request     Patient currently fills medications at  Pharmacy. Needing refill(s) on the following:      Requested Prescriptions     Pending Prescriptions Disp Refills    Tirzepatide (Mounjaro) 12.5 MG/0.5ML solution auto-injector 2 mL 0     Sig: Inject 0.5 mL under the skin into the appropriate area as directed Every 7 (Seven) Days.       Last visit: 11/04/24    3/17 - Increased to Mounjaro to 12.5mg weekly (from 10mg weekly)  Next visit: 05/05/25    Patient reports taking 2 doses of Mounjaro 12.5mg so far and reports some nausea and increase in frequency of bowel movements. She does feel that these side effects are somewhat dependent on what she eats, especially if she eats \"something heavy\" and plans to work on diet modifications to help improve symptoms. Patient states they feel comfortable continuing with Mounjaro 12.5mg at this time.     Pended for PORSHA Shaw to review, and approve if appropriate.       Tomeka Constantino, PharmD, BCACP, BC-ADM, CDCES  Clinical Specialty Pharmacist, Endocrinology  3/31/2025  14:10 EDT    "

## 2025-04-07 DIAGNOSIS — E78.2 HYPERLIPEMIA, MIXED: ICD-10-CM

## 2025-04-07 DIAGNOSIS — Z79.4 TYPE 2 DIABETES MELLITUS WITH HYPERGLYCEMIA, WITH LONG-TERM CURRENT USE OF INSULIN: ICD-10-CM

## 2025-04-07 DIAGNOSIS — E11.65 TYPE 2 DIABETES MELLITUS WITH HYPERGLYCEMIA, WITH LONG-TERM CURRENT USE OF INSULIN: ICD-10-CM

## 2025-04-07 NOTE — TELEPHONE ENCOUNTER
Specialty Pharmacy Patient Management Program  Prescription Refill Request     Patient currently fills medications at  Pharmacy. Needing refill(s) on the following:      Requested Prescriptions     Pending Prescriptions Disp Refills    icosapent ethyl (Vascepa) 1 g capsule capsule 120 capsule 0     Sig: Take 2 capsules (2g) by mouth 2 (Two) Times a Day With Meals.    Insulin Aspart (NovoLOG) 100 UNIT/ML injection 30 mL 0     Sig: Use 100 units daily via Omnipod as directed.       Last visit: 11/04/24      Next visit: 05/05/25    Pended for PORSHA Shaw to review, and approve if appropriate.     Tomeka Constantino, PharmD, BCACP, BC-ADM, CDCES  Clinical Specialty Pharmacist, Endocrinology  4/7/2025  16:12 EDT

## 2025-04-08 RX ORDER — INSULIN ASPART 100 [IU]/ML
100 INJECTION, SOLUTION INTRAVENOUS; SUBCUTANEOUS DAILY
Qty: 30 ML | Refills: 0 | Status: SHIPPED | OUTPATIENT
Start: 2025-04-08

## 2025-04-08 RX ORDER — ICOSAPENT ETHYL 1 G/1
2 CAPSULE ORAL 2 TIMES DAILY WITH MEALS
Qty: 120 CAPSULE | Refills: 0 | Status: SHIPPED | OUTPATIENT
Start: 2025-04-08

## 2025-04-11 ENCOUNTER — SPECIALTY PHARMACY (OUTPATIENT)
Dept: ENDOCRINOLOGY | Age: 45
End: 2025-04-11
Payer: COMMERCIAL

## 2025-04-11 NOTE — PROGRESS NOTES
"   Specialty Pharmacy Patient Management Program  Refill Outreach     Cora \"Cora Gutiérrez\" was contacted today regarding refills of their medication(s).    Refill Questions      Flowsheet Row Most Recent Value   Changes to allergies? No   Changes to medications? No   New conditions or infections since last clinic visit No   Unplanned office visit, urgent care, ED, or hospital admission in the last 4 weeks  No   How does patient/caregiver feel medication is working? Good   Financial problems or insurance changes  No   Since the previous refill, were any specialty medication doses or scheduled injections missed or delayed?  Yes  [vascepa, missed two doses, patient forgot to take.]   Does this patient require a clinical escalation to a pharmacist? Yes  [vascepa, missed two doses, patient forgot to take.]            Delivery Questions      Flowsheet Row Most Recent Value   Delivery method UPS   Delivery address verified with patient/caregiver? Yes   Delivery address Home   Number of medications in delivery 3   Medication(s) being filled and delivered Insulin Aspart (novoLOG), Icosapent Ethyl (VASCEPA), Tirzepatide (Mounjaro)   Doses left of specialty medications 1 week   Copay verified? Yes   Copay amount $70.00   Copay form of payment Credit/debit on file   Delivery Date Selection 04/15/25   Signature Required No   Do you consent to receive electronic handouts?  No            Medication Adherence    Adherence tools used: patient uses a pill box to manage medications  Support network for adherence: family member, healthcare provider          Follow-up: 21 day(s)     Thais Lees, Pharmacy Technician  4/11/2025  14:29 EDT    "

## 2025-04-14 NOTE — PROGRESS NOTES
"   Specialty Pharmacy Patient Management Program  One-Time Clinical Outreach     Cora Gutiérrez is a 45 y.o. female seen by an Endocrinology provider for Type 2 Diabetes and Hyperlipidemia and enrolled in the Endocrinology Patient Management program offered by [facility].      Pharmacist escalation: Patient reported 2 missed doses of Vascepa since last refill coordination, stating that she \"forgot to take\" - per Care Coordinator.   Two missed doses in 30 days should not have clinically significant impact on therapy and does not require further intervention from pharmacist. PDC in [facility] of 70% but patient has had consistent fills (either on time or early) for last 4 refills.          Tomeka Constantino, PharmD, BCACP, BC-ADM, [facility]  Clinical Specialty Pharmacist, Endocrinology  4/14/2025  11:07 EDT  "

## 2025-05-08 ENCOUNTER — SPECIALTY PHARMACY (OUTPATIENT)
Dept: ENDOCRINOLOGY | Age: 45
End: 2025-05-08
Payer: COMMERCIAL

## 2025-05-08 NOTE — PROGRESS NOTES
"   Specialty Pharmacy Patient Management Program  Refill Outreach     Cora \"Cora Gutiérrez\" was contacted today regarding refills of their medication(s).    Refill Questions      Flowsheet Row Most Recent Value   Changes to allergies? No   Changes to medications? No   New conditions or infections since last clinic visit No   Unplanned office visit, urgent care, ED, or hospital admission in the last 4 weeks  No   How does patient/caregiver feel medication is working? Good   Financial problems or insurance changes  No   Since the previous refill, were any specialty medication doses or scheduled injections missed or delayed?  No   Does this patient require a clinical escalation to a pharmacist? No            Delivery Questions      Flowsheet Row Most Recent Value   Delivery method UPS   Delivery address verified with patient/caregiver? Yes   Delivery address Home   Number of medications in delivery 1   Medication(s) being filled and delivered Tirzepatide (Mounjaro)   Doses left of specialty medications 1 week   Copay verified? Yes   Copay amount $25.00   Copay form of payment Credit/debit on file   Delivery Date Selection 05/13/25   Signature Required No   Do you consent to receive electronic handouts?  No            Medication Adherence    Adherence tools used: patient uses a pill box to manage medications  Support network for adherence: family member, healthcare provider          Follow-up: 21 day(s)     Thais Lees, Pharmacy Technician  5/8/2025  14:52 EDT    "

## 2025-05-14 ENCOUNTER — SPECIALTY PHARMACY (OUTPATIENT)
Dept: ENDOCRINOLOGY | Age: 45
End: 2025-05-14
Payer: COMMERCIAL

## 2025-05-14 DIAGNOSIS — E11.65 TYPE 2 DIABETES MELLITUS WITH HYPERGLYCEMIA, WITH LONG-TERM CURRENT USE OF INSULIN: ICD-10-CM

## 2025-05-14 DIAGNOSIS — Z79.4 TYPE 2 DIABETES MELLITUS WITH HYPERGLYCEMIA, WITH LONG-TERM CURRENT USE OF INSULIN: ICD-10-CM

## 2025-05-14 DIAGNOSIS — E78.2 HYPERLIPEMIA, MIXED: ICD-10-CM

## 2025-05-14 RX ORDER — ACYCLOVIR 400 MG/1
1 TABLET ORAL
Qty: 3 EACH | Refills: 0 | Status: SHIPPED | OUTPATIENT
Start: 2025-05-14

## 2025-05-14 RX ORDER — ICOSAPENT ETHYL 1 G/1
2 CAPSULE ORAL 2 TIMES DAILY WITH MEALS
Qty: 120 CAPSULE | Refills: 0 | Status: SHIPPED | OUTPATIENT
Start: 2025-05-14

## 2025-05-14 RX ORDER — DAPAGLIFLOZIN AND METFORMIN HYDROCHLORIDE 5; 1000 MG/1; MG/1
1 TABLET, FILM COATED, EXTENDED RELEASE ORAL 2 TIMES DAILY
Qty: 60 TABLET | Refills: 0 | Status: SHIPPED | OUTPATIENT
Start: 2025-05-14

## 2025-05-14 RX ORDER — INSULIN ASPART 100 [IU]/ML
100 INJECTION, SOLUTION INTRAVENOUS; SUBCUTANEOUS DAILY
Qty: 30 ML | Refills: 0 | Status: SHIPPED | OUTPATIENT
Start: 2025-05-14

## 2025-05-14 RX ORDER — INSULIN PMP CART,AUT,G6/7,CNTR
1 EACH SUBCUTANEOUS
Qty: 15 EACH | Refills: 0 | Status: SHIPPED | OUTPATIENT
Start: 2025-05-14

## 2025-05-14 NOTE — TELEPHONE ENCOUNTER
Specialty Pharmacy Patient Management Program  Prescription Refill Request     Patient currently fills medications at  Pharmacy. Needing refill(s) on the following:      Requested Prescriptions     Pending Prescriptions Disp Refills    Insulin Disposable Pump (Omnipod 5 [id number] Pods Gen 5) misc 15 each 0     Sig: Use 1 pod Every Other Day.    Continuous Glucose Sensor (Dexcom G7 Sensor) misc 3 each 0     Sig: Use 1 each Every 10 (Ten) Days.    dapagliflozin-metformin HCl ER (Xigduo XR) 5-1000 MG tablet 60 tablet 0     Sig: Take 1 tablet by mouth 2 (Two) Times a Day.    Insulin Aspart (NovoLOG) 100 UNIT/ML injection 30 mL 0     Sig: Use 100 units daily via Omnipod as directed.    icosapent ethyl (Vascepa) 1 g capsule capsule 120 capsule 0     Sig: Take 2 capsules (2g) by mouth 2 (Two) Times a Day With Meals.       Last visit: 11/4/24  Next visit: 6/13/25    Pended for PORSHA Shaw to review, and approve if appropriate.       Radha Rowland, PharmD  Clinical Specialty Pharmacist, Endocrinology  5/14/2025  12:31 EDT

## 2025-05-14 NOTE — PROGRESS NOTES
"   Specialty Pharmacy Patient Management Program  Refill Outreach     Cora \"Cora Gutiérrez\" was contacted today regarding refills of their medication(s).    Refill Questions      Flowsheet Row Most Recent Value   Changes to allergies? No   Changes to medications? No   New conditions or infections since last clinic visit No   Unplanned office visit, urgent care, ED, or hospital admission in the last 4 weeks  No   How does patient/caregiver feel medication is working? Good   Financial problems or insurance changes  No   Since the previous refill, were any specialty medication doses or scheduled injections missed or delayed?  Yes  [missed several doses of vascepa]   If yes, please provide the amount missed several doses of the vascepa but is back on track now   Why were doses missed? pt did not disclose   Does this patient require a clinical escalation to a pharmacist? No  [missed several doses of the vascepa but is back on track now]            Delivery Questions      Flowsheet Row Most Recent Value   Delivery method UPS   Delivery address verified with patient/caregiver? Yes   Delivery address Home   Number of medications in delivery 5   Medication(s) being filled and delivered Fenofibrate (TRICOR), Insulin Disposable Pump (Omnipod 5 [id number] Pods Gen 5), Dapagliflozin Prop-metFORMIN (XIGDUO XR), Insulin Aspart (novoLOG)   Doses left of specialty medications 1 week   Copay verified? Yes   Copay amount $64.74   Copay form of payment HSA/FSA on file   Delivery Date Selection 05/16/25   Signature Required No   Do you consent to receive electronic handouts?  No            Medication Adherence    Adherence tools used: patient uses a pill box to manage medications  Support network for adherence: family member, healthcare provider          Follow-up: 23 day(s)     Thais Lees, Pharmacy Technician  5/14/2025  13:23 EDT    "

## 2025-05-29 ENCOUNTER — SPECIALTY PHARMACY (OUTPATIENT)
Dept: ENDOCRINOLOGY | Age: 45
End: 2025-05-29
Payer: COMMERCIAL

## 2025-05-29 NOTE — PROGRESS NOTES
"   Specialty Pharmacy Patient Management Program  Refill Outreach     Cora \"Cora Gutiérrez\" was contacted today regarding refills of their medication(s).    Refill Questions      Flowsheet Row Most Recent Value   Changes to allergies? No   Changes to medications? No   New conditions or infections since last clinic visit No   Unplanned office visit, urgent care, ED, or hospital admission in the last 4 weeks  No   How does patient/caregiver feel medication is working? Good   Financial problems or insurance changes  No   Since the previous refill, were any specialty medication doses or scheduled injections missed or delayed?  No   Does this patient require a clinical escalation to a pharmacist? No            Delivery Questions      Flowsheet Row Most Recent Value   Delivery method UPS   Delivery address verified with patient/caregiver? Yes   Delivery address Home   Number of medications in delivery 3   Medication(s) being filled and delivered Rosuvastatin Calcium (CRESTOR), Icosapent Ethyl (VASCEPA), Tirzepatide (Mounjaro)   Doses left of specialty medications 1 week   Copay verified? Yes   Copay amount $63.36   Copay form of payment HSA/FSA on file   Delivery Date Selection 06/03/25   Signature Required No   Do you consent to receive electronic handouts?  No            Medication Adherence    Adherence tools used: patient uses a pill box to manage medications  Support network for adherence: family member, healthcare provider          Follow-up: 21 day(s)     Thais Lees, Pharmacy Technician  5/29/2025  15:41 EDT    "

## 2025-06-10 DIAGNOSIS — E03.9 ACQUIRED HYPOTHYROIDISM: ICD-10-CM

## 2025-06-10 RX ORDER — LEVOTHYROXINE SODIUM 175 MCG
TABLET ORAL
Qty: 100 TABLET | Refills: 0 | Status: SHIPPED | OUTPATIENT
Start: 2025-06-10

## 2025-06-10 NOTE — TELEPHONE ENCOUNTER
Rx Refill Note  Requested Prescriptions     Pending Prescriptions Disp Refills    Synthroid 175 MCG tablet       Si tablet mon- sat and 2 tablets on every       Last office visit with prescribing clinician: 2024   Last telemedicine visit with prescribing clinician: Visit date not found   Next office visit with prescribing clinician: 2025                         Would you like a call back once the refill request has been completed: [] Yes [] No    If the office needs to give you a call back, can they leave a voicemail: [] Yes [] No    Arely Adkins MA  06/10/25, 11:18 EDT

## 2025-06-17 DIAGNOSIS — E11.65 TYPE 2 DIABETES MELLITUS WITH HYPERGLYCEMIA, WITH LONG-TERM CURRENT USE OF INSULIN: ICD-10-CM

## 2025-06-17 DIAGNOSIS — Z79.4 TYPE 2 DIABETES MELLITUS WITH HYPERGLYCEMIA, WITH LONG-TERM CURRENT USE OF INSULIN: ICD-10-CM

## 2025-06-17 RX ORDER — ACYCLOVIR 400 MG/1
1 TABLET ORAL
Qty: 3 EACH | Refills: 0 | Status: SHIPPED | OUTPATIENT
Start: 2025-06-17

## 2025-06-17 RX ORDER — FENOFIBRATE 145 MG/1
145 TABLET, FILM COATED ORAL DAILY
Qty: 30 TABLET | Refills: 0 | Status: SHIPPED | OUTPATIENT
Start: 2025-06-17

## 2025-06-17 RX ORDER — INSULIN ASPART 100 [IU]/ML
100 INJECTION, SOLUTION INTRAVENOUS; SUBCUTANEOUS DAILY
Qty: 30 ML | Refills: 0 | Status: SHIPPED | OUTPATIENT
Start: 2025-06-17

## 2025-06-17 RX ORDER — INSULIN PMP CART,AUT,G6/7,CNTR
1 EACH SUBCUTANEOUS
Qty: 15 EACH | Refills: 0 | Status: SHIPPED | OUTPATIENT
Start: 2025-06-17

## 2025-06-17 RX ORDER — DAPAGLIFLOZIN AND METFORMIN HYDROCHLORIDE 5; 1000 MG/1; MG/1
1 TABLET, FILM COATED, EXTENDED RELEASE ORAL 2 TIMES DAILY
Qty: 60 TABLET | Refills: 0 | Status: SHIPPED | OUTPATIENT
Start: 2025-06-17

## 2025-06-20 ENCOUNTER — SPECIALTY PHARMACY (OUTPATIENT)
Dept: ENDOCRINOLOGY | Age: 45
End: 2025-06-20
Payer: COMMERCIAL

## 2025-06-20 NOTE — PROGRESS NOTES
"   Specialty Pharmacy Patient Management Program  Refill Outreach     Cora \"Cora uGtiérrez\" was contacted today regarding refills of their medication(s).    Refill Questions      Flowsheet Row Most Recent Value   Changes to allergies? No   Changes to medications? No   New conditions or infections since last clinic visit No   Unplanned office visit, urgent care, ED, or hospital admission in the last 4 weeks  No   How does patient/caregiver feel medication is working? Good   Financial problems or insurance changes  No   Since the previous refill, were any specialty medication doses or scheduled injections missed or delayed?  No   Does this patient require a clinical escalation to a pharmacist? No            Delivery Questions      Flowsheet Row Most Recent Value   Delivery method UPS   Delivery address verified with patient/caregiver? Yes   Delivery address Home   Number of medications in delivery 6   Medication(s) being filled and delivered Fenofibrate (TRICOR), Insulin Aspart (novoLOG), Insulin Disposable Pump (Omnipod 5 [id number] Pods Gen 5), Continuous Glucose Sensor (Dexcom G7 Sensor), Dapagliflozin Prop-metFORMIN (XIGDUO XR), Tirzepatide (Mounjaro)   Doses left of specialty medications 1 week   Copay verified? Yes   Copay amount $64.68   Copay form of payment HSA/FSA on file   Delivery Date Selection 06/24/25   Signature Required No   Do you consent to receive electronic handouts?  No            Medication Adherence    Adherence tools used: patient uses a pill box to manage medications  Support network for adherence: family member, healthcare provider          Follow-up: 21 day(s)     Thais Lees, Pharmacy Technician  6/20/2025  13:44 EDT    "

## 2025-07-10 ENCOUNTER — SPECIALTY PHARMACY (OUTPATIENT)
Dept: ENDOCRINOLOGY | Age: 45
End: 2025-07-10
Payer: COMMERCIAL

## 2025-07-10 NOTE — PROGRESS NOTES
"   Specialty Pharmacy Patient Management Program  Refill Outreach     Cora \"Cora Gutiérrez\" was contacted today regarding refills of their medication(s).    Refill Questions      Flowsheet Row Most Recent Value   Changes to allergies? No   Changes to medications? No   New conditions or infections since last clinic visit No   Unplanned office visit, urgent care, ED, or hospital admission in the last 4 weeks  No   How does patient/caregiver feel medication is working? Good   Financial problems or insurance changes  No   Since the previous refill, were any specialty medication doses or scheduled injections missed or delayed?  No   Does this patient require a clinical escalation to a pharmacist? No            Delivery Questions      Flowsheet Row Most Recent Value   Delivery method UPS   Delivery address verified with patient/caregiver? Yes   Delivery address Home   Number of medications in delivery 1   Medication(s) being filled and delivered Tirzepatide (Mounjaro)   Doses left of specialty medications 1 week   Copay verified? Yes   Copay amount $25.00   Copay form of payment Credit/debit on file   Delivery Date Selection 07/15/25   Signature Required No   Do you consent to receive electronic handouts?  No            Medication Adherence    Adherence tools used: patient uses a pill box to manage medications  Support network for adherence: family member, healthcare provider          Follow-up: 21 day(s)     Thais Lees, Pharmacy Technician  7/10/2025  14:58 EDT    "

## 2025-07-14 ENCOUNTER — SPECIALTY PHARMACY (OUTPATIENT)
Dept: ENDOCRINOLOGY | Age: 45
End: 2025-07-14
Payer: COMMERCIAL

## 2025-07-14 ENCOUNTER — OFFICE VISIT (OUTPATIENT)
Dept: ENDOCRINOLOGY | Age: 45
End: 2025-07-14
Payer: COMMERCIAL

## 2025-07-14 VITALS
OXYGEN SATURATION: 98 % | HEIGHT: 62 IN | WEIGHT: 217.4 LBS | TEMPERATURE: 98.1 F | DIASTOLIC BLOOD PRESSURE: 80 MMHG | SYSTOLIC BLOOD PRESSURE: 140 MMHG | HEART RATE: 87 BPM | BODY MASS INDEX: 40.01 KG/M2

## 2025-07-14 DIAGNOSIS — Z79.4 TYPE 2 DIABETES MELLITUS WITH HYPERGLYCEMIA, WITH LONG-TERM CURRENT USE OF INSULIN: ICD-10-CM

## 2025-07-14 DIAGNOSIS — E11.65 TYPE 2 DIABETES MELLITUS WITH HYPERGLYCEMIA, WITH LONG-TERM CURRENT USE OF INSULIN: ICD-10-CM

## 2025-07-14 DIAGNOSIS — E66.01 CLASS 2 SEVERE OBESITY DUE TO EXCESS CALORIES WITH SERIOUS COMORBIDITY AND BODY MASS INDEX (BMI) OF 39.0 TO 39.9 IN ADULT: ICD-10-CM

## 2025-07-14 DIAGNOSIS — E03.9 ACQUIRED HYPOTHYROIDISM: ICD-10-CM

## 2025-07-14 DIAGNOSIS — E66.812 CLASS 2 SEVERE OBESITY DUE TO EXCESS CALORIES WITH SERIOUS COMORBIDITY AND BODY MASS INDEX (BMI) OF 39.0 TO 39.9 IN ADULT: ICD-10-CM

## 2025-07-14 DIAGNOSIS — E78.2 HYPERLIPEMIA, MIXED: ICD-10-CM

## 2025-07-14 DIAGNOSIS — Z79.4 TYPE 2 DIABETES MELLITUS WITH HYPERGLYCEMIA, WITH LONG-TERM CURRENT USE OF INSULIN: Primary | ICD-10-CM

## 2025-07-14 DIAGNOSIS — E11.65 TYPE 2 DIABETES MELLITUS WITH HYPERGLYCEMIA, WITH LONG-TERM CURRENT USE OF INSULIN: Primary | ICD-10-CM

## 2025-07-14 PROCEDURE — 95251 CONT GLUC MNTR ANALYSIS I&R: CPT | Performed by: NURSE PRACTITIONER

## 2025-07-14 PROCEDURE — 99214 OFFICE O/P EST MOD 30 MIN: CPT | Performed by: NURSE PRACTITIONER

## 2025-07-14 RX ORDER — INSULIN PMP CART,AUT,G6/7,CNTR
1 EACH SUBCUTANEOUS
Qty: 15 EACH | Refills: 1 | Status: SHIPPED | OUTPATIENT
Start: 2025-07-14

## 2025-07-14 RX ORDER — TIRZEPATIDE 12.5 MG/.5ML
12.5 INJECTION, SOLUTION SUBCUTANEOUS
Qty: 6 ML | Refills: 0 | Status: SHIPPED | OUTPATIENT
Start: 2025-07-14

## 2025-07-14 RX ORDER — DAPAGLIFLOZIN AND METFORMIN HYDROCHLORIDE 5; 1000 MG/1; MG/1
1 TABLET, FILM COATED, EXTENDED RELEASE ORAL 2 TIMES DAILY
Qty: 180 TABLET | Refills: 0 | Status: SHIPPED | OUTPATIENT
Start: 2025-07-14

## 2025-07-14 RX ORDER — ACYCLOVIR 400 MG/1
1 TABLET ORAL
Qty: 9 EACH | Refills: 0 | Status: SHIPPED | OUTPATIENT
Start: 2025-07-14

## 2025-07-14 RX ORDER — INSULIN ASPART 100 [IU]/ML
100 INJECTION, SOLUTION INTRAVENOUS; SUBCUTANEOUS DAILY
Qty: 100 ML | Refills: 0 | Status: SHIPPED | OUTPATIENT
Start: 2025-07-14

## 2025-07-14 RX ORDER — LEVOTHYROXINE SODIUM 175 MCG
TABLET ORAL
Qty: 100 TABLET | Refills: 1 | Status: SHIPPED | OUTPATIENT
Start: 2025-07-14

## 2025-07-14 RX ORDER — ROSUVASTATIN CALCIUM 40 MG/1
40 TABLET, COATED ORAL DAILY
Qty: 90 TABLET | Refills: 1 | Status: SHIPPED | OUTPATIENT
Start: 2025-07-14

## 2025-07-14 RX ORDER — ICOSAPENT ETHYL 1 G/1
4 CAPSULE ORAL DAILY
Qty: 360 CAPSULE | Refills: 0 | Status: SHIPPED | OUTPATIENT
Start: 2025-07-14

## 2025-07-14 NOTE — PROGRESS NOTES
"Chief Complaint  Diabetes    Subjective        Cora Gutiérrez presents to [facility] ENDOCRINOLOGY  History of Present Illness    LOV 11/2024  Cancelled 5/2025 & 6/13/25  f/u    Got off track with synthroid and bolusing before meals with recent travel  Depression is managed by PCP with lexapro     Type 2 dm diagnosed April 2015, insulin started at that time   Dm regimen: omnipod 5 with novolog, xigduo 5-1000 BID, mounjaro 12.5mg weekly  Backup plan: yes  Glucagon: yes  Eyes: only vision, no recent diabetic screening   CV: rosuvastatin 40mg QD  and vascepa   Hypothyroid: has been off ~ 1-2 months Synthroid 175mcg- didn't realize she wasn't taking it until recently         Cgm review 7/1/25-7/14/25  63% time in range  1% low  28% high  8% very high  Gmi 7.4%  Average glucose 173  83% automated mode  84% basal  TDD ~30u    Objective   Vital Signs:  /80   Pulse 87   Temp 98.1 °F (36.7 °C) (Oral)   Ht 157.5 cm (62.01\")   Wt 98.6 kg (217 lb 6.4 oz)   SpO2 98%   BMI 39.75 kg/m²   Estimated body mass index is 39.75 kg/m² as calculated from the following:    Height as of this encounter: 157.5 cm (62.01\").    Weight as of this encounter: 98.6 kg (217 lb 6.4 oz).          Physical Exam  Vitals reviewed.   Constitutional:       General: She is not in acute distress.  HENT:      Head: Normocephalic and atraumatic.   Cardiovascular:      Rate and Rhythm: Normal rate.   Pulmonary:      Effort: Pulmonary effort is normal. No respiratory distress.   Musculoskeletal:         General: No signs of injury. Normal range of motion.      Cervical back: Normal range of motion and neck supple.   Skin:     General: Skin is warm and dry.   Neurological:      Mental Status: She is alert and oriented to person, place, and time. Mental status is at baseline.   Psychiatric:         Mood and Affect: Mood normal.         Behavior: Behavior normal.         Thought Content: Thought content normal.         Judgment: " Judgment normal.        Result Review :  The following data was reviewed by: PORSHA Shaw on 07/14/2025:  Common labs          11/4/2024    14:02   Common Labs   Glucose 81    BUN 16    Creatinine 0.83    Sodium 140    Potassium 4.1    Chloride 105    Calcium 9.3    Albumin 4.6    Total Bilirubin 0.3    Alkaline Phosphatase 51    AST (SGOT) 18    ALT (SGPT) 14    Total Cholesterol 187    Triglycerides 278    HDL Cholesterol 46    LDL Cholesterol  94    Hemoglobin A1C 7.6    Microalbumin, Urine 20.4                Assessment and Plan   Diagnoses and all orders for this visit:    1. Type 2 diabetes mellitus with hyperglycemia, with long-term current use of insulin (Primary)  -     Continuous Glucose Sensor (Dexcom G7 Sensor) misc; Use 1 Sensor Every 10 (Ten) Days.  Dispense: 9 each; Refill: 0  -     dapagliflozin-metformin HCl ER (Xigduo XR) 5-1000 MG tablet; Take 1 tablet by mouth 2 (Two) Times a Day.  Dispense: 180 tablet; Refill: 0  -     Tirzepatide (Mounjaro) 12.5 MG/0.5ML solution auto-injector; Inject 0.5 mL under the skin into the appropriate area as directed Every 7 (Seven) Days.  Dispense: 6 mL; Refill: 0  -     Insulin Aspart (NovoLOG) 100 UNIT/ML injection; Use 100 units Daily via Omnipod as directed.  Dispense: 100 mL; Refill: 0  -     Lipid Panel  -     Hemoglobin A1c  -     Comprehensive Metabolic Panel  -     Microalbumin / Creatinine Urine Ratio - Urine, Clean Catch  -     TSH    2. Acquired hypothyroidism  -     Synthroid 175 MCG tablet; 1 tablet mon- sat and 2 tablets on every Sunday  Dispense: 100 tablet; Refill: 1    3. Hyperlipemia, mixed  -     rosuvastatin (CRESTOR) 40 MG tablet; Take 1 tablet by mouth Daily.  Dispense: 90 tablet; Refill: 1  -     icosapent ethyl (Vascepa) 1 g capsule capsule; Take 4 Capsules by mouth Daily.  Dispense: 360 capsule; Refill: 0    4. Class 2 severe obesity due to excess calories with serious comorbidity and body mass index (BMI) of 39.0 to 39.9 in  adult             Follow Up   Return in about 3 months (around 10/14/2025).    Cgm reviewed, not at goal  Labs today for baseline and to assess changes she makes moving forward and the progress it makes with her diabetic and thyroid control   Refilled synthroid to resume   For now, continue plan as above and resume meal time insulin use with omnipod   Additional recommendations to follow as needed    Patient was given instructions and counseling regarding her condition or for health maintenance advice. Please see specific information pulled into the AVS if appropriate.     Thais Wheatley, APRN

## 2025-07-14 NOTE — TELEPHONE ENCOUNTER
Specialty Pharmacy Patient Management Program  Prescription Refill Request     Patient currently fills medications at  Pharmacy. Needing refill(s) on the following:      Requested Prescriptions     Pending Prescriptions Disp Refills    Insulin Disposable Pump (Omnipod 5 [id number] Pods Gen 5) misc 15 each 1     Sig: Use 1 pod Every Other Day.       Last visit: 7/14/25  Next visit: 10/10/25    Pended for PORSHA Shaw to review, and approve if appropriate.        Radha Rowland, PharmD  Clinical Specialty Pharmacist, Endocrinology  7/14/2025  15:59 EDT

## 2025-07-14 NOTE — PROGRESS NOTES
" Specialty Pharmacy       Cora Gutiérrez is a 45 y.o. female seen by an Endocrinology provider for Type 2 Diabetes and Thyroid Disorder.    Benefits Investigation Summary    Prescription: synthroid    Dispensing pharmacy: [facility]      PLAN: [facility]  BIN: [id number]   PCN: [id number]  RX GROUP: [id number]    Prior Auth and Med Assistance notes: Initiated prior authorization for name brand synthroid. If denied, patient will need to continue getting through synthroid delivers program ([facility]). CMM: Key: [id number]  Awaiting response.    Addendum: PA approved. Copay card downloaded for synthroid and added to patient chart.        Specialty Pharmacy Patient Management Program  Endocrinology Refill Outreach      Cora \"Cora Gutiérrez\" is a 45 y.o. female contacted today regarding refills of her medication(s).    Specialty medication(s) and dose(s) confirmed: synthroid    Refill Questions      Flowsheet Row Most Recent Value   Changes to allergies? No   Changes to medications? No   New conditions or infections since last clinic visit No   Unplanned office visit, urgent care, ED, or hospital admission in the last 4 weeks  No   How does patient/caregiver feel medication is working? Good   Financial problems or insurance changes  No   Since the previous refill, were any specialty medication doses or scheduled injections missed or delayed?  No   Does this patient require a clinical escalation to a pharmacist? No          Delivery Questions      Flowsheet Row Most Recent Value   Delivery method UPS   Delivery address verified with patient/caregiver? Yes   Delivery address Home   Number of medications in delivery 2   Medication(s) being filled and delivered Continuous Glucose Sensor (Dexcom G7 Sensor), Levothyroxine Sodium (Synthroid)   Copay verified? Yes   Copay amount 75   Copay form of payment Credit/debit on file   Delivery Date Selection 07/16/25   Signature Required No            Follow-Up: 30 days    Radha Rowland" PharmD  Clinical Specialty Pharmacist, Endocrinology  7/14/2025  16:22 EDT

## 2025-07-14 NOTE — PROGRESS NOTES
Specialty Pharmacy Patient Management Program  Endocrinology Reassessment     Cora Gutiérrez was referred by an Endocrinology provider to the Endocrinology Patient Management program offered by [facility] for Type 2 Diabetes, Hyperlipidemia, and Thyroid Disorder. A follow-up outreach was conducted, including assessment of continued therapy appropriateness, medication adherence, and side effect incidence and management for omnipod, sensors, Mounjaro, Vascepa, and Xigduo.    Changes to Insurance Coverage or Financial Support  No changes    Relevant Past Medical History and Comorbidities  Relevant medical history and concomitant health conditions were discussed with the patient. The patient's chart has been reviewed for relevant past medical history and comorbid health conditions and updated as necessary.   Past Medical History:   Diagnosis Date    BMI 40.0-44.9, adult     Diabetes mellitus     Fatty liver     H/O Pancreatitis, acute     Hyperlipidemia     Hypothyroidism     Left ovarian cyst     Type 2 diabetes mellitus      Social History     Socioeconomic History    Marital status:     Number of children: 0   Tobacco Use    Smoking status: Former     Current packs/day: 0.50     Average packs/day: 0.5 packs/day for 3.0 years (1.5 ttl pk-yrs)     Types: Cigarettes     Passive exposure: Never    Smokeless tobacco: Never    Tobacco comments:     quit cigarrettes 4/01/19, now ecig    Vaping Use    Vaping status: Every Day    Substances: Nicotine, Flavoring    Devices: Disposable   Substance and Sexual Activity    Alcohol use: Yes     Comment: Not enough to even register that i drink    Drug use: No    Sexual activity: Yes     Partners: Male     Birth control/protection: OCP     Problem list reviewed by Radha Rowland, PharmD on 7/14/2025 at  4:25 PM    Hospitalizations and Urgent Care Since Last Assessment  ED Visits, Admissions, or Hospitalizations: none  Urgent Office Visits:  none    Allergies  Known allergies and reactions were discussed with the patient. The patient's chart has been reviewed for allergy information and updated as necessary.   Allergies   Allergen Reactions    Trulicity [Dulaglutide] GI Intolerance     Also h/o pancreatitis; avoid GLP-1 analogs     Allergies reviewed by Radha Rowland, Kanika on 7/14/2025 at  4:24 PM    Relevant Laboratory Values  Relevant laboratory values were discussed with the patient. The following specialty medication dose adjustment(s) are recommended: n/a  A1C Last 3 Results          11/4/2024    14:02   HGBA1C Last 3 Results   Hemoglobin A1C 7.6      Lab Results   Component Value Date    HGBA1C 7.6 (H) 11/04/2024     Lab Results   Component Value Date    GLUCOSE 81 11/04/2024    CALCIUM 9.3 11/04/2024     11/04/2024    K 4.1 11/04/2024    CO2 21 11/04/2024     11/04/2024    BUN 16 11/04/2024    CREATININE 0.83 11/04/2024    EGFRIFAFRI 99 01/19/2022    EGFRIFNONA 86 01/19/2022    BCR 19 11/04/2024    ANIONGAP 11 11/07/2021     Lab Results   Component Value Date    CHLPL 187 11/04/2024    TRIG 278 (H) 11/04/2024    HDL 46 11/04/2024    LDL 94 11/04/2024     Microalbumin          11/4/2024    14:02   Microalbumin   Microalbumin, Urine 20.4      Current Medication List  This medication list has been reviewed with the patient and evaluated for any interactions or necessary modifications/recommendations, and updated to include all prescription medications, OTC medications, and supplements the patient is currently taking.  This list reflects what is contained in the patient's profile, which has also been marked as reviewed to communicate to other providers it is the most up to date version of the patient's current medication therapy.     Current Outpatient Medications:     Insulin Disposable Pump (Omnipod 5 [id number] Pods Gen 5) misc, Use 1 pod Every Other Day., Disp: 15 each, Rfl: 1    Blood Glucose Monitoring Suppl (Accu-Chek Kristina Plus)  w/Device kit, Use to check blood sugar 3 times a day., Disp: 1 kit, Rfl: 0    Continuous Glucose Sensor (Dexcom G7 Sensor) misc, Use 1 Sensor Every 10 (Ten) Days., Disp: 9 each, Rfl: 0    dapagliflozin-metformin HCl ER (Xigduo XR) 5-1000 MG tablet, Take 1 tablet by mouth 2 (Two) Times a Day., Disp: 180 tablet, Rfl: 0    escitalopram (LEXAPRO) 10 MG tablet, Take 1 tablet by mouth Daily., Disp: 90 tablet, Rfl: 1    fenofibrate (TRICOR) 145 MG tablet, Take 1 tablet by mouth Daily., Disp: 30 tablet, Rfl: 0    Glucagon (Baqsimi Two Pack) 3 MG/DOSE powder, Spray 3 mg into the nostril(s) as directed by provider As Needed for hypoglycemia on pump, Disp: 2 each, Rfl: 1    glucose blood (Accu-Chek SmartView) test strip, Check 4 times daily Use as instructed, Disp: 100 each, Rfl: 12    glucose blood test strip, Check Blood Sugar 3 (Three) Times a Day., Disp: 300 each, Rfl: 4    icosapent ethyl (Vascepa) 1 g capsule capsule, Take 4 Capsules by mouth Daily., Disp: 360 capsule, Rfl: 0    insulin aspart (NovoLOG FlexPen) 100 UNIT/ML solution pen-injector sc pen, Inject 120 Units under the skin into the appropriate area as directed Daily. Up to 120u daily with pump, Disp: 45 mL, Rfl: 0    Insulin Aspart (NovoLOG) 100 UNIT/ML injection, Use 100 units Daily via Omnipod as directed., Disp: 100 mL, Rfl: 0    Insulin Pen Needle (BD Pen Needle Gina U/F) 32G X 4 MM misc, USE FIVE TIMES DAILY WITH INSULIN, Disp: 500 each, Rfl: 0    Synthroid 175 MCG tablet, Take 1 tablet by mouth Daily Monday through Saturday, and 2 tablets on Sundays., Disp: 100 tablet, Rfl: 1    lidocaine (LIDODERM) 5 %, Place 1 patch on the skin as directed by provider Daily As Needed (back pain)., Disp: , Rfl:     norgestimate-ethinyl estradiol (Tri-Sprintec) 0.18/0.215/0.25 MG-35 MCG per tablet, Take 1 tablet by mouth Daily., Disp: 28 tablet, Rfl: 11    rosuvastatin (CRESTOR) 40 MG tablet, Take 1 tablet by mouth Daily., Disp: 90 tablet, Rfl: 1    Synthroid 175 MCG  tablet, 1 tablet mon- sat and 2 tablets on every Sunday, Disp: 100 tablet, Rfl: 1    Tirzepatide (Mounjaro) 12.5 MG/0.5ML solution auto-injector, Inject 0.5 mL under the skin into the appropriate area as directed Every 7 (Seven) Days., Disp: 6 mL, Rfl: 0    Medicines reviewed by Radha Rowland, PharmD on 7/14/2025 at  4:25 PM    Drug Interactions  none    Recommended Medications Assessment  Aspirin: Not Taking Currently  Statin: Currently Taking   ACEi/ARB: Not Taking Currently    Adverse Drug Reactions  Medication tolerability: Tolerating with no to minimal ADRs  Medication plan: Continue therapy with normal follow-up  Plan for ADR Management: n/a    Adherence, Self-Administration, and Current Therapy Problems  Adherence related to the patient's specialty therapy was discussed with the patient. The Adherence segment of this outreach has been reviewed and updated.     Adherence Questions  Linked Medication(s) Assessed: Continuous Glucose Sensor (Dexcom G7 Sensor), Insulin Disposable Pump (Omnipod 5 [id number] Pods Gen 5), Tirzepatide (Mounjaro), Dapagliflozin Prop-metFORMIN (XIGDUO XR), Icosapent Ethyl (VASCEPA)  On average, how many doses/injections does the patient miss per month?: 3 (pt reports that she missed a couple of doses while out of town, but has since resumed.)  What are the identified reasons for non-adherence or missed doses? : no problems identified, patient forgets  What is the estimated medication adherence level?: 80-89%  Based on the patient/caregiver response and refill history, does this patient require an MTP to track adherence improvements?: no    Additional Barriers to Patient Self-Administration: none  Methods for Supporting Patient Self-Administration: n/a    Open Medication Therapy Problems  No medication therapy recommendations to display    Goals of Therapy  Goals related to the patient's specialty therapy were discussed with the patient. The Patient Goals segment of this outreach has been  "reviewed and updated.   Goals Addressed Today        Specialty Pharmacy General Goal      TGY < 150 mg/dL    Lab Results   Component Value Date    TRIG 278 (H) 11/04/2024    TRIG 1,751 (H) 04/04/2024    TRIG 369 (H) 08/17/2023    TRIG 265 (H) 11/30/2022    TRIG 340 (H) 04/25/2022           **Improved from > 2000 mg/dL in Nov. 2021 during acute pancreatitis admission  2/20/25 - Reassessment - TG improved but not yet to goal - pt tolerating Vascepa, fenofibrate and rosuvastatin well without adverse effects and reports adherence - at max dose of all three meds - no changes recommended at this time   7/14/25: Labs had improved significantly when last checked. Pending labs following visit today. Changes based on results as needed.        Specialty Pharmacy General Goal      A1c < 7%    Lab Results   Component Value Date    HGBA1C 7.6 (H) 11/04/2024    HGBA1C 7.60 (H) 04/04/2024    HGBA1C 8.40 (H) 11/01/2023    HGBA1C 8.30 (H) 08/17/2023    HGBA1C 8.30 (H) 04/04/2023 7/14/25: on track, pending labs after office visit today- changes pending results.              Quality of Life Assessment   Quality of Life related to the patient's enrollment in the patient management program and services provided was discussed with the patient. The QOL segment of this outreach has been reviewed and updated.  Quality of Life Improvement Scale: 8-Moderately better    Reassessment Plan & Follow-Up  1. Medication Therapy Changes: pending labs. Changes will be made as needed based on lab results.  2. Related Plans, Therapy Recommendations, or Issues to Be Addressed: no issues  3. Pharmacist to perform regular assessments no more than (6) months from the previous assessment.  4. Care Coordinator to set up future refill outreaches, coordinate prescription delivery, and escalate clinical questions to pharmacist.     Specialty Pharmacy Patient Management Program  Endocrinology Refill Outreach      Cora \"Cora Gutiérrez\" is a 45 y.o. female " contacted today regarding refills of her medication(s).    Specialty medication(s) and dose(s) confirmed: sensors and synthroid    Refill Questions      Flowsheet Row Most Recent Value   Changes to allergies? No   New conditions or infections since last clinic visit No   Unplanned office visit, urgent care, ED, or hospital admission in the last 4 weeks  No   How does patient/caregiver feel medication is working? Good   Financial problems or insurance changes  No   Since the previous refill, were any specialty medication doses or scheduled injections missed or delayed?  No   Does this patient require a clinical escalation to a pharmacist? No          Delivery Questions      Flowsheet Row Most Recent Value   Delivery method UPS   Delivery address verified with patient/caregiver? Yes   Delivery address Home   Number of medications in delivery 1   Medication(s) being filled and delivered Continuous Glucose Sensor (Dexcom G7 Sensor)   Copay verified? Yes   Copay amount 0   Copay form of payment No copayment ($0)   Delivery Date Selection 07/15/25   Signature Required No            Follow-Up: 90 days    Attestation  Therapeutic appropriateness: Appropriate   I attest the patient was actively involved in and has agreed to the above plan of care.  If the prescribed therapy is at any point deemed not appropriate based on the current or future assessments, a consultation will be initiated with the patient's specialty care provider to determine the best course of action. The revised plan of therapy will be documented along with any required assessments and/or additional patient education provided.     Radha Rowland, PharmD  Clinical Specialty Pharmacist, Endocrinology  7/14/2025  16:28 EDT    Discussed the aforementioned information with the patient via In-Person.

## 2025-07-14 NOTE — PROGRESS NOTES
Specialty Pharmacy Patient Management Program  Refill Outreach     Appears to be in order     Thais Lees, Pharmacy Technician  7/14/2025  15:37 EDT

## 2025-07-15 LAB
ALBUMIN SERPL-MCNC: 4.7 G/DL (ref 3.5–5.2)
ALBUMIN/CREAT UR: 36 MG/G CREAT (ref 0–29)
ALBUMIN/GLOB SERPL: 1.6 G/DL
ALP SERPL-CCNC: 50 U/L (ref 39–117)
ALT SERPL-CCNC: 34 U/L (ref 1–33)
AST SERPL-CCNC: 24 U/L (ref 1–32)
BILIRUB SERPL-MCNC: 0.4 MG/DL (ref 0–1.2)
BUN SERPL-MCNC: 13 MG/DL (ref 6–20)
BUN/CREAT SERPL: 10.9 (ref 7–25)
CALCIUM SERPL-MCNC: 9.6 MG/DL (ref 8.6–10.5)
CHLORIDE SERPL-SCNC: 102 MMOL/L (ref 98–107)
CHOLEST SERPL-MCNC: 267 MG/DL (ref 0–200)
CO2 SERPL-SCNC: 21.5 MMOL/L (ref 22–29)
CREAT SERPL-MCNC: 1.19 MG/DL (ref 0.57–1)
CREAT UR-MCNC: 108.2 MG/DL
EGFRCR SERPLBLD CKD-EPI 2021: 57.9 ML/MIN/1.73
GLOBULIN SER CALC-MCNC: 3 GM/DL
GLUCOSE SERPL-MCNC: 95 MG/DL (ref 65–99)
HBA1C MFR BLD: 7.1 % (ref 4.8–5.6)
HDLC SERPL-MCNC: 60 MG/DL (ref 40–60)
IMP & REVIEW OF LAB RESULTS: NORMAL
LDLC SERPL CALC-MCNC: 168 MG/DL (ref 0–100)
MICROALBUMIN UR-MCNC: 38.9 UG/ML
POTASSIUM SERPL-SCNC: 5 MMOL/L (ref 3.5–5.2)
PROT SERPL-MCNC: 7.7 G/DL (ref 6–8.5)
SODIUM SERPL-SCNC: 137 MMOL/L (ref 136–145)
TRIGL SERPL-MCNC: 214 MG/DL (ref 0–150)
TSH SERPL DL<=0.005 MIU/L-ACNC: ABNORMAL UIU/ML (ref 0.27–4.2)
VLDLC SERPL CALC-MCNC: 39 MG/DL (ref 5–40)

## 2025-07-21 ENCOUNTER — LAB (OUTPATIENT)
Dept: ENDOCRINOLOGY | Age: 45
End: 2025-07-21
Payer: COMMERCIAL

## 2025-07-21 DIAGNOSIS — E11.65 TYPE 2 DIABETES MELLITUS WITH HYPERGLYCEMIA, WITH LONG-TERM CURRENT USE OF INSULIN: ICD-10-CM

## 2025-07-21 DIAGNOSIS — Z79.4 TYPE 2 DIABETES MELLITUS WITH HYPERGLYCEMIA, WITH LONG-TERM CURRENT USE OF INSULIN: ICD-10-CM

## 2025-07-22 LAB
BUN SERPL-MCNC: 12 MG/DL (ref 6–20)
BUN/CREAT SERPL: 9.1 (ref 7–25)
CALCIUM SERPL-MCNC: 9.1 MG/DL (ref 8.6–10.5)
CHLORIDE SERPL-SCNC: 108 MMOL/L (ref 98–107)
CO2 SERPL-SCNC: 21 MMOL/L (ref 22–29)
CREAT SERPL-MCNC: 1.32 MG/DL (ref 0.57–1)
EGFRCR SERPLBLD CKD-EPI 2021: 51.2 ML/MIN/1.73
GLUCOSE SERPL-MCNC: 137 MG/DL (ref 65–99)
POTASSIUM SERPL-SCNC: 4.3 MMOL/L (ref 3.5–5.2)
SODIUM SERPL-SCNC: 142 MMOL/L (ref 136–145)

## 2025-07-24 ENCOUNTER — SPECIALTY PHARMACY (OUTPATIENT)
Dept: ENDOCRINOLOGY | Age: 45
End: 2025-07-24
Payer: COMMERCIAL

## 2025-07-24 NOTE — PROGRESS NOTES
"   Specialty Pharmacy Patient Management Program  Refill Outreach     Cora \"Cora Gutiérrez\" was contacted today regarding refills of their medication(s).    Refill Questions      Flowsheet Row Most Recent Value   Changes to allergies? No   Changes to medications? No   New conditions or infections since last clinic visit No   Unplanned office visit, urgent care, ED, or hospital admission in the last 4 weeks  No   How does patient/caregiver feel medication is working? Good   Financial problems or insurance changes  No   Since the previous refill, were any specialty medication doses or scheduled injections missed or delayed?  No   Does this patient require a clinical escalation to a pharmacist? No            Delivery Questions      Flowsheet Row Most Recent Value   Delivery method UPS   Delivery address verified with patient/caregiver? Yes   Delivery address Home   Number of medications in delivery 2   Medication(s) being filled and delivered Insulin Disposable Pump (Omnipod 5 [id number] Pods Gen 5), Insulin Aspart (novoLOG)   Doses left of specialty medications 1 week   Copay verified? Yes   Copay amount $50.00   Copay form of payment Credit/debit on file   Delivery Date Selection 07/29/25   Signature Required No   Do you consent to receive electronic handouts?  No            Medication Adherence    Adherence tools used: patient uses a pill box to manage medications  Support network for adherence: family member, healthcare provider          Follow-up: 23 day(s)     Thais Lees, Pharmacy Technician  7/24/2025  14:22 EDT    "

## 2025-08-04 ENCOUNTER — SPECIALTY PHARMACY (OUTPATIENT)
Dept: ENDOCRINOLOGY | Age: 45
End: 2025-08-04
Payer: COMMERCIAL

## 2025-08-11 ENCOUNTER — SPECIALTY PHARMACY (OUTPATIENT)
Dept: ENDOCRINOLOGY | Age: 45
End: 2025-08-11
Payer: COMMERCIAL

## 2025-08-20 ENCOUNTER — SPECIALTY PHARMACY (OUTPATIENT)
Dept: ENDOCRINOLOGY | Age: 45
End: 2025-08-20
Payer: COMMERCIAL